# Patient Record
Sex: FEMALE | Race: WHITE | Employment: UNEMPLOYED | URBAN - METROPOLITAN AREA
[De-identification: names, ages, dates, MRNs, and addresses within clinical notes are randomized per-mention and may not be internally consistent; named-entity substitution may affect disease eponyms.]

---

## 2020-09-06 ENCOUNTER — HOSPITAL ENCOUNTER (EMERGENCY)
Age: 55
Discharge: ANOTHER HOSPITAL | End: 2020-09-06
Payer: COMMERCIAL

## 2020-09-06 VITALS
OXYGEN SATURATION: 91 % | RESPIRATION RATE: 20 BRPM | TEMPERATURE: 99.7 F | WEIGHT: 293 LBS | HEART RATE: 114 BPM | SYSTOLIC BLOOD PRESSURE: 202 MMHG | DIASTOLIC BLOOD PRESSURE: 93 MMHG

## 2020-09-06 PROCEDURE — 99212 OFFICE O/P EST SF 10 MIN: CPT

## 2020-09-06 NOTE — ED PROVIDER NOTES
Department of Emergency Medicine   ED  Provider Note  Admit Date/RoomTime: 2020  4:18 PM  ED Room:     Chief Complaint   Pharyngitis (fever, since Thursday) and Otalgia    History of Present Illness   Source of history provided by:  patient. History/Exam Limitations: none. Iglesia David is a 63 y.o. old female who has a past medical history of: History reviewed. No pertinent past medical history. presents to the emergency department with a complaint of sore throat and ear pain. Patient states this is day #5 of pain to the right side of her throat, traveling to her right ear. Pain with swallowing. Fever. Positive headache. Mild, nonproductive cough. Patient denies any nausea, vomiting, diarrhea. Patient denies any rashes. Patient took Motrin yesterday without relief. No medication today. ROS    Pertinent positives and negatives are stated within HPI, all other systems reviewed and are negative. Past Surgical History:  has a past surgical history that includes cyst removal and  section. Social History:  reports that she has never smoked. She does not have any smokeless tobacco history on file. She reports that she does not drink alcohol or use drugs. Family History: family history is not on file. Allergies: Patient has no known allergies. Physical Exam           ED Triage Vitals   BP Temp Temp src Pulse Resp SpO2 Height Weight   20 1624 20 1624 -- 20 1624 20 1624 20 1624 -- 20 1626   (!) 202/93 99.7 °F (37.6 °C)  114 20 91 %  (!) 510 lb (231.3 kg)     Oxygen Saturation Interpretation: Normal.  General:  NAD. Alert and Oriented. Well-appearing. Morbidly obese. Skin:  Warm, dry. No rashes. Head:  Normocephalic. Atraumatic. Eyes:  EOMI. Conjunctiva normal.  ENT:  Oral mucosa moist.  Airway patent. Posterior pharynx with deep erythema and fullness noted to the right tonsillar pillar.   Slight deflection of the uvula to the left.  Patient is handling her secretions fine. Bilateral TMs without erythema, without bulging. Neck:  Supple. Normal ROM. Respiratory:  No respiratory distress. No labored breathing. Lungs clear without rales, rhonchi or wheezing. Cardiovascular:  Regular rate. No Murmur. No peripheral edema. Extremities warm and good color. Extremities:  Normal ROM. Nontender to palpation. Atraumatic. Back:  Normal ROM. Nontender to palpation. Neuro:  Alert and Oriented to person, place, time and situation. Normal LOC. Moves all extremities. Speech fluent. Psych:  Calm and Cooperative. Normal thought process. Normal judgement. Lab / Imaging Results   (All laboratory and radiology results have been personally reviewed by myself)  Labs:  No results found for this visit on 09/06/20. Imaging: All Radiology results interpreted by Radiologist unless otherwise noted. No orders to display       ED Course / Medical Decision Making   Medications - No data to display     Consult(s):   None    Procedure(s):   none    MDM:   Patient is to go to the emergency room for CT soft tissue neck to rule out peritonsillar abscess, lab work and IV antibiotics and steroids. Patient states she will be going to [facility] emergency department where her physician goes. Counseling: The emergency provider has spoken with the patient and discussed todays results, in addition to providing specific details for the plan of care and counseling regarding the diagnosis and prognosis. Questions are answered at this time and they are agreeable with the plan. Assessment     1. Acute pharyngitis, unspecified etiology New Problem   2. Throat swelling New Problem   3.  Hypertension, unspecified type      Plan   Discharge to home  Patient condition is good    New Medications     New Prescriptions    No medications on file     Electronically signed by CARRIE Varma   DD: 9/6/20  **This report was transcribed using voice recognition software. Every effort was made to ensure accuracy; however, inadvertent computerized transcription errors may be present.   END OF ED PROVIDER NOTE        Jan Packer, [address]  09/06/20 [address], [address]  09/06/20 2026

## 2024-10-04 ENCOUNTER — HOSPITAL ENCOUNTER (INPATIENT)
Age: 59
LOS: 12 days | Discharge: NURSING, REHAB OR LONG-TERM CARE | DRG: 698 | End: 2024-10-16
Attending: INTERNAL MEDICINE | Admitting: HOSPITALIST
Payer: COMMERCIAL

## 2024-10-04 DIAGNOSIS — G89.29 OTHER CHRONIC PAIN: ICD-10-CM

## 2024-10-04 DIAGNOSIS — M48.02 CERVICAL STENOSIS OF SPINE: ICD-10-CM

## 2024-10-04 DIAGNOSIS — R41.0 ACUTE DELIRIUM: Primary | ICD-10-CM

## 2024-10-04 PROBLEM — R56.9 SEIZURE-LIKE ACTIVITY (HCC): Status: ACTIVE | Noted: 2024-10-04

## 2024-10-04 LAB
ALBUMIN SERPL-MCNC: 2 G/DL (ref 3.5–5.2)
ALP SERPL-CCNC: 138 U/L (ref 35–104)
ALT SERPL-CCNC: 13 U/L (ref 0–32)
AMMONIA PLAS-SCNC: 37 UMOL/L (ref 11–51)
ANION GAP SERPL CALCULATED.3IONS-SCNC: 8 MMOL/L (ref 7–16)
AST SERPL-CCNC: 30 U/L (ref 0–31)
BASOPHILS # BLD: 0.04 K/UL (ref 0–0.2)
BASOPHILS NFR BLD: 1 % (ref 0–2)
BILIRUB SERPL-MCNC: 0.9 MG/DL (ref 0–1.2)
BUN SERPL-MCNC: 28 MG/DL (ref 6–23)
CALCIUM SERPL-MCNC: 8.8 MG/DL (ref 8.6–10.2)
CHLORIDE SERPL-SCNC: 110 MMOL/L (ref 98–107)
CO2 SERPL-SCNC: 20 MMOL/L (ref 22–29)
CREAT SERPL-MCNC: 0.8 MG/DL (ref 0.5–1)
EOSINOPHIL # BLD: 0.15 K/UL (ref 0.05–0.5)
EOSINOPHILS RELATIVE PERCENT: 2 % (ref 0–6)
ERYTHROCYTE [DISTWIDTH] IN BLOOD BY AUTOMATED COUNT: 16.8 % (ref 11.5–15)
ERYTHROCYTE [SEDIMENTATION RATE] IN BLOOD BY WESTERGREN METHOD: 40 MM/HR (ref 0–20)
GFR, ESTIMATED: 83 ML/MIN/1.73M2
GLUCOSE BLD-MCNC: 193 MG/DL (ref 74–99)
GLUCOSE SERPL-MCNC: 199 MG/DL (ref 74–99)
HCT VFR BLD AUTO: 29.7 % (ref 34–48)
HGB BLD-MCNC: 9.4 G/DL (ref 11.5–15.5)
IMM GRANULOCYTES # BLD AUTO: 0.05 K/UL (ref 0–0.58)
IMM GRANULOCYTES NFR BLD: 1 % (ref 0–5)
LYMPHOCYTES NFR BLD: 0.66 K/UL (ref 1.5–4)
LYMPHOCYTES RELATIVE PERCENT: 8 % (ref 20–42)
MCH RBC QN AUTO: 35.6 PG (ref 26–35)
MCHC RBC AUTO-ENTMCNC: 31.6 G/DL (ref 32–34.5)
MCV RBC AUTO: 112.5 FL (ref 80–99.9)
MONOCYTES NFR BLD: 0.67 K/UL (ref 0.1–0.95)
MONOCYTES NFR BLD: 8 % (ref 2–12)
NEUTROPHILS NFR BLD: 80 % (ref 43–80)
NEUTS SEG NFR BLD: 6.4 K/UL (ref 1.8–7.3)
PLATELET # BLD AUTO: 83 K/UL (ref 130–450)
PLATELET CONFIRMATION: NORMAL
PMV BLD AUTO: 10.5 FL (ref 7–12)
POTASSIUM SERPL-SCNC: 5 MMOL/L (ref 3.5–5)
PROCALCITONIN SERPL-MCNC: 0.21 NG/ML (ref 0–0.08)
PROT SERPL-MCNC: 5.7 G/DL (ref 6.4–8.3)
RBC # BLD AUTO: 2.64 M/UL (ref 3.5–5.5)
RBC # BLD: ABNORMAL 10*6/UL
SODIUM SERPL-SCNC: 138 MMOL/L (ref 132–146)
TSH SERPL DL<=0.05 MIU/L-ACNC: 5.75 UIU/ML (ref 0.27–4.2)
WBC OTHER # BLD: 8 K/UL (ref 4.5–11.5)

## 2024-10-04 PROCEDURE — 99223 1ST HOSP IP/OBS HIGH 75: CPT

## 2024-10-04 PROCEDURE — 87040 BLOOD CULTURE FOR BACTERIA: CPT

## 2024-10-04 PROCEDURE — 85652 RBC SED RATE AUTOMATED: CPT

## 2024-10-04 PROCEDURE — 82962 GLUCOSE BLOOD TEST: CPT

## 2024-10-04 PROCEDURE — 82140 ASSAY OF AMMONIA: CPT

## 2024-10-04 PROCEDURE — 85025 COMPLETE CBC W/AUTO DIFF WBC: CPT

## 2024-10-04 PROCEDURE — 2580000003 HC RX 258

## 2024-10-04 PROCEDURE — 6370000000 HC RX 637 (ALT 250 FOR IP)

## 2024-10-04 PROCEDURE — 6360000002 HC RX W HCPCS

## 2024-10-04 PROCEDURE — 84145 PROCALCITONIN (PCT): CPT

## 2024-10-04 PROCEDURE — 2140000000 HC CCU INTERMEDIATE R&B

## 2024-10-04 PROCEDURE — 36415 COLL VENOUS BLD VENIPUNCTURE: CPT

## 2024-10-04 PROCEDURE — 80053 COMPREHEN METABOLIC PANEL: CPT

## 2024-10-04 PROCEDURE — 51702 INSERT TEMP BLADDER CATH: CPT

## 2024-10-04 PROCEDURE — 84443 ASSAY THYROID STIM HORMONE: CPT

## 2024-10-04 RX ORDER — POLYETHYLENE GLYCOL 3350 17 G/17G
17 POWDER, FOR SOLUTION ORAL DAILY PRN
Status: DISCONTINUED | OUTPATIENT
Start: 2024-10-04 | End: 2024-10-17 | Stop reason: HOSPADM

## 2024-10-04 RX ORDER — ONDANSETRON 2 MG/ML
4 INJECTION INTRAMUSCULAR; INTRAVENOUS EVERY 6 HOURS PRN
Status: DISCONTINUED | OUTPATIENT
Start: 2024-10-04 | End: 2024-10-17 | Stop reason: HOSPADM

## 2024-10-04 RX ORDER — LANOLIN ALCOHOL/MO/W.PET/CERES
3 CREAM (GRAM) TOPICAL NIGHTLY
Status: DISCONTINUED | OUTPATIENT
Start: 2024-10-04 | End: 2024-10-13

## 2024-10-04 RX ORDER — POTASSIUM CHLORIDE 7.45 MG/ML
10 INJECTION INTRAVENOUS PRN
Status: DISCONTINUED | OUTPATIENT
Start: 2024-10-04 | End: 2024-10-17 | Stop reason: HOSPADM

## 2024-10-04 RX ORDER — SODIUM CHLORIDE 0.9 % (FLUSH) 0.9 %
5-40 SYRINGE (ML) INJECTION PRN
Status: DISCONTINUED | OUTPATIENT
Start: 2024-10-04 | End: 2024-10-10 | Stop reason: SDUPTHER

## 2024-10-04 RX ORDER — MAGNESIUM HYDROXIDE/ALUMINUM HYDROXICE/SIMETHICONE 120; 1200; 1200 MG/30ML; MG/30ML; MG/30ML
30 SUSPENSION ORAL EVERY 6 HOURS PRN
Status: DISCONTINUED | OUTPATIENT
Start: 2024-10-04 | End: 2024-10-17 | Stop reason: HOSPADM

## 2024-10-04 RX ORDER — INSULIN LISPRO 100 [IU]/ML
0-8 INJECTION, SOLUTION INTRAVENOUS; SUBCUTANEOUS
Status: DISCONTINUED | OUTPATIENT
Start: 2024-10-05 | End: 2024-10-17 | Stop reason: HOSPADM

## 2024-10-04 RX ORDER — POTASSIUM CHLORIDE 1500 MG/1
40 TABLET, EXTENDED RELEASE ORAL PRN
Status: DISCONTINUED | OUTPATIENT
Start: 2024-10-04 | End: 2024-10-17 | Stop reason: HOSPADM

## 2024-10-04 RX ORDER — ONDANSETRON 4 MG/1
4 TABLET, ORALLY DISINTEGRATING ORAL EVERY 8 HOURS PRN
Status: DISCONTINUED | OUTPATIENT
Start: 2024-10-04 | End: 2024-10-17 | Stop reason: HOSPADM

## 2024-10-04 RX ORDER — SODIUM CHLORIDE 9 MG/ML
INJECTION, SOLUTION INTRAVENOUS PRN
Status: DISCONTINUED | OUTPATIENT
Start: 2024-10-04 | End: 2024-10-10 | Stop reason: SDUPTHER

## 2024-10-04 RX ORDER — ACETAMINOPHEN 325 MG/1
650 TABLET ORAL EVERY 6 HOURS PRN
Status: DISCONTINUED | OUTPATIENT
Start: 2024-10-04 | End: 2024-10-17 | Stop reason: HOSPADM

## 2024-10-04 RX ORDER — LEVETIRACETAM 500 MG/5ML
500 INJECTION, SOLUTION, CONCENTRATE INTRAVENOUS EVERY 12 HOURS
Status: DISCONTINUED | OUTPATIENT
Start: 2024-10-04 | End: 2024-10-17 | Stop reason: HOSPADM

## 2024-10-04 RX ORDER — LORAZEPAM 2 MG/ML
4 INJECTION INTRAMUSCULAR EVERY 5 MIN PRN
Status: DISCONTINUED | OUTPATIENT
Start: 2024-10-04 | End: 2024-10-17 | Stop reason: HOSPADM

## 2024-10-04 RX ORDER — MAGNESIUM SULFATE IN WATER 40 MG/ML
2000 INJECTION, SOLUTION INTRAVENOUS PRN
Status: DISCONTINUED | OUTPATIENT
Start: 2024-10-04 | End: 2024-10-17 | Stop reason: HOSPADM

## 2024-10-04 RX ORDER — HEPARIN SODIUM 5000 [USP'U]/ML
5000 INJECTION, SOLUTION INTRAVENOUS; SUBCUTANEOUS EVERY 8 HOURS SCHEDULED
Status: DISCONTINUED | OUTPATIENT
Start: 2024-10-04 | End: 2024-10-17 | Stop reason: HOSPADM

## 2024-10-04 RX ORDER — GLUCAGON 1 MG/ML
1 KIT INJECTION PRN
Status: DISCONTINUED | OUTPATIENT
Start: 2024-10-04 | End: 2024-10-17 | Stop reason: HOSPADM

## 2024-10-04 RX ORDER — SODIUM CHLORIDE 0.9 % (FLUSH) 0.9 %
5-40 SYRINGE (ML) INJECTION EVERY 12 HOURS SCHEDULED
Status: DISCONTINUED | OUTPATIENT
Start: 2024-10-04 | End: 2024-10-10 | Stop reason: SDUPTHER

## 2024-10-04 RX ORDER — ENOXAPARIN SODIUM 100 MG/ML
40 INJECTION SUBCUTANEOUS DAILY
Status: DISCONTINUED | OUTPATIENT
Start: 2024-10-04 | End: 2024-10-04

## 2024-10-04 RX ORDER — ACETAMINOPHEN 650 MG/1
650 SUPPOSITORY RECTAL EVERY 6 HOURS PRN
Status: DISCONTINUED | OUTPATIENT
Start: 2024-10-04 | End: 2024-10-17 | Stop reason: HOSPADM

## 2024-10-04 RX ORDER — INSULIN LISPRO 100 [IU]/ML
0-4 INJECTION, SOLUTION INTRAVENOUS; SUBCUTANEOUS NIGHTLY
Status: DISCONTINUED | OUTPATIENT
Start: 2024-10-04 | End: 2024-10-17 | Stop reason: HOSPADM

## 2024-10-04 RX ORDER — DEXTROSE MONOHYDRATE 100 MG/ML
INJECTION, SOLUTION INTRAVENOUS CONTINUOUS PRN
Status: DISCONTINUED | OUTPATIENT
Start: 2024-10-04 | End: 2024-10-17 | Stop reason: HOSPADM

## 2024-10-04 RX ADMIN — LEVETIRACETAM 500 MG: 100 INJECTION INTRAVENOUS at 22:24

## 2024-10-04 RX ADMIN — HEPARIN SODIUM 5000 UNITS: 5000 INJECTION INTRAVENOUS; SUBCUTANEOUS at 22:25

## 2024-10-04 RX ADMIN — ACETAMINOPHEN 650 MG: 325 TABLET ORAL at 22:25

## 2024-10-04 RX ADMIN — SODIUM CHLORIDE, PRESERVATIVE FREE 10 ML: 5 INJECTION INTRAVENOUS at 22:24

## 2024-10-04 RX ADMIN — Medication 3 MG: at 22:26

## 2024-10-04 ASSESSMENT — PAIN DESCRIPTION - FREQUENCY: FREQUENCY: CONTINUOUS

## 2024-10-04 ASSESSMENT — PAIN DESCRIPTION - DESCRIPTORS: DESCRIPTORS: ACHING;DISCOMFORT;SORE;TENDER

## 2024-10-04 ASSESSMENT — PAIN SCALES - WONG BAKER
WONGBAKER_NUMERICALRESPONSE: HURTS LITTLE MORE
WONGBAKER_NUMERICALRESPONSE: HURTS A LITTLE BIT

## 2024-10-04 ASSESSMENT — PAIN SCALES - GENERAL
PAINLEVEL_OUTOF10: 7
PAINLEVEL_OUTOF10: 4

## 2024-10-04 ASSESSMENT — PAIN DESCRIPTION - LOCATION: LOCATION: GENERALIZED

## 2024-10-04 ASSESSMENT — PAIN DESCRIPTION - ONSET: ONSET: ON-GOING

## 2024-10-04 ASSESSMENT — PAIN DESCRIPTION - PAIN TYPE: TYPE: CHRONIC PAIN

## 2024-10-04 ASSESSMENT — PAIN - FUNCTIONAL ASSESSMENT: PAIN_FUNCTIONAL_ASSESSMENT: ACTIVITIES ARE NOT PREVENTED

## 2024-10-05 PROBLEM — Z51.5 PALLIATIVE CARE BY SPECIALIST: Status: ACTIVE | Noted: 2024-10-05

## 2024-10-05 PROBLEM — Z71.89 GOALS OF CARE, COUNSELING/DISCUSSION: Status: ACTIVE | Noted: 2024-10-05

## 2024-10-05 LAB
ALBUMIN SERPL-MCNC: 1.6 G/DL (ref 3.5–5.2)
ALP SERPL-CCNC: 117 U/L (ref 35–104)
ALT SERPL-CCNC: 10 U/L (ref 0–32)
ANION GAP SERPL CALCULATED.3IONS-SCNC: 5 MMOL/L (ref 7–16)
ANION GAP SERPL CALCULATED.3IONS-SCNC: 6 MMOL/L (ref 7–16)
AST SERPL-CCNC: 38 U/L (ref 0–31)
B-OH-BUTYR SERPL-MCNC: 0.28 MMOL/L (ref 0.02–0.27)
B.E.: -2.2 MMOL/L (ref -3–3)
BACTERIA URNS QL MICRO: ABNORMAL
BASOPHILS # BLD: 0 K/UL (ref 0–0.2)
BASOPHILS NFR BLD: 0 % (ref 0–2)
BILIRUB SERPL-MCNC: 0.9 MG/DL (ref 0–1.2)
BILIRUB UR QL STRIP: NEGATIVE
BUN SERPL-MCNC: 29 MG/DL (ref 6–23)
BUN SERPL-MCNC: 29 MG/DL (ref 6–23)
CALCIUM SERPL-MCNC: 8.8 MG/DL (ref 8.6–10.2)
CALCIUM SERPL-MCNC: 8.9 MG/DL (ref 8.6–10.2)
CASTS #/AREA URNS LPF: ABNORMAL /LPF
CHLORIDE SERPL-SCNC: 112 MMOL/L (ref 98–107)
CHLORIDE SERPL-SCNC: 113 MMOL/L (ref 98–107)
CLARITY UR: ABNORMAL
CO2 SERPL-SCNC: 16 MMOL/L (ref 22–29)
CO2 SERPL-SCNC: 22 MMOL/L (ref 22–29)
COHB: 1 % (ref 0–1.5)
COLOR UR: YELLOW
CREAT SERPL-MCNC: 0.8 MG/DL (ref 0.5–1)
CREAT SERPL-MCNC: 0.8 MG/DL (ref 0.5–1)
CREAT UR-MCNC: 100.9 MG/DL (ref 29–226)
CRITICAL: ABNORMAL
CRP SERPL HS-MCNC: 50 MG/L (ref 0–5)
CRYSTALS URNS MICRO: ABNORMAL /HPF
DATE ANALYZED: ABNORMAL
DATE OF COLLECTION: ABNORMAL
EOSINOPHIL # BLD: 0.18 K/UL (ref 0.05–0.5)
EOSINOPHILS RELATIVE PERCENT: 3 % (ref 0–6)
ERYTHROCYTE [DISTWIDTH] IN BLOOD BY AUTOMATED COUNT: 17.1 % (ref 11.5–15)
FERRITIN SERPL-MCNC: 912 NG/ML
FOLATE SERPL-MCNC: 17 NG/ML (ref 4.8–24.2)
GFR, ESTIMATED: 83 ML/MIN/1.73M2
GFR, ESTIMATED: 86 ML/MIN/1.73M2
GLUCOSE BLD-MCNC: 158 MG/DL (ref 74–99)
GLUCOSE BLD-MCNC: 159 MG/DL (ref 74–99)
GLUCOSE BLD-MCNC: 170 MG/DL (ref 74–99)
GLUCOSE BLD-MCNC: 171 MG/DL (ref 74–99)
GLUCOSE SERPL-MCNC: 160 MG/DL (ref 74–99)
GLUCOSE SERPL-MCNC: 168 MG/DL (ref 74–99)
GLUCOSE UR STRIP-MCNC: NEGATIVE MG/DL
HCO3: 21.5 MMOL/L (ref 22–26)
HCT VFR BLD AUTO: 27.8 % (ref 34–48)
HGB BLD-MCNC: 8.4 G/DL (ref 11.5–15.5)
HGB UR QL STRIP.AUTO: NEGATIVE
HHB: 0.8 % (ref 0–5)
INR PPP: 1.5
IRON SATN MFR SERPL: NORMAL % (ref 15–50)
IRON SERPL-MCNC: 106 UG/DL (ref 37–145)
KETONES UR STRIP-MCNC: ABNORMAL MG/DL
LAB: ABNORMAL
LACTATE BLDV-SCNC: 1.7 MMOL/L (ref 0.5–2.2)
LEUKOCYTE ESTERASE UR QL STRIP: ABNORMAL
LYMPHOCYTES NFR BLD: 0.95 K/UL (ref 1.5–4)
LYMPHOCYTES RELATIVE PERCENT: 14 % (ref 20–42)
Lab: 1245
MCH RBC QN AUTO: 35.6 PG (ref 26–35)
MCHC RBC AUTO-ENTMCNC: 30.2 G/DL (ref 32–34.5)
MCV RBC AUTO: 117.8 FL (ref 80–99.9)
METHB: 0.4 % (ref 0–1.5)
MODE: ABNORMAL
MONOCYTES NFR BLD: 0 % (ref 2–12)
MONOCYTES NFR BLD: 0 K/UL (ref 0.1–0.95)
NEUTROPHILS NFR BLD: 83 % (ref 43–80)
NEUTS SEG NFR BLD: 5.67 K/UL (ref 1.8–7.3)
NITRITE UR QL STRIP: POSITIVE
O2 SATURATION: 99.2 % (ref 92–98.5)
O2HB: 97.8 % (ref 94–97)
OPERATOR ID: 7490
OSMOLALITY UR: 625 MOSM/KG (ref 300–900)
PATIENT TEMP: 37 C
PCO2: 32.4 MMHG (ref 35–45)
PH BLOOD GAS: 7.44 (ref 7.35–7.45)
PH UR STRIP: 6 [PH] (ref 5–9)
PLATELET # BLD AUTO: 59 K/UL (ref 130–450)
PLATELET CONFIRMATION: NORMAL
PMV BLD AUTO: 10.8 FL (ref 7–12)
PO2: 174.1 MMHG (ref 75–100)
POTASSIUM SERPL-SCNC: 5 MMOL/L (ref 3.5–5)
POTASSIUM SERPL-SCNC: 5.4 MMOL/L (ref 3.5–5)
PROT SERPL-MCNC: 5.5 G/DL (ref 6.4–8.3)
PROT UR STRIP-MCNC: ABNORMAL MG/DL
PROTHROMBIN TIME: 15.9 SEC (ref 9.3–12.4)
RBC # BLD AUTO: 2.36 M/UL (ref 3.5–5.5)
RBC # BLD: ABNORMAL 10*6/UL
RBC #/AREA URNS HPF: ABNORMAL /HPF
SODIUM SERPL-SCNC: 134 MMOL/L (ref 132–146)
SODIUM SERPL-SCNC: 140 MMOL/L (ref 132–146)
SODIUM UR-SCNC: 58 MMOL/L
SOURCE, BLOOD GAS: ABNORMAL
SP GR UR STRIP: 1.02 (ref 1–1.03)
THB: 9.1 G/DL (ref 11.5–16.5)
TIBC SERPL-MCNC: NORMAL UG/DL (ref 250–450)
TIME ANALYZED: 1250
UROBILINOGEN UR STRIP-ACNC: 0.2 EU/DL (ref 0–1)
UUN UR-MCNC: 823 MG/DL (ref 800–1666)
VIT B12 SERPL-MCNC: >2000 PG/ML (ref 211–946)
WBC #/AREA URNS HPF: ABNORMAL /HPF
WBC OTHER # BLD: 6.8 K/UL (ref 4.5–11.5)

## 2024-10-05 PROCEDURE — 80048 BASIC METABOLIC PNL TOTAL CA: CPT

## 2024-10-05 PROCEDURE — 82010 KETONE BODYS QUAN: CPT

## 2024-10-05 PROCEDURE — P9047 ALBUMIN (HUMAN), 25%, 50ML: HCPCS | Performed by: INTERNAL MEDICINE

## 2024-10-05 PROCEDURE — 87077 CULTURE AEROBIC IDENTIFY: CPT

## 2024-10-05 PROCEDURE — 87205 SMEAR GRAM STAIN: CPT

## 2024-10-05 PROCEDURE — 83550 IRON BINDING TEST: CPT

## 2024-10-05 PROCEDURE — 85025 COMPLETE CBC W/AUTO DIFF WBC: CPT

## 2024-10-05 PROCEDURE — 84540 ASSAY OF URINE/UREA-N: CPT

## 2024-10-05 PROCEDURE — 36415 COLL VENOUS BLD VENIPUNCTURE: CPT

## 2024-10-05 PROCEDURE — 82962 GLUCOSE BLOOD TEST: CPT

## 2024-10-05 PROCEDURE — 87070 CULTURE OTHR SPECIMN AEROBIC: CPT

## 2024-10-05 PROCEDURE — 6370000000 HC RX 637 (ALT 250 FOR IP)

## 2024-10-05 PROCEDURE — 2140000000 HC CCU INTERMEDIATE R&B

## 2024-10-05 PROCEDURE — 87086 URINE CULTURE/COLONY COUNT: CPT

## 2024-10-05 PROCEDURE — 82805 BLOOD GASES W/O2 SATURATION: CPT

## 2024-10-05 PROCEDURE — 82746 ASSAY OF FOLIC ACID SERUM: CPT

## 2024-10-05 PROCEDURE — 85610 PROTHROMBIN TIME: CPT

## 2024-10-05 PROCEDURE — 83935 ASSAY OF URINE OSMOLALITY: CPT

## 2024-10-05 PROCEDURE — 99232 SBSQ HOSP IP/OBS MODERATE 35: CPT | Performed by: INTERNAL MEDICINE

## 2024-10-05 PROCEDURE — 6360000002 HC RX W HCPCS: Performed by: INTERNAL MEDICINE

## 2024-10-05 PROCEDURE — 6370000000 HC RX 637 (ALT 250 FOR IP): Performed by: HOSPITALIST

## 2024-10-05 PROCEDURE — 84300 ASSAY OF URINE SODIUM: CPT

## 2024-10-05 PROCEDURE — 86140 C-REACTIVE PROTEIN: CPT

## 2024-10-05 PROCEDURE — 82728 ASSAY OF FERRITIN: CPT

## 2024-10-05 PROCEDURE — 2500000003 HC RX 250 WO HCPCS: Performed by: INTERNAL MEDICINE

## 2024-10-05 PROCEDURE — 2500000003 HC RX 250 WO HCPCS: Performed by: HOSPITALIST

## 2024-10-05 PROCEDURE — 36600 WITHDRAWAL OF ARTERIAL BLOOD: CPT

## 2024-10-05 PROCEDURE — 2580000003 HC RX 258

## 2024-10-05 PROCEDURE — 2580000003 HC RX 258: Performed by: INTERNAL MEDICINE

## 2024-10-05 PROCEDURE — 82570 ASSAY OF URINE CREATININE: CPT

## 2024-10-05 PROCEDURE — 82607 VITAMIN B-12: CPT

## 2024-10-05 PROCEDURE — 6360000002 HC RX W HCPCS

## 2024-10-05 PROCEDURE — 87040 BLOOD CULTURE FOR BACTERIA: CPT

## 2024-10-05 PROCEDURE — 51702 INSERT TEMP BLADDER CATH: CPT

## 2024-10-05 PROCEDURE — 81001 URINALYSIS AUTO W/SCOPE: CPT

## 2024-10-05 PROCEDURE — 76937 US GUIDE VASCULAR ACCESS: CPT

## 2024-10-05 PROCEDURE — 80053 COMPREHEN METABOLIC PANEL: CPT

## 2024-10-05 PROCEDURE — 99222 1ST HOSP IP/OBS MODERATE 55: CPT | Performed by: NURSE PRACTITIONER

## 2024-10-05 PROCEDURE — 83605 ASSAY OF LACTIC ACID: CPT

## 2024-10-05 PROCEDURE — 83540 ASSAY OF IRON: CPT

## 2024-10-05 RX ORDER — ALBUMIN (HUMAN) 12.5 G/50ML
25 SOLUTION INTRAVENOUS EVERY 6 HOURS
Status: COMPLETED | OUTPATIENT
Start: 2024-10-05 | End: 2024-10-06

## 2024-10-05 RX ORDER — SODIUM CHLORIDE, SODIUM LACTATE, POTASSIUM CHLORIDE, CALCIUM CHLORIDE 600; 310; 30; 20 MG/100ML; MG/100ML; MG/100ML; MG/100ML
INJECTION, SOLUTION INTRAVENOUS CONTINUOUS
Status: DISCONTINUED | OUTPATIENT
Start: 2024-10-05 | End: 2024-10-07

## 2024-10-05 RX ADMIN — PIPERACILLIN AND TAZOBACTAM 4500 MG: 4; .5 INJECTION, POWDER, FOR SOLUTION INTRAVENOUS at 23:50

## 2024-10-05 RX ADMIN — ALBUMIN (HUMAN) 25 G: 0.25 INJECTION, SOLUTION INTRAVENOUS at 22:40

## 2024-10-05 RX ADMIN — SODIUM CHLORIDE 1250 MG: 9 INJECTION, SOLUTION INTRAVENOUS at 22:46

## 2024-10-05 RX ADMIN — ANTI-FUNGAL POWDER MICONAZOLE NITRATE TALC FREE: 1.42 POWDER TOPICAL at 21:11

## 2024-10-05 RX ADMIN — VANCOMYCIN HYDROCHLORIDE 2000 MG: 10 INJECTION, POWDER, LYOPHILIZED, FOR SOLUTION INTRAVENOUS at 12:22

## 2024-10-05 RX ADMIN — SODIUM CHLORIDE, POTASSIUM CHLORIDE, SODIUM LACTATE AND CALCIUM CHLORIDE: 600; 310; 30; 20 INJECTION, SOLUTION INTRAVENOUS at 15:16

## 2024-10-05 RX ADMIN — ALBUMIN (HUMAN) 25 G: 0.25 INJECTION, SOLUTION INTRAVENOUS at 11:12

## 2024-10-05 RX ADMIN — ALBUMIN (HUMAN) 25 G: 0.25 INJECTION, SOLUTION INTRAVENOUS at 17:08

## 2024-10-05 RX ADMIN — LEVETIRACETAM 500 MG: 100 INJECTION INTRAVENOUS at 21:10

## 2024-10-05 RX ADMIN — HEPARIN SODIUM 5000 UNITS: 5000 INJECTION INTRAVENOUS; SUBCUTANEOUS at 16:16

## 2024-10-05 RX ADMIN — LEVETIRACETAM 500 MG: 100 INJECTION INTRAVENOUS at 07:57

## 2024-10-05 RX ADMIN — SODIUM CHLORIDE, PRESERVATIVE FREE 10 ML: 5 INJECTION INTRAVENOUS at 21:12

## 2024-10-05 RX ADMIN — ANTI-FUNGAL POWDER MICONAZOLE NITRATE TALC FREE: 1.42 POWDER TOPICAL at 07:58

## 2024-10-05 RX ADMIN — HEPARIN SODIUM 5000 UNITS: 5000 INJECTION INTRAVENOUS; SUBCUTANEOUS at 06:02

## 2024-10-05 RX ADMIN — ACETAMINOPHEN 650 MG: 325 TABLET ORAL at 21:10

## 2024-10-05 RX ADMIN — SODIUM BICARBONATE 50 MEQ: 84 INJECTION, SOLUTION INTRAVENOUS at 10:59

## 2024-10-05 RX ADMIN — SODIUM BICARBONATE: 84 INJECTION, SOLUTION INTRAVENOUS at 12:54

## 2024-10-05 RX ADMIN — MICONAZOLE NITRATE: 20 OINTMENT TOPICAL at 07:58

## 2024-10-05 RX ADMIN — SODIUM CHLORIDE, PRESERVATIVE FREE 10 ML: 5 INJECTION INTRAVENOUS at 07:58

## 2024-10-05 RX ADMIN — PIPERACILLIN AND TAZOBACTAM 4500 MG: 4; .5 INJECTION, POWDER, FOR SOLUTION INTRAVENOUS at 15:16

## 2024-10-05 RX ADMIN — HEPARIN SODIUM 5000 UNITS: 5000 INJECTION INTRAVENOUS; SUBCUTANEOUS at 22:41

## 2024-10-05 RX ADMIN — MICONAZOLE NITRATE: 20 OINTMENT TOPICAL at 21:11

## 2024-10-05 RX ADMIN — ACETAMINOPHEN 650 MG: 325 TABLET ORAL at 06:01

## 2024-10-05 RX ADMIN — Medication 3 MG: at 21:10

## 2024-10-05 ASSESSMENT — PAIN SCALES - WONG BAKER
WONGBAKER_NUMERICALRESPONSE: HURTS EVEN MORE
WONGBAKER_NUMERICALRESPONSE: HURTS LITTLE MORE
WONGBAKER_NUMERICALRESPONSE: HURTS A LITTLE BIT
WONGBAKER_NUMERICALRESPONSE: HURTS LITTLE MORE
WONGBAKER_NUMERICALRESPONSE: NO HURT

## 2024-10-05 ASSESSMENT — PAIN DESCRIPTION - LOCATION
LOCATION: GENERALIZED
LOCATION: GENERALIZED
LOCATION: KNEE

## 2024-10-05 ASSESSMENT — PAIN DESCRIPTION - ONSET
ONSET: ON-GOING
ONSET: ON-GOING

## 2024-10-05 ASSESSMENT — PAIN SCALES - GENERAL
PAINLEVEL_OUTOF10: 4
PAINLEVEL_OUTOF10: 3
PAINLEVEL_OUTOF10: 7
PAINLEVEL_OUTOF10: 4
PAINLEVEL_OUTOF10: 4

## 2024-10-05 ASSESSMENT — PAIN - FUNCTIONAL ASSESSMENT
PAIN_FUNCTIONAL_ASSESSMENT: ACTIVITIES ARE NOT PREVENTED
PAIN_FUNCTIONAL_ASSESSMENT: ACTIVITIES ARE NOT PREVENTED

## 2024-10-05 ASSESSMENT — PAIN DESCRIPTION - FREQUENCY
FREQUENCY: CONTINUOUS
FREQUENCY: CONTINUOUS

## 2024-10-05 ASSESSMENT — PAIN DESCRIPTION - DESCRIPTORS
DESCRIPTORS: ACHING;DISCOMFORT;SORE;TENDER
DESCRIPTORS: ACHING;DISCOMFORT;SORE

## 2024-10-05 ASSESSMENT — PAIN DESCRIPTION - ORIENTATION: ORIENTATION: RIGHT;LEFT

## 2024-10-05 ASSESSMENT — PAIN DESCRIPTION - PAIN TYPE: TYPE: CHRONIC PAIN

## 2024-10-05 NOTE — H&P
altered mental status not forthcoming with information    Physical Exam:  /75   Pulse (!) 103   Temp 97.9 °F (36.6 °C) (Axillary)   Resp 20   SpO2 97%   General appearance: Ill-appearing obese female no apparent acute distress, appears stated age and cooperative.  HEENT: Conjunctivae/corneas clear. Mucous membranes moist.  Neck: Supple. No JVD.  Respiratory:  Clear to auscultation bilaterally.  Normal respiratory effort.   Cardiovascular:  RRR. S1, S2 without MRG.  PV: Pulses palpable. No edema.   Abdomen: Soft, non-tender, non-distended. +BS  Musculoskeletal: No obvious deformities.   Skin: Normal skin color.  No rashes or lesions. Good turgor.   Neurologic:  Grossly non-focal. Awake, alert to self , following simple commands.       Reviewed EKG and CXR personally      CBC:   No results for input(s): \"WBC\", \"RBC\", \"HGB\", \"HCT\", \"MCV\", \"RDW\", \"PLT\" in the last 72 hours.  BMP: No results for input(s): \"NA\", \"K\", \"CL\", \"CO2\", \"BUN\", \"CREATININE\", \"MG\", \"PHOS\" in the last 72 hours.    Invalid input(s): \"CA\"  LFT:  No results for input(s): \"ALKPHOS\", \"ALT\", \"AST\", \"BILITOT\", \"AMYLASE\", \"LIPASE\" in the last 72 hours.    Invalid input(s): \"PROT\", \"ALB\"  CE:  No results for input(s): \"CKTOTAL\", \"TROPONINI\" in the last 72 hours.  PT/INR: No results for input(s): \"INR\", \"APTT\" in the last 72 hours.  BNP: No results for input(s): \"BNP\" in the last 72 hours.  ESR:   Lab Results   Component Value Date    SEDRATE 87 (H) 10/24/2022     CRP:   Lab Results   Component Value Date    CRP 1.5 (H) 10/24/2022     D Dimer: No results found for: \"DDIMER\"   Folate and B12: No results found for: \"MIOFTTUW29\", No results found for: \"FOLATE\"  Lactic Acid:   Lab Results   Component Value Date    LACTA 2.2 12/20/2023     Thyroid Studies: No results found for: \"TSH\", \"P6NANOB\", \"THYROIDAB\"    Oupatient labs:  No results found for: \"CHOL\", \"TRIG\", \"HDL\", \"TSH\", \"PSA\", \"INR\", \"LABA1C\"    Urinalysis:  No results found for: \"NITRU\",

## 2024-10-05 NOTE — PATIENT CARE CONFERENCE
[facility] Quality Flow/Interdisciplinary Rounds Progress Note        Quality Flow Rounds held on October 5, 2024    Disciplines Attending:  Bedside Nurse and Nursing Unit Leadership    Lissa Diggs was admitted on 10/4/2024  6:16 PM    Anticipated Discharge Date:       Disposition:    Rylan Score:  Rylan Scale Score: 11    Readmission Risk              Risk of Unplanned Readmission:  8           Discussed patient goal for the day, patient clinical progression, and barriers to discharge.  The following Goal(s) of the Day/Commitment(s) have been identified:  Labs - Report Results check if urology  is needed for consult      Kelli Stroud RN  October 5, 2024

## 2024-10-06 LAB
ALBUMIN SERPL-MCNC: 2.5 G/DL (ref 3.5–5.2)
ALP SERPL-CCNC: 84 U/L (ref 35–104)
ALT SERPL-CCNC: 10 U/L (ref 0–32)
ANION GAP SERPL CALCULATED.3IONS-SCNC: 9 MMOL/L (ref 7–16)
AST SERPL-CCNC: 20 U/L (ref 0–31)
BASOPHILS # BLD: 0.01 K/UL (ref 0–0.2)
BASOPHILS # BLD: 0.04 K/UL (ref 0–0.2)
BASOPHILS NFR BLD: 1 % (ref 0–2)
BASOPHILS NFR BLD: 2 % (ref 0–2)
BILIRUB SERPL-MCNC: 0.9 MG/DL (ref 0–1.2)
BUN SERPL-MCNC: 27 MG/DL (ref 6–23)
CALCIUM SERPL-MCNC: 8.8 MG/DL (ref 8.6–10.2)
CHLORIDE SERPL-SCNC: 110 MMOL/L (ref 98–107)
CO2 SERPL-SCNC: 21 MMOL/L (ref 22–29)
CREAT SERPL-MCNC: 0.8 MG/DL (ref 0.5–1)
EOSINOPHIL # BLD: 0.04 K/UL (ref 0.05–0.5)
EOSINOPHIL # BLD: 0.05 K/UL (ref 0.05–0.5)
EOSINOPHILS RELATIVE PERCENT: 2 % (ref 0–6)
EOSINOPHILS RELATIVE PERCENT: 2 % (ref 0–6)
ERYTHROCYTE [DISTWIDTH] IN BLOOD BY AUTOMATED COUNT: 16.4 % (ref 11.5–15)
ERYTHROCYTE [DISTWIDTH] IN BLOOD BY AUTOMATED COUNT: 19.3 % (ref 11.5–15)
GFR, ESTIMATED: 88 ML/MIN/1.73M2
GLUCOSE BLD-MCNC: 183 MG/DL (ref 74–99)
GLUCOSE BLD-MCNC: 185 MG/DL (ref 74–99)
GLUCOSE BLD-MCNC: 197 MG/DL (ref 74–99)
GLUCOSE BLD-MCNC: 201 MG/DL (ref 74–99)
GLUCOSE SERPL-MCNC: 164 MG/DL (ref 74–99)
HCT VFR BLD AUTO: 20.1 % (ref 34–48)
HCT VFR BLD AUTO: 21.3 % (ref 34–48)
HCT VFR BLD AUTO: 21.9 % (ref 34–48)
HGB BLD-MCNC: 6.4 G/DL (ref 11.5–15.5)
HGB BLD-MCNC: 6.7 G/DL (ref 11.5–15.5)
HGB BLD-MCNC: 6.9 G/DL (ref 11.5–15.5)
IMM GRANULOCYTES # BLD AUTO: <0.03 K/UL (ref 0–0.58)
IMM GRANULOCYTES NFR BLD: 1 % (ref 0–5)
LYMPHOCYTES NFR BLD: 0.21 K/UL (ref 1.5–4)
LYMPHOCYTES NFR BLD: 0.33 K/UL (ref 1.5–4)
LYMPHOCYTES RELATIVE PERCENT: 10 % (ref 20–42)
LYMPHOCYTES RELATIVE PERCENT: 13 % (ref 20–42)
MCH RBC QN AUTO: 34.6 PG (ref 26–35)
MCH RBC QN AUTO: 35.4 PG (ref 26–35)
MCHC RBC AUTO-ENTMCNC: 31.5 G/DL (ref 32–34.5)
MCHC RBC AUTO-ENTMCNC: 31.8 G/DL (ref 32–34.5)
MCV RBC AUTO: 108.6 FL (ref 80–99.9)
MCV RBC AUTO: 112.7 FL (ref 80–99.9)
MONOCYTES NFR BLD: 0.15 K/UL (ref 0.1–0.95)
MONOCYTES NFR BLD: 0.23 K/UL (ref 0.1–0.95)
MONOCYTES NFR BLD: 11 % (ref 2–12)
MONOCYTES NFR BLD: 6 % (ref 2–12)
NEUTROPHILS NFR BLD: 76 % (ref 43–80)
NEUTROPHILS NFR BLD: 77 % (ref 43–80)
NEUTS SEG NFR BLD: 1.6 K/UL (ref 1.8–7.3)
NEUTS SEG NFR BLD: 1.93 K/UL (ref 1.8–7.3)
PLATELET # BLD AUTO: 48 K/UL (ref 130–450)
PLATELET # BLD AUTO: 53 K/UL (ref 130–450)
PLATELET CONFIRMATION: NORMAL
PLATELET CONFIRMATION: NORMAL
PMV BLD AUTO: 10.9 FL (ref 7–12)
PMV BLD AUTO: 11.4 FL (ref 7–12)
POTASSIUM SERPL-SCNC: 4.7 MMOL/L (ref 3.5–5)
PROT SERPL-MCNC: 5.1 G/DL (ref 6.4–8.3)
RBC # BLD AUTO: 1.85 M/UL (ref 3.5–5.5)
RBC # BLD AUTO: 1.89 M/UL (ref 3.5–5.5)
RBC # BLD: ABNORMAL 10*6/UL
SODIUM SERPL-SCNC: 140 MMOL/L (ref 132–146)
T4 FREE SERPL-MCNC: 0.9 NG/DL (ref 0.9–1.7)
WBC OTHER # BLD: 2.1 K/UL (ref 4.5–11.5)
WBC OTHER # BLD: 2.5 K/UL (ref 4.5–11.5)

## 2024-10-06 PROCEDURE — 85025 COMPLETE CBC W/AUTO DIFF WBC: CPT

## 2024-10-06 PROCEDURE — 99232 SBSQ HOSP IP/OBS MODERATE 35: CPT | Performed by: PHYSICIAN ASSISTANT

## 2024-10-06 PROCEDURE — 6370000000 HC RX 637 (ALT 250 FOR IP): Performed by: INTERNAL MEDICINE

## 2024-10-06 PROCEDURE — 6360000002 HC RX W HCPCS: Performed by: INTERNAL MEDICINE

## 2024-10-06 PROCEDURE — 2580000003 HC RX 258

## 2024-10-06 PROCEDURE — 86850 RBC ANTIBODY SCREEN: CPT

## 2024-10-06 PROCEDURE — P9073 PLATELETS PHERESIS PATH REDU: HCPCS

## 2024-10-06 PROCEDURE — 82962 GLUCOSE BLOOD TEST: CPT

## 2024-10-06 PROCEDURE — 85014 HEMATOCRIT: CPT

## 2024-10-06 PROCEDURE — 99232 SBSQ HOSP IP/OBS MODERATE 35: CPT | Performed by: INTERNAL MEDICINE

## 2024-10-06 PROCEDURE — 86923 COMPATIBILITY TEST ELECTRIC: CPT

## 2024-10-06 PROCEDURE — 2140000000 HC CCU INTERMEDIATE R&B

## 2024-10-06 PROCEDURE — 36430 TRANSFUSION BLD/BLD COMPNT: CPT

## 2024-10-06 PROCEDURE — 86901 BLOOD TYPING SEROLOGIC RH(D): CPT

## 2024-10-06 PROCEDURE — 84439 ASSAY OF FREE THYROXINE: CPT

## 2024-10-06 PROCEDURE — 2580000003 HC RX 258: Performed by: INTERNAL MEDICINE

## 2024-10-06 PROCEDURE — 6370000000 HC RX 637 (ALT 250 FOR IP)

## 2024-10-06 PROCEDURE — 85018 HEMOGLOBIN: CPT

## 2024-10-06 PROCEDURE — 80053 COMPREHEN METABOLIC PANEL: CPT

## 2024-10-06 PROCEDURE — P9016 RBC LEUKOCYTES REDUCED: HCPCS

## 2024-10-06 PROCEDURE — 86900 BLOOD TYPING SEROLOGIC ABO: CPT

## 2024-10-06 PROCEDURE — 6360000002 HC RX W HCPCS

## 2024-10-06 PROCEDURE — 36415 COLL VENOUS BLD VENIPUNCTURE: CPT

## 2024-10-06 PROCEDURE — 82180 ASSAY OF ASCORBIC ACID: CPT

## 2024-10-06 PROCEDURE — 6370000000 HC RX 637 (ALT 250 FOR IP): Performed by: HOSPITALIST

## 2024-10-06 PROCEDURE — P9047 ALBUMIN (HUMAN), 25%, 50ML: HCPCS | Performed by: INTERNAL MEDICINE

## 2024-10-06 RX ORDER — SODIUM CHLORIDE 9 MG/ML
INJECTION, SOLUTION INTRAVENOUS PRN
Status: DISCONTINUED | OUTPATIENT
Start: 2024-10-06 | End: 2024-10-17 | Stop reason: HOSPADM

## 2024-10-06 RX ORDER — ASCORBIC ACID 500 MG
500 TABLET ORAL DAILY
Status: DISCONTINUED | OUTPATIENT
Start: 2024-10-06 | End: 2024-10-17 | Stop reason: HOSPADM

## 2024-10-06 RX ORDER — OXYCODONE HYDROCHLORIDE 5 MG/1
5 TABLET ORAL EVERY 4 HOURS PRN
Status: DISCONTINUED | OUTPATIENT
Start: 2024-10-06 | End: 2024-10-16

## 2024-10-06 RX ADMIN — ACETAMINOPHEN 650 MG: 325 TABLET ORAL at 12:45

## 2024-10-06 RX ADMIN — Medication 500 MG: at 14:37

## 2024-10-06 RX ADMIN — ANTI-FUNGAL POWDER MICONAZOLE NITRATE TALC FREE: 1.42 POWDER TOPICAL at 20:42

## 2024-10-06 RX ADMIN — LEVETIRACETAM 500 MG: 100 INJECTION INTRAVENOUS at 08:14

## 2024-10-06 RX ADMIN — MICONAZOLE NITRATE: 20 OINTMENT TOPICAL at 20:42

## 2024-10-06 RX ADMIN — ALBUMIN (HUMAN) 25 G: 0.25 INJECTION, SOLUTION INTRAVENOUS at 16:49

## 2024-10-06 RX ADMIN — ALBUMIN (HUMAN) 25 G: 0.25 INJECTION, SOLUTION INTRAVENOUS at 05:43

## 2024-10-06 RX ADMIN — Medication 3 MG: at 20:41

## 2024-10-06 RX ADMIN — ALBUMIN (HUMAN) 25 G: 0.25 INJECTION, SOLUTION INTRAVENOUS at 11:15

## 2024-10-06 RX ADMIN — SODIUM CHLORIDE 1250 MG: 9 INJECTION, SOLUTION INTRAVENOUS at 11:12

## 2024-10-06 RX ADMIN — PIPERACILLIN AND TAZOBACTAM 4500 MG: 4; .5 INJECTION, POWDER, FOR SOLUTION INTRAVENOUS at 15:05

## 2024-10-06 RX ADMIN — HEPARIN SODIUM 5000 UNITS: 5000 INJECTION INTRAVENOUS; SUBCUTANEOUS at 05:40

## 2024-10-06 RX ADMIN — SODIUM CHLORIDE, PRESERVATIVE FREE 10 ML: 5 INJECTION INTRAVENOUS at 20:42

## 2024-10-06 RX ADMIN — PIPERACILLIN AND TAZOBACTAM 4500 MG: 4; .5 INJECTION, POWDER, FOR SOLUTION INTRAVENOUS at 06:46

## 2024-10-06 RX ADMIN — LEVETIRACETAM 500 MG: 100 INJECTION INTRAVENOUS at 20:41

## 2024-10-06 RX ADMIN — ANTI-FUNGAL POWDER MICONAZOLE NITRATE TALC FREE: 1.42 POWDER TOPICAL at 09:33

## 2024-10-06 RX ADMIN — MICONAZOLE NITRATE: 20 OINTMENT TOPICAL at 09:33

## 2024-10-06 RX ADMIN — SODIUM CHLORIDE 1250 MG: 9 INJECTION, SOLUTION INTRAVENOUS at 23:08

## 2024-10-06 ASSESSMENT — PAIN DESCRIPTION - LOCATION: LOCATION: GENERALIZED

## 2024-10-06 ASSESSMENT — PAIN DESCRIPTION - ORIENTATION: ORIENTATION: MID

## 2024-10-06 ASSESSMENT — PAIN DESCRIPTION - DESCRIPTORS: DESCRIPTORS: ACHING;DISCOMFORT

## 2024-10-06 ASSESSMENT — PAIN SCALES - GENERAL: PAINLEVEL_OUTOF10: 7

## 2024-10-06 NOTE — PLAN OF CARE
Contacted by nurse about hemoglobin 6.4, 1 unit PRBC ordered.  Developing edema,  cc/hr discontinued

## 2024-10-06 NOTE — PATIENT CARE CONFERENCE
[facility] Quality Flow/Interdisciplinary Rounds Progress Note        Quality Flow Rounds held on October 6, 2024    Disciplines Attending:  Bedside Nurse and Nursing Unit Leadership    Lissa Diggs was admitted on 10/4/2024  6:16 PM    Anticipated Discharge Date:       Disposition:    Rylan Score:  Rylan Scale Score: 11    Readmission Risk              Risk of Unplanned Readmission:  8           Discussed patient goal for the day, patient clinical progression, and barriers to discharge.  The following Goal(s) of the Day/Commitment(s) have been identified:  Labs - Report Results and to tolerate tube feedings      Kelli Stroud RN  October 6, 2024

## 2024-10-06 NOTE — PLAN OF CARE
Problem: Discharge Planning  Goal: Discharge to home or other facility with appropriate resources  10/6/2024 0754 by Martha Goff RN  Outcome: Progressing  10/6/2024 0036 by Marisela Bonner RN  Outcome: Progressing     Problem: Skin/Tissue Integrity  Goal: Absence of new skin breakdown  Description: 1.  Monitor for areas of redness and/or skin breakdown  2.  Assess vascular access sites hourly  3.  Every 4-6 hours minimum:  Change oxygen saturation probe site  4.  Every 4-6 hours:  If on nasal continuous positive airway pressure, respiratory therapy assess nares and determine need for appliance change or resting period.  10/6/2024 0754 by Martha Goff RN  Outcome: Progressing  10/6/2024 0036 by Marisela Bonner RN  Outcome: Progressing     Problem: Safety - Adult  Goal: Free from fall injury  10/6/2024 0754 by Martha Goff RN  Outcome: Progressing  10/6/2024 0036 by Marisela Bonner RN  Outcome: Progressing     Problem: Pain  Goal: Verbalizes/displays adequate comfort level or baseline comfort level  10/6/2024 0754 by Martha Goff RN  Outcome: Progressing  10/6/2024 0036 by Marisela Bonner RN  Outcome: Progressing

## 2024-10-07 ENCOUNTER — APPOINTMENT (OUTPATIENT)
Dept: NEUROLOGY | Age: 59
DRG: 698 | End: 2024-10-07
Attending: INTERNAL MEDICINE
Payer: COMMERCIAL

## 2024-10-07 LAB
ABO/RH: NORMAL
ANION GAP SERPL CALCULATED.3IONS-SCNC: 8 MMOL/L (ref 7–16)
ANTIBODY SCREEN: NEGATIVE
ARM BAND NUMBER: NORMAL
ARM BAND NUMBER: NORMAL
BLOOD BANK BLOOD PRODUCT EXPIRATION DATE: NORMAL
BLOOD BANK DISPENSE STATUS: NORMAL
BLOOD BANK ISBT PRODUCT BLOOD TYPE: 5100
BLOOD BANK ISBT PRODUCT BLOOD TYPE: 6200
BLOOD BANK ISBT PRODUCT BLOOD TYPE: 9500
BLOOD BANK PRODUCT CODE: NORMAL
BLOOD BANK SAMPLE EXPIRATION: NORMAL
BLOOD BANK UNIT TYPE AND RH: NORMAL
BPU ID: NORMAL
BUN SERPL-MCNC: 22 MG/DL (ref 6–23)
CALCIUM SERPL-MCNC: 9.3 MG/DL (ref 8.6–10.2)
CHLORIDE SERPL-SCNC: 109 MMOL/L (ref 98–107)
CO2 SERPL-SCNC: 23 MMOL/L (ref 22–29)
COMPONENT: NORMAL
CREAT SERPL-MCNC: 0.7 MG/DL (ref 0.5–1)
CROSSMATCH RESULT: NORMAL
CROSSMATCH RESULT: NORMAL
DATE LAST DOSE: ABNORMAL
ERYTHROCYTE [DISTWIDTH] IN BLOOD BY AUTOMATED COUNT: 19.9 % (ref 11.5–15)
GFR, ESTIMATED: >90 ML/MIN/1.73M2
GLUCOSE BLD-MCNC: 172 MG/DL (ref 74–99)
GLUCOSE BLD-MCNC: 177 MG/DL (ref 74–99)
GLUCOSE BLD-MCNC: 205 MG/DL (ref 74–99)
GLUCOSE BLD-MCNC: 220 MG/DL (ref 74–99)
GLUCOSE SERPL-MCNC: 205 MG/DL (ref 74–99)
HCT VFR BLD AUTO: 26.3 % (ref 34–48)
HGB BLD-MCNC: 8.5 G/DL (ref 11.5–15.5)
MCH RBC QN AUTO: 34.3 PG (ref 26–35)
MCHC RBC AUTO-ENTMCNC: 32.3 G/DL (ref 32–34.5)
MCV RBC AUTO: 106 FL (ref 80–99.9)
MICROORGANISM SPEC CULT: ABNORMAL
MICROORGANISM/AGENT SPEC: ABNORMAL
PLATELET # BLD AUTO: 53 K/UL (ref 130–450)
PLATELET CONFIRMATION: NORMAL
PMV BLD AUTO: 10.5 FL (ref 7–12)
POTASSIUM SERPL-SCNC: 4.5 MMOL/L (ref 3.5–5)
RBC # BLD AUTO: 2.48 M/UL (ref 3.5–5.5)
SERVICE CMNT-IMP: ABNORMAL
SODIUM SERPL-SCNC: 140 MMOL/L (ref 132–146)
SPECIMEN DESCRIPTION: ABNORMAL
TME LAST DOSE: ABNORMAL H
TRANSFUSION STATUS: NORMAL
UNIT DIVISION: 0
UNIT ISSUE DATE/TIME: NORMAL
VANCOMYCIN DOSE: ABNORMAL MG
VANCOMYCIN TROUGH SERPL-MCNC: 37 UG/ML (ref 5–16)
WBC OTHER # BLD: 3.7 K/UL (ref 4.5–11.5)

## 2024-10-07 PROCEDURE — 6360000002 HC RX W HCPCS: Performed by: INTERNAL MEDICINE

## 2024-10-07 PROCEDURE — 76937 US GUIDE VASCULAR ACCESS: CPT

## 2024-10-07 PROCEDURE — 95816 EEG AWAKE AND DROWSY: CPT | Performed by: PSYCHIATRY & NEUROLOGY

## 2024-10-07 PROCEDURE — 36415 COLL VENOUS BLD VENIPUNCTURE: CPT

## 2024-10-07 PROCEDURE — 80202 ASSAY OF VANCOMYCIN: CPT

## 2024-10-07 PROCEDURE — 95816 EEG AWAKE AND DROWSY: CPT

## 2024-10-07 PROCEDURE — 82180 ASSAY OF ASCORBIC ACID: CPT

## 2024-10-07 PROCEDURE — 85027 COMPLETE CBC AUTOMATED: CPT

## 2024-10-07 PROCEDURE — 6370000000 HC RX 637 (ALT 250 FOR IP): Performed by: INTERNAL MEDICINE

## 2024-10-07 PROCEDURE — 99232 SBSQ HOSP IP/OBS MODERATE 35: CPT | Performed by: INTERNAL MEDICINE

## 2024-10-07 PROCEDURE — 80048 BASIC METABOLIC PNL TOTAL CA: CPT

## 2024-10-07 PROCEDURE — 82962 GLUCOSE BLOOD TEST: CPT

## 2024-10-07 PROCEDURE — 2140000000 HC CCU INTERMEDIATE R&B

## 2024-10-07 PROCEDURE — 2580000003 HC RX 258: Performed by: INTERNAL MEDICINE

## 2024-10-07 PROCEDURE — 6360000002 HC RX W HCPCS

## 2024-10-07 PROCEDURE — 2580000003 HC RX 258

## 2024-10-07 PROCEDURE — 6370000000 HC RX 637 (ALT 250 FOR IP)

## 2024-10-07 RX ORDER — LORAZEPAM 2 MG/ML
1 INJECTION INTRAMUSCULAR
Status: ACTIVE | OUTPATIENT
Start: 2024-10-07 | End: 2024-10-08

## 2024-10-07 RX ADMIN — OXYCODONE HYDROCHLORIDE 5 MG: 5 TABLET ORAL at 17:54

## 2024-10-07 RX ADMIN — PIPERACILLIN AND TAZOBACTAM 4500 MG: 4; .5 INJECTION, POWDER, FOR SOLUTION INTRAVENOUS at 17:08

## 2024-10-07 RX ADMIN — MICONAZOLE NITRATE: 20 OINTMENT TOPICAL at 21:22

## 2024-10-07 RX ADMIN — LEVETIRACETAM 500 MG: 100 INJECTION INTRAVENOUS at 21:21

## 2024-10-07 RX ADMIN — SODIUM CHLORIDE, PRESERVATIVE FREE 10 ML: 5 INJECTION INTRAVENOUS at 21:23

## 2024-10-07 RX ADMIN — PIPERACILLIN AND TAZOBACTAM 4500 MG: 4; .5 INJECTION, POWDER, FOR SOLUTION INTRAVENOUS at 00:52

## 2024-10-07 RX ADMIN — INSULIN LISPRO 2 UNITS: 100 INJECTION, SOLUTION INTRAVENOUS; SUBCUTANEOUS at 17:03

## 2024-10-07 RX ADMIN — LEVETIRACETAM 500 MG: 100 INJECTION INTRAVENOUS at 08:46

## 2024-10-07 RX ADMIN — ACETAMINOPHEN 650 MG: 325 TABLET ORAL at 12:21

## 2024-10-07 RX ADMIN — PIPERACILLIN AND TAZOBACTAM 4500 MG: 4; .5 INJECTION, POWDER, FOR SOLUTION INTRAVENOUS at 08:57

## 2024-10-07 RX ADMIN — OXYCODONE HYDROCHLORIDE 5 MG: 5 TABLET ORAL at 05:21

## 2024-10-07 RX ADMIN — INSULIN LISPRO 2 UNITS: 100 INJECTION, SOLUTION INTRAVENOUS; SUBCUTANEOUS at 11:43

## 2024-10-07 RX ADMIN — ANTI-FUNGAL POWDER MICONAZOLE NITRATE TALC FREE: 1.42 POWDER TOPICAL at 11:45

## 2024-10-07 RX ADMIN — ACETAMINOPHEN 650 MG: 325 TABLET ORAL at 21:31

## 2024-10-07 RX ADMIN — SODIUM CHLORIDE, PRESERVATIVE FREE 10 ML: 5 INJECTION INTRAVENOUS at 08:58

## 2024-10-07 RX ADMIN — Medication 500 MG: at 08:46

## 2024-10-07 RX ADMIN — SODIUM CHLORIDE 1250 MG: 9 INJECTION, SOLUTION INTRAVENOUS at 12:25

## 2024-10-07 RX ADMIN — Medication 3 MG: at 21:21

## 2024-10-07 RX ADMIN — ANTI-FUNGAL POWDER MICONAZOLE NITRATE TALC FREE: 1.42 POWDER TOPICAL at 21:21

## 2024-10-07 RX ADMIN — MICONAZOLE NITRATE: 20 OINTMENT TOPICAL at 11:46

## 2024-10-07 ASSESSMENT — PAIN - FUNCTIONAL ASSESSMENT
PAIN_FUNCTIONAL_ASSESSMENT: ACTIVITIES ARE NOT PREVENTED

## 2024-10-07 ASSESSMENT — PAIN SCALES - GENERAL
PAINLEVEL_OUTOF10: 8
PAINLEVEL_OUTOF10: 3
PAINLEVEL_OUTOF10: 10
PAINLEVEL_OUTOF10: 3

## 2024-10-07 ASSESSMENT — PAIN DESCRIPTION - DESCRIPTORS
DESCRIPTORS: ACHING;DISCOMFORT;SORE

## 2024-10-07 ASSESSMENT — PAIN DESCRIPTION - LOCATION
LOCATION: BUTTOCKS

## 2024-10-07 ASSESSMENT — PAIN DESCRIPTION - ORIENTATION
ORIENTATION: MID
ORIENTATION: MID
ORIENTATION: RIGHT;LEFT
ORIENTATION: MID

## 2024-10-07 NOTE — ACP (ADVANCE CARE PLANNING)
Advance Care Planning   Healthcare Decision Maker:    Primary Decision Maker: olimpia raymond - Spouse - [phone]    Secondary Decision Maker: Nya Raymond - Child - [phone]    Click here to complete Healthcare Decision Makers including selection of the Healthcare Decision Maker Relationship (ie \"Primary\").

## 2024-10-07 NOTE — PATIENT CARE CONFERENCE
P Quality Flow/Interdisciplinary Rounds Progress Note        Quality Flow Rounds held on October 7, 2024    Disciplines Attending:  Bedside Nurse, , , and Nursing Unit Leadership    Lissa Diggs was admitted on 10/4/2024  6:16 PM    Anticipated Discharge Date:       Disposition:    Rylan Score:  Rylan Scale Score: 11    Readmission Risk              Risk of Unplanned Readmission:  10           Discussed patient goal for the day, patient clinical progression, and barriers to discharge.  The following Goal(s) of the Day/Commitment(s) have been identified:  Report labs/diagnostics      Keila Almaguer RN  October 7, 2024

## 2024-10-07 NOTE — PROCEDURES
[facility] Neurodiagnostic Report    MRN: [id number]  PATIENT NAME: Lissa Diggs  DATE OF REPORT: 10/7/2024    DATE OF SERVICE: 10/7/2024  PHYSICIAN NAME: Sathish Card DO  STUDY ORDERED BY: CHIQUIS Capellan      Patient's : 1965  Patient's Age: 59 y.o.  Gender: female    PROCEDURE: Routine EEG with video      Clinical Interpretation:   This abnormal study showed evidence of:    A moderate nonspecific encephalopathy    No seizures or epileptiform discharges were noted during this study.     __________________________  Electronically signed by: Sathish Card DO, 10/7/2024 10:29 AM      Patient Clinical Information   Reason for Study: The patient is undergoing evaluation for altered mental status  Patient State: Somnolent  Primary neurological diagnosis: Altered mental status  Primary indication for monitoring: Diagnosis of nonconvulsive seizures    Pertinent Medications and Treatments    oxycodone     Lorazepam     levetiracetam     Sedatives administered: No  Intubated: No  Pharmacological paralytic: No    Reporting Period  Start of Study: 942, 10/7/2024   End of Study:  , 10/7/2024       EEG Description  Digital video and scalp EEG monitoring was performed using the standard protocol for this laboratory. Scalp electrodes were applied in the international 10/20 system. Multiple digital montage arrangements were utilized for evaluation. EKG and video were recorded. Occasional movement and electrode artifact noted intermittently throughout this study.     Background:      Occipital rhythm (posterior dominant rhythm or PDR): Absent   Voltage: Medium  Organization: fair  Reactivity to eye opening/closure: Not tested    Drowsiness: Absent  Sleep: Absent    Comments: In the waking state the background is composed primarily of generalized irregular theta activity.    Technical and Activation Procedures:  Hyperventilation: Not done  Photic stimulation: Done - physiologic

## 2024-10-07 NOTE — PLAN OF CARE
Problem: Discharge Planning  Goal: Discharge to home or other facility with appropriate resources  10/7/2024 0936 by Sarah Bermeo RN  Outcome: Progressing     Problem: Skin/Tissue Integrity  Goal: Absence of new skin breakdown  Description: 1.  Monitor for areas of redness and/or skin breakdown  2.  Assess vascular access sites hourly  3.  Every 4-6 hours minimum:  Change oxygen saturation probe site  4.  Every 4-6 hours:  If on nasal continuous positive airway pressure, respiratory therapy assess nares and determine need for appliance change or resting period.  10/7/2024 0936 by Sarah Bermeo, RN  Outcome: Progressing     Problem: Safety - Adult  Goal: Free from fall injury  10/7/2024 0936 by Sarah Bermeo RN  Outcome: Progressing     Problem: Pain  Goal: Verbalizes/displays adequate comfort level or baseline comfort level  10/7/2024 0936 by Sarah Bermeo RN  Outcome: Progressing     Problem: Nutrition Deficit:  Goal: Optimize nutritional status  10/7/2024 0936 by Sarah Bermeo, RN  Outcome: Progressing

## 2024-10-08 PROBLEM — Z51.5 PALLIATIVE CARE ENCOUNTER: Status: ACTIVE | Noted: 2024-10-08

## 2024-10-08 PROBLEM — Z16.12 UTI DUE TO EXTENDED-SPECTRUM BETA LACTAMASE (ESBL) PRODUCING ESCHERICHIA COLI: Status: ACTIVE | Noted: 2024-10-08

## 2024-10-08 PROBLEM — B96.29 UTI DUE TO EXTENDED-SPECTRUM BETA LACTAMASE (ESBL) PRODUCING ESCHERICHIA COLI: Status: ACTIVE | Noted: 2024-10-08

## 2024-10-08 PROBLEM — N39.0 UTI DUE TO EXTENDED-SPECTRUM BETA LACTAMASE (ESBL) PRODUCING ESCHERICHIA COLI: Status: ACTIVE | Noted: 2024-10-08

## 2024-10-08 LAB
ANION GAP SERPL CALCULATED.3IONS-SCNC: 7 MMOL/L (ref 7–16)
BUN SERPL-MCNC: 21 MG/DL (ref 6–23)
CALCIUM SERPL-MCNC: 9.2 MG/DL (ref 8.6–10.2)
CHLORIDE SERPL-SCNC: 111 MMOL/L (ref 98–107)
CO2 SERPL-SCNC: 23 MMOL/L (ref 22–29)
CREAT SERPL-MCNC: 0.7 MG/DL (ref 0.5–1)
DATE LAST DOSE: NORMAL
ERYTHROCYTE [DISTWIDTH] IN BLOOD BY AUTOMATED COUNT: 19.3 % (ref 11.5–15)
GFR, ESTIMATED: >90 ML/MIN/1.73M2
GLUCOSE BLD-MCNC: 177 MG/DL (ref 74–99)
GLUCOSE BLD-MCNC: 179 MG/DL (ref 74–99)
GLUCOSE BLD-MCNC: 192 MG/DL (ref 74–99)
GLUCOSE BLD-MCNC: 213 MG/DL (ref 74–99)
GLUCOSE SERPL-MCNC: 193 MG/DL (ref 74–99)
HCT VFR BLD AUTO: 27.8 % (ref 34–48)
HGB BLD-MCNC: 8.9 G/DL (ref 11.5–15.5)
MCH RBC QN AUTO: 33.8 PG (ref 26–35)
MCHC RBC AUTO-ENTMCNC: 32 G/DL (ref 32–34.5)
MCV RBC AUTO: 105.7 FL (ref 80–99.9)
MICROORGANISM SPEC CULT: ABNORMAL
MICROORGANISM/AGENT SPEC: ABNORMAL
PLATELET # BLD AUTO: 68 K/UL (ref 130–450)
PLATELET CONFIRMATION: NORMAL
PMV BLD AUTO: 11 FL (ref 7–12)
POTASSIUM SERPL-SCNC: 4.7 MMOL/L (ref 3.5–5)
RBC # BLD AUTO: 2.63 M/UL (ref 3.5–5.5)
SERVICE CMNT-IMP: ABNORMAL
SODIUM SERPL-SCNC: 141 MMOL/L (ref 132–146)
SPECIMEN DESCRIPTION: ABNORMAL
TME LAST DOSE: NORMAL H
VANCOMYCIN DOSE: NORMAL MG
VANCOMYCIN SERPL-MCNC: 26 UG/ML (ref 5–40)
WBC OTHER # BLD: 4.2 K/UL (ref 4.5–11.5)

## 2024-10-08 PROCEDURE — 82962 GLUCOSE BLOOD TEST: CPT

## 2024-10-08 PROCEDURE — 99232 SBSQ HOSP IP/OBS MODERATE 35: CPT | Performed by: INTERNAL MEDICINE

## 2024-10-08 PROCEDURE — 2700000000 HC OXYGEN THERAPY PER DAY

## 2024-10-08 PROCEDURE — 97165 OT EVAL LOW COMPLEX 30 MIN: CPT

## 2024-10-08 PROCEDURE — 2060000000 HC ICU INTERMEDIATE R&B

## 2024-10-08 PROCEDURE — 2580000003 HC RX 258

## 2024-10-08 PROCEDURE — 6360000002 HC RX W HCPCS

## 2024-10-08 PROCEDURE — 85027 COMPLETE CBC AUTOMATED: CPT

## 2024-10-08 PROCEDURE — 80202 ASSAY OF VANCOMYCIN: CPT

## 2024-10-08 PROCEDURE — 97530 THERAPEUTIC ACTIVITIES: CPT

## 2024-10-08 PROCEDURE — 6360000002 HC RX W HCPCS: Performed by: INTERNAL MEDICINE

## 2024-10-08 PROCEDURE — 6370000000 HC RX 637 (ALT 250 FOR IP): Performed by: HOSPITALIST

## 2024-10-08 PROCEDURE — 80048 BASIC METABOLIC PNL TOTAL CA: CPT

## 2024-10-08 PROCEDURE — 6370000000 HC RX 637 (ALT 250 FOR IP): Performed by: INTERNAL MEDICINE

## 2024-10-08 PROCEDURE — 99231 SBSQ HOSP IP/OBS SF/LOW 25: CPT | Performed by: CLINICAL NURSE SPECIALIST

## 2024-10-08 PROCEDURE — 97161 PT EVAL LOW COMPLEX 20 MIN: CPT

## 2024-10-08 PROCEDURE — 2580000003 HC RX 258: Performed by: INTERNAL MEDICINE

## 2024-10-08 PROCEDURE — 99232 SBSQ HOSP IP/OBS MODERATE 35: CPT | Performed by: NURSE PRACTITIONER

## 2024-10-08 PROCEDURE — 6370000000 HC RX 637 (ALT 250 FOR IP)

## 2024-10-08 PROCEDURE — 36415 COLL VENOUS BLD VENIPUNCTURE: CPT

## 2024-10-08 PROCEDURE — 94640 AIRWAY INHALATION TREATMENT: CPT

## 2024-10-08 RX ORDER — RIFAXIMIN 550 MG/1
550 TABLET ORAL 2 TIMES DAILY
Status: ON HOLD | COMMUNITY
Start: 2024-09-11 | End: 2024-10-11 | Stop reason: HOSPADM

## 2024-10-08 RX ORDER — HYDROXYZINE PAMOATE 50 MG/1
50 CAPSULE ORAL NIGHTLY
Status: ON HOLD | COMMUNITY
Start: 2024-09-05 | End: 2024-10-11 | Stop reason: HOSPADM

## 2024-10-08 RX ORDER — GLIMEPIRIDE 2 MG/1
2 TABLET ORAL
Status: ON HOLD | COMMUNITY
Start: 2024-08-26 | End: 2024-10-11 | Stop reason: HOSPADM

## 2024-10-08 RX ORDER — METOPROLOL SUCCINATE 25 MG/1
25 TABLET, EXTENDED RELEASE ORAL DAILY
Status: ON HOLD | COMMUNITY
Start: 2024-09-11 | End: 2024-10-11 | Stop reason: HOSPADM

## 2024-10-08 RX ORDER — COLLAGENASE SANTYL 250 [ARB'U]/G
1 OINTMENT TOPICAL DAILY
COMMUNITY
Start: 2024-10-02

## 2024-10-08 RX ORDER — SPIRONOLACTONE 100 MG/1
2 TABLET, FILM COATED ORAL DAILY
Status: ON HOLD | COMMUNITY
Start: 2024-04-10 | End: 2024-10-11 | Stop reason: HOSPADM

## 2024-10-08 RX ORDER — NYSTATIN 100000 [USP'U]/G
1 POWDER TOPICAL 2 TIMES DAILY
Status: ON HOLD | COMMUNITY
Start: 2024-09-14 | End: 2024-10-11 | Stop reason: HOSPADM

## 2024-10-08 RX ORDER — IPRATROPIUM BROMIDE AND ALBUTEROL SULFATE 2.5; .5 MG/3ML; MG/3ML
1 SOLUTION RESPIRATORY (INHALATION) EVERY 4 HOURS PRN
Status: DISCONTINUED | OUTPATIENT
Start: 2024-10-08 | End: 2024-10-17 | Stop reason: HOSPADM

## 2024-10-08 RX ORDER — ALPRAZOLAM 0.25 MG
0.5 TABLET ORAL ONCE
Status: COMPLETED | OUTPATIENT
Start: 2024-10-08 | End: 2024-10-08

## 2024-10-08 RX ORDER — MAGNESIUM OXIDE 400 MG/1
400 TABLET ORAL 2 TIMES DAILY
Status: ON HOLD | COMMUNITY
End: 2024-10-11 | Stop reason: HOSPADM

## 2024-10-08 RX ORDER — FUROSEMIDE 40 MG
40 TABLET ORAL 2 TIMES DAILY
Status: ON HOLD | COMMUNITY
End: 2024-10-11 | Stop reason: HOSPADM

## 2024-10-08 RX ORDER — NYSTATIN 100000 [USP'U]/G
1 POWDER TOPICAL DAILY
Status: ON HOLD | COMMUNITY
End: 2024-10-11 | Stop reason: HOSPADM

## 2024-10-08 RX ORDER — ONDANSETRON 4 MG/1
4 TABLET, FILM COATED ORAL EVERY 8 HOURS PRN
Status: ON HOLD | COMMUNITY
Start: 2024-08-12 | End: 2024-10-11 | Stop reason: HOSPADM

## 2024-10-08 RX ORDER — GLUCAGON HYDROCHLORIDE 1 MG/ML
1 INJECTION, POWDER, FOR SOLUTION INTRAMUSCULAR; INTRAVENOUS; SUBCUTANEOUS AS NEEDED
Status: ON HOLD | COMMUNITY
End: 2024-10-11 | Stop reason: HOSPADM

## 2024-10-08 RX ORDER — NICOTINE POLACRILEX 4 MG
10 LOZENGE BUCCAL PRN
COMMUNITY

## 2024-10-08 RX ORDER — ASCORBIC ACID 500 MG
500 TABLET ORAL 2 TIMES DAILY
COMMUNITY

## 2024-10-08 RX ORDER — LACTULOSE 10 G/15ML
20 SOLUTION ORAL; RECTAL DAILY
Status: ON HOLD | COMMUNITY
Start: 2024-08-17 | End: 2024-10-11 | Stop reason: HOSPADM

## 2024-10-08 RX ORDER — LANOLIN ALCOHOL/MO/W.PET/CERES
5 CREAM (GRAM) TOPICAL NIGHTLY
Status: ON HOLD | COMMUNITY
End: 2024-10-16 | Stop reason: HOSPADM

## 2024-10-08 RX ORDER — METOCLOPRAMIDE 5 MG/1
5 TABLET ORAL 2 TIMES DAILY
Status: ON HOLD | COMMUNITY
Start: 2024-08-30 | End: 2024-10-11 | Stop reason: HOSPADM

## 2024-10-08 RX ORDER — OXYCODONE HYDROCHLORIDE 5 MG/1
5 TABLET ORAL EVERY 6 HOURS PRN
Status: ON HOLD | COMMUNITY
Start: 2024-09-05 | End: 2024-10-11 | Stop reason: HOSPADM

## 2024-10-08 RX ORDER — BISACODYL 10 MG
10 SUPPOSITORY, RECTAL RECTAL DAILY PRN
COMMUNITY

## 2024-10-08 RX ORDER — POLYETHYLENE GLYCOL 3350 17 G/17G
17 POWDER, FOR SOLUTION ORAL 2 TIMES DAILY
COMMUNITY

## 2024-10-08 RX ORDER — ALLOPURINOL 100 MG/1
100 TABLET ORAL 2 TIMES DAILY
COMMUNITY
Start: 2024-08-23

## 2024-10-08 RX ADMIN — LEVETIRACETAM 500 MG: 100 INJECTION INTRAVENOUS at 08:55

## 2024-10-08 RX ADMIN — Medication 3 MG: at 22:31

## 2024-10-08 RX ADMIN — SODIUM CHLORIDE, PRESERVATIVE FREE 10 ML: 5 INJECTION INTRAVENOUS at 22:35

## 2024-10-08 RX ADMIN — MICONAZOLE NITRATE: 20 OINTMENT TOPICAL at 08:58

## 2024-10-08 RX ADMIN — ANTI-FUNGAL POWDER MICONAZOLE NITRATE TALC FREE: 1.42 POWDER TOPICAL at 08:57

## 2024-10-08 RX ADMIN — OXYCODONE HYDROCHLORIDE 5 MG: 5 TABLET ORAL at 00:31

## 2024-10-08 RX ADMIN — MEROPENEM 1000 MG: 1 INJECTION INTRAVENOUS at 22:34

## 2024-10-08 RX ADMIN — OXYCODONE HYDROCHLORIDE 5 MG: 5 TABLET ORAL at 12:19

## 2024-10-08 RX ADMIN — PIPERACILLIN AND TAZOBACTAM 4500 MG: 4; .5 INJECTION, POWDER, FOR SOLUTION INTRAVENOUS at 00:28

## 2024-10-08 RX ADMIN — Medication 500 MG: at 08:54

## 2024-10-08 RX ADMIN — ALPRAZOLAM 0.5 MG: 0.25 TABLET ORAL at 05:48

## 2024-10-08 RX ADMIN — IPRATROPIUM BROMIDE AND ALBUTEROL SULFATE 1 DOSE: 2.5; .5 SOLUTION RESPIRATORY (INHALATION) at 19:36

## 2024-10-08 RX ADMIN — PIPERACILLIN AND TAZOBACTAM 4500 MG: 4; .5 INJECTION, POWDER, FOR SOLUTION INTRAVENOUS at 09:05

## 2024-10-08 RX ADMIN — LEVETIRACETAM 500 MG: 100 INJECTION INTRAVENOUS at 22:31

## 2024-10-08 RX ADMIN — COLLAGENASE SANTYL: 250 OINTMENT TOPICAL at 12:05

## 2024-10-08 RX ADMIN — SODIUM CHLORIDE, PRESERVATIVE FREE 10 ML: 5 INJECTION INTRAVENOUS at 09:10

## 2024-10-08 RX ADMIN — MEROPENEM 1000 MG: 1 INJECTION INTRAVENOUS at 15:08

## 2024-10-08 ASSESSMENT — PAIN DESCRIPTION - DESCRIPTORS
DESCRIPTORS: ACHING
DESCRIPTORS: ACHING;DISCOMFORT;SORE

## 2024-10-08 ASSESSMENT — PAIN DESCRIPTION - LOCATION
LOCATION: GENERALIZED
LOCATION: LEG

## 2024-10-08 ASSESSMENT — PAIN SCALES - WONG BAKER: WONGBAKER_NUMERICALRESPONSE: NO HURT

## 2024-10-08 ASSESSMENT — PAIN SCALES - GENERAL
PAINLEVEL_OUTOF10: 7
PAINLEVEL_OUTOF10: 7
PAINLEVEL_OUTOF10: 0

## 2024-10-08 ASSESSMENT — PAIN DESCRIPTION - ORIENTATION: ORIENTATION: RIGHT

## 2024-10-08 ASSESSMENT — PAIN - FUNCTIONAL ASSESSMENT: PAIN_FUNCTIONAL_ASSESSMENT: ACTIVITIES ARE NOT PREVENTED

## 2024-10-08 NOTE — FLOWSHEET NOTE
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  Was initially brought to [facility].   at bedside states that she had fall 3 months ago and her mentation has deteriorated since then.  Patient has history of multiple wounds, had sacral wound debridement with general surgery in September, was being treated for UTI at [facility].  Has history of Pseudomonas in urine culture.  Decision was made to transfer patient to [facility] main campus for neurology evaluation for altered mental status and possible seizure-like activity.     Wound history:  admitted with wounds from facility    Findings:     10/08/24 0940   Skin Integumentary    Skin Condition/Temp Poor turgor   Skin Integrity Ecchymosis   Location BUE, BLE, trunk   Skin Fold Management Yes   Dressing Site Abdominal pannus;Breasts;Groin   Treatment Pharmaceutical   Date Applied   (nurse to apply)   Assessed This Shift Red;Moist;Open   Skin Integrity Site 2   Skin Integrity Location 2 Excoriation;Redness   Location 2 groins, abdominal fold, breast folds, leg folds, back folds, medial thighs   Skin Integrity Site 3   Skin Integrity Location 3 Vascular discoloration  (dry flaky)    Location 3 BLE, feet   Skin Integrity Site 4   Skin Integrity Location 4 Erosion/denuded  (purple striated areas,)   Location 4 buttocks, posterior thighs, mid back   Wound 10/04/24 Hip Right   Date First Assessed/Time First Assessed: 10/04/24 2030   Present on Original Admission: Yes  Primary Wound Type: Pressure Injury  Location: Hip  Wound Location

## 2024-10-08 NOTE — PLAN OF CARE
Problem: Discharge Planning  Goal: Discharge to home or other facility with appropriate resources  10/8/2024 0959 by Kyle Mina RN  Outcome: Progressing  10/7/2024 2019 by Marisela Bonner RN  Outcome: Progressing     Problem: Skin/Tissue Integrity  Goal: Absence of new skin breakdown  Description: 1.  Monitor for areas of redness and/or skin breakdown  2.  Assess vascular access sites hourly  3.  Every 4-6 hours minimum:  Change oxygen saturation probe site  4.  Every 4-6 hours:  If on nasal continuous positive airway pressure, respiratory therapy assess nares and determine need for appliance change or resting period.  10/8/2024 0959 by Kyle Mina RN  Outcome: Progressing  10/7/2024 2019 by Marisela Bonner RN  Outcome: Progressing     Problem: Safety - Adult  Goal: Free from fall injury  10/8/2024 0959 by Kyle Mina RN  Outcome: Progressing  10/7/2024 2019 by Marisela Bonner RN  Outcome: Progressing     Problem: Pain  Goal: Verbalizes/displays adequate comfort level or baseline comfort level  10/8/2024 0959 by Kyle Mina RN  Outcome: Progressing  10/7/2024 2019 by Marisela Bonner RN  Outcome: Progressing     Problem: Nutrition Deficit:  Goal: Optimize nutritional status  10/8/2024 0959 by Klye Mina RN  Outcome: Progressing  10/7/2024 2019 by Marisela Bonner RN  Outcome: Progressing     Problem: ABCDS Injury Assessment  Goal: Absence of physical injury  10/8/2024 0959 by Kyle Mina RN  Outcome: Progressing  10/7/2024 2019 by Marisela Bonner RN  Outcome: Progressing

## 2024-10-08 NOTE — PLAN OF CARE
Problem: Discharge Planning  Goal: Discharge to home or other facility with appropriate resources  10/7/2024 2019 by Marisela Bonner, RN  Outcome: Progressing     Problem: Skin/Tissue Integrity  Goal: Absence of new skin breakdown  Description: 1.  Monitor for areas of redness and/or skin breakdown  2.  Assess vascular access sites hourly  3.  Every 4-6 hours minimum:  Change oxygen saturation probe site  4.  Every 4-6 hours:  If on nasal continuous positive airway pressure, respiratory therapy assess nares and determine need for appliance change or resting period.  10/7/2024 2019 by Marisela Bonner, RN  Outcome: Progressing     Problem: Safety - Adult  Goal: Free from fall injury  10/7/2024 2019 by Marisela Bonner, RN  Outcome: Progressing     Problem: Pain  Goal: Verbalizes/displays adequate comfort level or baseline comfort level  10/7/2024 2019 by Marisela Bonner, RN  Outcome: Progressing     Problem: Nutrition Deficit:  Goal: Optimize nutritional status  10/7/2024 2019 by Marisela Bonner, RN  Outcome: Progressing     Problem: ABCDS Injury Assessment  Goal: Absence of physical injury  Outcome: Progressing

## 2024-10-09 ENCOUNTER — APPOINTMENT (OUTPATIENT)
Dept: MRI IMAGING | Age: 59
DRG: 698 | End: 2024-10-09
Attending: INTERNAL MEDICINE
Payer: COMMERCIAL

## 2024-10-09 ENCOUNTER — APPOINTMENT (OUTPATIENT)
Dept: GENERAL RADIOLOGY | Age: 59
DRG: 698 | End: 2024-10-09
Attending: INTERNAL MEDICINE
Payer: COMMERCIAL

## 2024-10-09 LAB
ANION GAP SERPL CALCULATED.3IONS-SCNC: 9 MMOL/L (ref 7–16)
BASOPHILS # BLD: 0.03 K/UL (ref 0–0.2)
BASOPHILS NFR BLD: 1 % (ref 0–2)
BUN SERPL-MCNC: 20 MG/DL (ref 6–23)
CALCIUM SERPL-MCNC: 9 MG/DL (ref 8.6–10.2)
CHLORIDE SERPL-SCNC: 109 MMOL/L (ref 98–107)
CO2 SERPL-SCNC: 24 MMOL/L (ref 22–29)
CREAT SERPL-MCNC: 0.7 MG/DL (ref 0.5–1)
EOSINOPHIL # BLD: 0.18 K/UL (ref 0.05–0.5)
EOSINOPHILS RELATIVE PERCENT: 4 % (ref 0–6)
ERYTHROCYTE [DISTWIDTH] IN BLOOD BY AUTOMATED COUNT: 19 % (ref 11.5–15)
GFR, ESTIMATED: >90 ML/MIN/1.73M2
GLUCOSE BLD-MCNC: 168 MG/DL (ref 74–99)
GLUCOSE BLD-MCNC: 174 MG/DL (ref 74–99)
GLUCOSE BLD-MCNC: 192 MG/DL (ref 74–99)
GLUCOSE BLD-MCNC: 193 MG/DL (ref 74–99)
GLUCOSE SERPL-MCNC: 198 MG/DL (ref 74–99)
HCT VFR BLD AUTO: 28.7 % (ref 34–48)
HGB BLD-MCNC: 9.1 G/DL (ref 11.5–15.5)
IMM GRANULOCYTES # BLD AUTO: <0.03 K/UL (ref 0–0.58)
IMM GRANULOCYTES NFR BLD: 0 % (ref 0–5)
LYMPHOCYTES NFR BLD: 0.78 K/UL (ref 1.5–4)
LYMPHOCYTES RELATIVE PERCENT: 16 % (ref 20–42)
MCH RBC QN AUTO: 34.2 PG (ref 26–35)
MCHC RBC AUTO-ENTMCNC: 31.7 G/DL (ref 32–34.5)
MCV RBC AUTO: 107.9 FL (ref 80–99.9)
MICROORGANISM SPEC CULT: ABNORMAL
MICROORGANISM SPEC CULT: ABNORMAL
MICROORGANISM SPEC CULT: NORMAL
MICROORGANISM SPEC CULT: NORMAL
MONOCYTES NFR BLD: 0.56 K/UL (ref 0.1–0.95)
MONOCYTES NFR BLD: 12 % (ref 2–12)
NEUTROPHILS NFR BLD: 68 % (ref 43–80)
NEUTS SEG NFR BLD: 3.29 K/UL (ref 1.8–7.3)
PLATELET # BLD AUTO: 80 K/UL (ref 130–450)
PLATELET CONFIRMATION: NORMAL
PMV BLD AUTO: 11 FL (ref 7–12)
POTASSIUM SERPL-SCNC: 5.1 MMOL/L (ref 3.5–5)
RBC # BLD AUTO: 2.66 M/UL (ref 3.5–5.5)
RBC # BLD: ABNORMAL 10*6/UL
SERVICE CMNT-IMP: ABNORMAL
SERVICE CMNT-IMP: NORMAL
SERVICE CMNT-IMP: NORMAL
SODIUM SERPL-SCNC: 142 MMOL/L (ref 132–146)
SPECIMEN DESCRIPTION: ABNORMAL
SPECIMEN DESCRIPTION: NORMAL
SPECIMEN DESCRIPTION: NORMAL
WBC OTHER # BLD: 4.9 K/UL (ref 4.5–11.5)

## 2024-10-09 PROCEDURE — 2580000003 HC RX 258

## 2024-10-09 PROCEDURE — 6360000002 HC RX W HCPCS

## 2024-10-09 PROCEDURE — 99231 SBSQ HOSP IP/OBS SF/LOW 25: CPT

## 2024-10-09 PROCEDURE — 72141 MRI NECK SPINE W/O DYE: CPT

## 2024-10-09 PROCEDURE — 2700000000 HC OXYGEN THERAPY PER DAY

## 2024-10-09 PROCEDURE — 92611 MOTION FLUOROSCOPY/SWALLOW: CPT

## 2024-10-09 PROCEDURE — 6360000002 HC RX W HCPCS: Performed by: PSYCHIATRY & NEUROLOGY

## 2024-10-09 PROCEDURE — 6370000000 HC RX 637 (ALT 250 FOR IP): Performed by: INTERNAL MEDICINE

## 2024-10-09 PROCEDURE — 92526 ORAL FUNCTION THERAPY: CPT

## 2024-10-09 PROCEDURE — 80048 BASIC METABOLIC PNL TOTAL CA: CPT

## 2024-10-09 PROCEDURE — 2500000003 HC RX 250 WO HCPCS: Performed by: INTERNAL MEDICINE

## 2024-10-09 PROCEDURE — 74230 X-RAY XM SWLNG FUNCJ C+: CPT

## 2024-10-09 PROCEDURE — 85025 COMPLETE CBC W/AUTO DIFF WBC: CPT

## 2024-10-09 PROCEDURE — 70551 MRI BRAIN STEM W/O DYE: CPT

## 2024-10-09 PROCEDURE — 6370000000 HC RX 637 (ALT 250 FOR IP)

## 2024-10-09 PROCEDURE — 6360000002 HC RX W HCPCS: Performed by: INTERNAL MEDICINE

## 2024-10-09 PROCEDURE — 82962 GLUCOSE BLOOD TEST: CPT

## 2024-10-09 PROCEDURE — 36415 COLL VENOUS BLD VENIPUNCTURE: CPT

## 2024-10-09 PROCEDURE — 2060000000 HC ICU INTERMEDIATE R&B

## 2024-10-09 PROCEDURE — 71045 X-RAY EXAM CHEST 1 VIEW: CPT

## 2024-10-09 PROCEDURE — 2580000003 HC RX 258: Performed by: INTERNAL MEDICINE

## 2024-10-09 RX ORDER — LACTULOSE 10 G/15ML
20 SOLUTION ORAL 3 TIMES DAILY
Status: DISCONTINUED | OUTPATIENT
Start: 2024-10-09 | End: 2024-10-17 | Stop reason: HOSPADM

## 2024-10-09 RX ORDER — LORAZEPAM 2 MG/ML
1 INJECTION INTRAMUSCULAR ONCE
Status: COMPLETED | OUTPATIENT
Start: 2024-10-09 | End: 2024-10-09

## 2024-10-09 RX ADMIN — MICONAZOLE NITRATE: 20 OINTMENT TOPICAL at 22:25

## 2024-10-09 RX ADMIN — OXYCODONE HYDROCHLORIDE 5 MG: 5 TABLET ORAL at 11:19

## 2024-10-09 RX ADMIN — COLLAGENASE SANTYL: 250 OINTMENT TOPICAL at 10:04

## 2024-10-09 RX ADMIN — MEROPENEM 1000 MG: 1 INJECTION INTRAVENOUS at 15:14

## 2024-10-09 RX ADMIN — ONDANSETRON 4 MG: 2 INJECTION INTRAMUSCULAR; INTRAVENOUS at 11:45

## 2024-10-09 RX ADMIN — Medication 500 MG: at 10:04

## 2024-10-09 RX ADMIN — OXYCODONE HYDROCHLORIDE 5 MG: 5 TABLET ORAL at 06:34

## 2024-10-09 RX ADMIN — ANTI-FUNGAL POWDER MICONAZOLE NITRATE TALC FREE: 1.42 POWDER TOPICAL at 22:25

## 2024-10-09 RX ADMIN — LEVETIRACETAM 500 MG: 100 INJECTION INTRAVENOUS at 10:04

## 2024-10-09 RX ADMIN — SODIUM CHLORIDE, PRESERVATIVE FREE 10 ML: 5 INJECTION INTRAVENOUS at 10:21

## 2024-10-09 RX ADMIN — MEROPENEM 1000 MG: 1 INJECTION INTRAVENOUS at 05:57

## 2024-10-09 RX ADMIN — LORAZEPAM 1 MG: 2 INJECTION INTRAMUSCULAR; INTRAVENOUS at 19:23

## 2024-10-09 RX ADMIN — SODIUM CHLORIDE, PRESERVATIVE FREE 10 ML: 5 INJECTION INTRAVENOUS at 22:13

## 2024-10-09 RX ADMIN — OXYCODONE HYDROCHLORIDE 5 MG: 5 TABLET ORAL at 17:51

## 2024-10-09 RX ADMIN — MEROPENEM 1000 MG: 1 INJECTION INTRAVENOUS at 22:24

## 2024-10-09 RX ADMIN — MICONAZOLE NITRATE: 20 OINTMENT TOPICAL at 10:03

## 2024-10-09 RX ADMIN — LACTULOSE 20 G: 10 SOLUTION ORAL at 22:13

## 2024-10-09 RX ADMIN — RIFAXIMIN 400 MG: 200 TABLET ORAL at 22:13

## 2024-10-09 RX ADMIN — Medication 3 MG: at 22:13

## 2024-10-09 RX ADMIN — LEVETIRACETAM 500 MG: 100 INJECTION INTRAVENOUS at 22:13

## 2024-10-09 RX ADMIN — ANTI-FUNGAL POWDER MICONAZOLE NITRATE TALC FREE: 1.42 POWDER TOPICAL at 10:03

## 2024-10-09 ASSESSMENT — PAIN DESCRIPTION - DESCRIPTORS
DESCRIPTORS: PATIENT UNABLE TO DESCRIBE
DESCRIPTORS: PATIENT UNABLE TO DESCRIBE

## 2024-10-09 ASSESSMENT — PAIN - FUNCTIONAL ASSESSMENT
PAIN_FUNCTIONAL_ASSESSMENT: PREVENTS OR INTERFERES SOME ACTIVE ACTIVITIES AND ADLS
PAIN_FUNCTIONAL_ASSESSMENT: PREVENTS OR INTERFERES SOME ACTIVE ACTIVITIES AND ADLS

## 2024-10-09 ASSESSMENT — PAIN DESCRIPTION - LOCATION
LOCATION: KNEE
LOCATION: KNEE

## 2024-10-09 ASSESSMENT — PAIN SCALES - GENERAL
PAINLEVEL_OUTOF10: 6
PAINLEVEL_OUTOF10: 10
PAINLEVEL_OUTOF10: 7
PAINLEVEL_OUTOF10: 10
PAINLEVEL_OUTOF10: 6

## 2024-10-09 ASSESSMENT — PAIN DESCRIPTION - ORIENTATION
ORIENTATION: RIGHT;LEFT
ORIENTATION: LEFT;RIGHT

## 2024-10-09 NOTE — PLAN OF CARE
Problem: Discharge Planning  Goal: Discharge to home or other facility with appropriate resources  Outcome: Progressing     Problem: Skin/Tissue Integrity  Goal: Absence of new skin breakdown  Description: 1.  Monitor for areas of redness and/or skin breakdown  2.  Assess vascular access sites hourly  3.  Every 4-6 hours minimum:  Change oxygen saturation probe site  4.  Every 4-6 hours:  If on nasal continuous positive airway pressure, respiratory therapy assess nares and determine need for appliance change or resting period.  Outcome: Progressing     Problem: Safety - Adult  Goal: Free from fall injury  Outcome: Progressing     Problem: Pain  Goal: Verbalizes/displays adequate comfort level or baseline comfort level  Outcome: Progressing     Problem: Nutrition Deficit:  Goal: Optimize nutritional status  Outcome: Progressing     Problem: ABCDS Injury Assessment  Goal: Absence of physical injury  Outcome: Progressing     Problem: Chronic Conditions and Co-morbidities  Goal: Patient's chronic conditions and co-morbidity symptoms are monitored and maintained or improved  Outcome: Progressing

## 2024-10-09 NOTE — PLAN OF CARE
Problem: Discharge Planning  Goal: Discharge to home or other facility with appropriate resources  Outcome: Progressing     Problem: Skin/Tissue Integrity  Goal: Absence of new skin breakdown  Description: 1.  Monitor for areas of redness and/or skin breakdown  2.  Assess vascular access sites hourly  3.  Every 4-6 hours minimum:  Change oxygen saturation probe site  4.  Every 4-6 hours:  If on nasal continuous positive airway pressure, respiratory therapy assess nares and determine need for appliance change or resting period.  Outcome: Progressing     Problem: Pain  Goal: Verbalizes/displays adequate comfort level or baseline comfort level  Outcome: Progressing     Problem: Nutrition Deficit:  Goal: Optimize nutritional status  Outcome: Progressing     Problem: Chronic Conditions and Co-morbidities  Goal: Patient's chronic conditions and co-morbidity symptoms are monitored and maintained or improved  Outcome: Progressing

## 2024-10-10 ENCOUNTER — APPOINTMENT (OUTPATIENT)
Dept: MRI IMAGING | Age: 59
DRG: 698 | End: 2024-10-10
Attending: INTERNAL MEDICINE
Payer: COMMERCIAL

## 2024-10-10 LAB
ANION GAP SERPL CALCULATED.3IONS-SCNC: 5 MMOL/L (ref 7–16)
BASOPHILS # BLD: 0.02 K/UL (ref 0–0.2)
BASOPHILS NFR BLD: 0 % (ref 0–2)
BUN SERPL-MCNC: 21 MG/DL (ref 6–23)
CALCIUM SERPL-MCNC: 9.2 MG/DL (ref 8.6–10.2)
CHLORIDE SERPL-SCNC: 106 MMOL/L (ref 98–107)
CO2 SERPL-SCNC: 25 MMOL/L (ref 22–29)
CREAT SERPL-MCNC: 0.7 MG/DL (ref 0.5–1)
EOSINOPHIL # BLD: 0.19 K/UL (ref 0.05–0.5)
EOSINOPHILS RELATIVE PERCENT: 4 % (ref 0–6)
ERYTHROCYTE [DISTWIDTH] IN BLOOD BY AUTOMATED COUNT: 19.4 % (ref 11.5–15)
GFR, ESTIMATED: >90 ML/MIN/1.73M2
GLUCOSE BLD-MCNC: 148 MG/DL (ref 74–99)
GLUCOSE BLD-MCNC: 150 MG/DL (ref 74–99)
GLUCOSE BLD-MCNC: 150 MG/DL (ref 74–99)
GLUCOSE BLD-MCNC: 171 MG/DL (ref 74–99)
GLUCOSE SERPL-MCNC: 168 MG/DL (ref 74–99)
HCT VFR BLD AUTO: 28.2 % (ref 34–48)
HGB BLD-MCNC: 9 G/DL (ref 11.5–15.5)
IMM GRANULOCYTES # BLD AUTO: <0.03 K/UL (ref 0–0.58)
IMM GRANULOCYTES NFR BLD: 0 % (ref 0–5)
LYMPHOCYTES NFR BLD: 0.78 K/UL (ref 1.5–4)
LYMPHOCYTES RELATIVE PERCENT: 16 % (ref 20–42)
MCH RBC QN AUTO: 35.2 PG (ref 26–35)
MCHC RBC AUTO-ENTMCNC: 31.9 G/DL (ref 32–34.5)
MCV RBC AUTO: 110.2 FL (ref 80–99.9)
MICROORGANISM SPEC CULT: NORMAL
MICROORGANISM SPEC CULT: NORMAL
MONOCYTES NFR BLD: 0.51 K/UL (ref 0.1–0.95)
MONOCYTES NFR BLD: 11 % (ref 2–12)
NEUTROPHILS NFR BLD: 69 % (ref 43–80)
NEUTS SEG NFR BLD: 3.31 K/UL (ref 1.8–7.3)
PLATELET # BLD AUTO: 101 K/UL (ref 130–450)
PMV BLD AUTO: 10.8 FL (ref 7–12)
POTASSIUM SERPL-SCNC: 5 MMOL/L (ref 3.5–5)
RBC # BLD AUTO: 2.56 M/UL (ref 3.5–5.5)
RBC # BLD: ABNORMAL 10*6/UL
SERVICE CMNT-IMP: NORMAL
SERVICE CMNT-IMP: NORMAL
SODIUM SERPL-SCNC: 136 MMOL/L (ref 132–146)
SPECIMEN DESCRIPTION: NORMAL
SPECIMEN DESCRIPTION: NORMAL
WBC OTHER # BLD: 4.8 K/UL (ref 4.5–11.5)

## 2024-10-10 PROCEDURE — 2580000003 HC RX 258

## 2024-10-10 PROCEDURE — 6370000000 HC RX 637 (ALT 250 FOR IP): Performed by: INTERNAL MEDICINE

## 2024-10-10 PROCEDURE — 99232 SBSQ HOSP IP/OBS MODERATE 35: CPT | Performed by: NURSE PRACTITIONER

## 2024-10-10 PROCEDURE — 6370000000 HC RX 637 (ALT 250 FOR IP)

## 2024-10-10 PROCEDURE — 6360000002 HC RX W HCPCS: Performed by: INTERNAL MEDICINE

## 2024-10-10 PROCEDURE — 36415 COLL VENOUS BLD VENIPUNCTURE: CPT

## 2024-10-10 PROCEDURE — 97110 THERAPEUTIC EXERCISES: CPT

## 2024-10-10 PROCEDURE — 97530 THERAPEUTIC ACTIVITIES: CPT

## 2024-10-10 PROCEDURE — 80048 BASIC METABOLIC PNL TOTAL CA: CPT

## 2024-10-10 PROCEDURE — 2060000000 HC ICU INTERMEDIATE R&B

## 2024-10-10 PROCEDURE — 2580000003 HC RX 258: Performed by: INTERNAL MEDICINE

## 2024-10-10 PROCEDURE — 99232 SBSQ HOSP IP/OBS MODERATE 35: CPT | Performed by: INTERNAL MEDICINE

## 2024-10-10 PROCEDURE — 2700000000 HC OXYGEN THERAPY PER DAY

## 2024-10-10 PROCEDURE — 6360000002 HC RX W HCPCS

## 2024-10-10 PROCEDURE — 82962 GLUCOSE BLOOD TEST: CPT

## 2024-10-10 PROCEDURE — 85025 COMPLETE CBC W/AUTO DIFF WBC: CPT

## 2024-10-10 RX ORDER — SODIUM CHLORIDE 0.9 % (FLUSH) 0.9 %
5-40 SYRINGE (ML) INJECTION PRN
Status: DISCONTINUED | OUTPATIENT
Start: 2024-10-10 | End: 2024-10-17 | Stop reason: HOSPADM

## 2024-10-10 RX ORDER — SODIUM CHLORIDE 0.9 % (FLUSH) 0.9 %
5-40 SYRINGE (ML) INJECTION EVERY 12 HOURS SCHEDULED
Status: DISCONTINUED | OUTPATIENT
Start: 2024-10-10 | End: 2024-10-17 | Stop reason: HOSPADM

## 2024-10-10 RX ORDER — LIDOCAINE HYDROCHLORIDE 10 MG/ML
50 INJECTION, SOLUTION EPIDURAL; INFILTRATION; INTRACAUDAL; PERINEURAL ONCE
Status: DISCONTINUED | OUTPATIENT
Start: 2024-10-10 | End: 2024-10-17 | Stop reason: HOSPADM

## 2024-10-10 RX ORDER — SODIUM CHLORIDE 9 MG/ML
INJECTION, SOLUTION INTRAVENOUS PRN
Status: DISCONTINUED | OUTPATIENT
Start: 2024-10-10 | End: 2024-10-17 | Stop reason: HOSPADM

## 2024-10-10 RX ORDER — LORAZEPAM 2 MG/ML
2 INJECTION INTRAMUSCULAR ONCE
Status: COMPLETED | OUTPATIENT
Start: 2024-10-10 | End: 2024-10-10

## 2024-10-10 RX ADMIN — RIFAXIMIN 400 MG: 200 TABLET ORAL at 09:48

## 2024-10-10 RX ADMIN — MEROPENEM 1000 MG: 1 INJECTION INTRAVENOUS at 22:06

## 2024-10-10 RX ADMIN — LEVETIRACETAM 500 MG: 100 INJECTION INTRAVENOUS at 09:50

## 2024-10-10 RX ADMIN — LACTULOSE 20 G: 10 SOLUTION ORAL at 13:43

## 2024-10-10 RX ADMIN — SODIUM CHLORIDE, PRESERVATIVE FREE 10 ML: 5 INJECTION INTRAVENOUS at 21:06

## 2024-10-10 RX ADMIN — LACTULOSE 20 G: 10 SOLUTION ORAL at 09:46

## 2024-10-10 RX ADMIN — SODIUM CHLORIDE, PRESERVATIVE FREE 10 ML: 5 INJECTION INTRAVENOUS at 09:54

## 2024-10-10 RX ADMIN — MEROPENEM 1000 MG: 1 INJECTION INTRAVENOUS at 06:10

## 2024-10-10 RX ADMIN — Medication 500 MG: at 09:47

## 2024-10-10 RX ADMIN — MEROPENEM 1000 MG: 1 INJECTION INTRAVENOUS at 14:37

## 2024-10-10 RX ADMIN — COLLAGENASE SANTYL: 250 OINTMENT TOPICAL at 09:00

## 2024-10-10 RX ADMIN — LACTULOSE 20 G: 10 SOLUTION ORAL at 22:32

## 2024-10-10 RX ADMIN — RIFAXIMIN 400 MG: 200 TABLET ORAL at 13:46

## 2024-10-10 RX ADMIN — MICONAZOLE NITRATE: 20 OINTMENT TOPICAL at 09:00

## 2024-10-10 RX ADMIN — ANTI-FUNGAL POWDER MICONAZOLE NITRATE TALC FREE: 1.42 POWDER TOPICAL at 09:00

## 2024-10-10 RX ADMIN — LORAZEPAM 2 MG: 2 INJECTION INTRAMUSCULAR; INTRAVENOUS at 18:38

## 2024-10-10 RX ADMIN — OXYCODONE HYDROCHLORIDE 5 MG: 5 TABLET ORAL at 13:42

## 2024-10-10 RX ADMIN — Medication 3 MG: at 20:59

## 2024-10-10 RX ADMIN — ANTI-FUNGAL POWDER MICONAZOLE NITRATE TALC FREE: 1.42 POWDER TOPICAL at 21:19

## 2024-10-10 RX ADMIN — RIFAXIMIN 400 MG: 200 TABLET ORAL at 20:59

## 2024-10-10 RX ADMIN — OXYCODONE HYDROCHLORIDE 5 MG: 5 TABLET ORAL at 20:59

## 2024-10-10 RX ADMIN — LEVETIRACETAM 500 MG: 100 INJECTION INTRAVENOUS at 21:06

## 2024-10-10 ASSESSMENT — PAIN SCALES - WONG BAKER: WONGBAKER_NUMERICALRESPONSE: NO HURT

## 2024-10-10 ASSESSMENT — PAIN DESCRIPTION - ORIENTATION
ORIENTATION: LEFT;RIGHT
ORIENTATION: RIGHT;LEFT

## 2024-10-10 ASSESSMENT — PAIN SCALES - GENERAL
PAINLEVEL_OUTOF10: 10
PAINLEVEL_OUTOF10: 0
PAINLEVEL_OUTOF10: 0

## 2024-10-10 ASSESSMENT — PAIN DESCRIPTION - LOCATION
LOCATION: LEG
LOCATION: LEG

## 2024-10-10 NOTE — PLAN OF CARE
Problem: Discharge Planning  Goal: Discharge to home or other facility with appropriate resources  10/9/2024 2335 by Ruby Cline, RN  Outcome: Progressing     Problem: Skin/Tissue Integrity  Goal: Absence of new skin breakdown  Description: 1.  Monitor for areas of redness and/or skin breakdown  2.  Assess vascular access sites hourly  3.  Every 4-6 hours minimum:  Change oxygen saturation probe site  4.  Every 4-6 hours:  If on nasal continuous positive airway pressure, respiratory therapy assess nares and determine need for appliance change or resting period.  10/9/2024 2335 by Ruby Cline, RN  Outcome: Progressing     Problem: Safety - Adult  Goal: Free from fall injury  10/9/2024 2335 by Ruby Cline RN  Outcome: Progressing     Problem: Pain  Goal: Verbalizes/displays adequate comfort level or baseline comfort level  10/9/2024 2335 by Ruby Cline, RN  Outcome: Progressing     Problem: Nutrition Deficit:  Goal: Optimize nutritional status  10/9/2024 2335 by Ruby Cline, RN  Outcome: Progressing     Problem: ABCDS Injury Assessment  Goal: Absence of physical injury  10/9/2024 2335 by Ruby Cline, RN  Outcome: Progressing     Problem: Chronic Conditions and Co-morbidities  Goal: Patient's chronic conditions and co-morbidity symptoms are monitored and maintained or improved  10/9/2024 2335 by Ruby Cline, RN  Outcome: Progressing

## 2024-10-10 NOTE — PLAN OF CARE
Problem: Discharge Planning  Goal: Discharge to home or other facility with appropriate resources  10/10/2024 1129 by Loraine Dailey RN  Outcome: Progressing  10/9/2024 2335 by Ruby Cline RN  Outcome: Progressing     Problem: Skin/Tissue Integrity  Goal: Absence of new skin breakdown  Description: 1.  Monitor for areas of redness and/or skin breakdown  2.  Assess vascular access sites hourly  3.  Every 4-6 hours minimum:  Change oxygen saturation probe site  4.  Every 4-6 hours:  If on nasal continuous positive airway pressure, respiratory therapy assess nares and determine need for appliance change or resting period.  10/10/2024 1129 by Loraine Dailey RN  Outcome: Progressing  10/9/2024 2335 by Ruby Cline RN  Outcome: Progressing     Problem: Safety - Adult  Goal: Free from fall injury  10/10/2024 1129 by Loraine Dailey RN  Outcome: Progressing  10/9/2024 2335 by Ruby Cline RN  Outcome: Progressing     Problem: Pain  Goal: Verbalizes/displays adequate comfort level or baseline comfort level  10/10/2024 1129 by Loraine Dailey RN  Outcome: Progressing  10/9/2024 2335 by Ruby Cline RN  Outcome: Progressing     Problem: Nutrition Deficit:  Goal: Optimize nutritional status  10/10/2024 1129 by Loraine Dailey RN  Outcome: Progressing  10/9/2024 2335 by Ruby Cline RN  Outcome: Progressing     Problem: ABCDS Injury Assessment  Goal: Absence of physical injury  10/10/2024 1129 by Loraine Dailey RN  Outcome: Progressing  10/9/2024 2335 by Ruby Cline RN  Outcome: Progressing     Problem: Chronic Conditions and Co-morbidities  Goal: Patient's chronic conditions and co-morbidity symptoms are monitored and maintained or improved  10/10/2024 1129 by Loraine Dailey RN  Outcome: Progressing  10/9/2024 2335 by Ruby Cline RN  Outcome: Progressing

## 2024-10-11 LAB
GLUCOSE BLD-MCNC: 127 MG/DL (ref 74–99)
GLUCOSE BLD-MCNC: 131 MG/DL (ref 74–99)
GLUCOSE BLD-MCNC: 133 MG/DL (ref 74–99)
GLUCOSE BLD-MCNC: 163 MG/DL (ref 74–99)
VIT C SERPL-MCNC: 73 UMOL/L (ref 23–114)

## 2024-10-11 PROCEDURE — 76937 US GUIDE VASCULAR ACCESS: CPT

## 2024-10-11 PROCEDURE — 6370000000 HC RX 637 (ALT 250 FOR IP)

## 2024-10-11 PROCEDURE — C1751 CATH, INF, PER/CENT/MIDLINE: HCPCS

## 2024-10-11 PROCEDURE — 92526 ORAL FUNCTION THERAPY: CPT

## 2024-10-11 PROCEDURE — 6370000000 HC RX 637 (ALT 250 FOR IP): Performed by: INTERNAL MEDICINE

## 2024-10-11 PROCEDURE — 51702 INSERT TEMP BLADDER CATH: CPT

## 2024-10-11 PROCEDURE — 82962 GLUCOSE BLOOD TEST: CPT

## 2024-10-11 PROCEDURE — 2700000000 HC OXYGEN THERAPY PER DAY

## 2024-10-11 PROCEDURE — 99232 SBSQ HOSP IP/OBS MODERATE 35: CPT | Performed by: INTERNAL MEDICINE

## 2024-10-11 PROCEDURE — 6360000002 HC RX W HCPCS: Performed by: INTERNAL MEDICINE

## 2024-10-11 PROCEDURE — 05H933Z INSERTION OF INFUSION DEVICE INTO RIGHT BRACHIAL VEIN, PERCUTANEOUS APPROACH: ICD-10-PCS | Performed by: INTERNAL MEDICINE

## 2024-10-11 PROCEDURE — 2580000003 HC RX 258: Performed by: INTERNAL MEDICINE

## 2024-10-11 PROCEDURE — 6360000002 HC RX W HCPCS

## 2024-10-11 PROCEDURE — 2060000000 HC ICU INTERMEDIATE R&B

## 2024-10-11 PROCEDURE — 36569 INSJ PICC 5 YR+ W/O IMAGING: CPT

## 2024-10-11 RX ORDER — OXYCODONE HYDROCHLORIDE 5 MG/1
5 TABLET ORAL EVERY 4 HOURS PRN
Qty: 12 TABLET | Refills: 0 | Status: SHIPPED | OUTPATIENT
Start: 2024-10-11 | End: 2024-10-14

## 2024-10-11 RX ORDER — LACTULOSE 10 G/15ML
20 SOLUTION ORAL 3 TIMES DAILY
Qty: 2700 ML | Refills: 0 | Status: SHIPPED | OUTPATIENT
Start: 2024-10-11 | End: 2024-11-10

## 2024-10-11 RX ORDER — ALLOPURINOL 100 MG/1
100 TABLET ORAL 2 TIMES DAILY
Status: DISCONTINUED | OUTPATIENT
Start: 2024-10-11 | End: 2024-10-17 | Stop reason: HOSPADM

## 2024-10-11 RX ORDER — INSULIN LISPRO 100 [IU]/ML
0-8 INJECTION, SOLUTION INTRAVENOUS; SUBCUTANEOUS
Qty: 10 ML | Refills: 0 | Status: SHIPPED | OUTPATIENT
Start: 2024-10-11

## 2024-10-11 RX ORDER — INSULIN LISPRO 100 [IU]/ML
0-4 INJECTION, SOLUTION INTRAVENOUS; SUBCUTANEOUS NIGHTLY
Qty: 10 ML | Refills: 0 | Status: SHIPPED | OUTPATIENT
Start: 2024-10-11 | End: 2024-11-10

## 2024-10-11 RX ORDER — LEVETIRACETAM 500 MG/1
500 TABLET ORAL 2 TIMES DAILY
Qty: 60 TABLET | Refills: 3 | Status: SHIPPED | OUTPATIENT
Start: 2024-10-11

## 2024-10-11 RX ADMIN — COLLAGENASE SANTYL: 250 OINTMENT TOPICAL at 11:12

## 2024-10-11 RX ADMIN — OXYCODONE HYDROCHLORIDE 5 MG: 5 TABLET ORAL at 08:52

## 2024-10-11 RX ADMIN — MEROPENEM 1000 MG: 1 INJECTION INTRAVENOUS at 14:12

## 2024-10-11 RX ADMIN — LACTULOSE 20 G: 10 SOLUTION ORAL at 08:56

## 2024-10-11 RX ADMIN — OXYCODONE HYDROCHLORIDE 5 MG: 5 TABLET ORAL at 18:58

## 2024-10-11 RX ADMIN — Medication 3 MG: at 21:33

## 2024-10-11 RX ADMIN — ACETAMINOPHEN 650 MG: 325 TABLET ORAL at 21:33

## 2024-10-11 RX ADMIN — MICONAZOLE NITRATE: 20 OINTMENT TOPICAL at 11:13

## 2024-10-11 RX ADMIN — MEROPENEM 1000 MG: 1 INJECTION INTRAVENOUS at 21:45

## 2024-10-11 RX ADMIN — MEROPENEM 1000 MG: 1 INJECTION INTRAVENOUS at 05:25

## 2024-10-11 RX ADMIN — LACTULOSE 20 G: 10 SOLUTION ORAL at 14:13

## 2024-10-11 RX ADMIN — ALLOPURINOL 100 MG: 100 TABLET ORAL at 21:33

## 2024-10-11 RX ADMIN — RIFAXIMIN 400 MG: 200 TABLET ORAL at 08:51

## 2024-10-11 RX ADMIN — RIFAXIMIN 400 MG: 200 TABLET ORAL at 21:33

## 2024-10-11 RX ADMIN — SODIUM CHLORIDE, PRESERVATIVE FREE 10 ML: 5 INJECTION INTRAVENOUS at 08:54

## 2024-10-11 RX ADMIN — LEVETIRACETAM 500 MG: 100 INJECTION INTRAVENOUS at 08:52

## 2024-10-11 RX ADMIN — LEVETIRACETAM 500 MG: 100 INJECTION INTRAVENOUS at 21:34

## 2024-10-11 RX ADMIN — OXYCODONE HYDROCHLORIDE 5 MG: 5 TABLET ORAL at 14:09

## 2024-10-11 RX ADMIN — RIFAXIMIN 400 MG: 200 TABLET ORAL at 14:50

## 2024-10-11 RX ADMIN — ANTI-FUNGAL POWDER MICONAZOLE NITRATE TALC FREE: 1.42 POWDER TOPICAL at 11:12

## 2024-10-11 RX ADMIN — Medication 500 MG: at 08:52

## 2024-10-11 ASSESSMENT — PAIN SCALES - GENERAL
PAINLEVEL_OUTOF10: 9
PAINLEVEL_OUTOF10: 9
PAINLEVEL_OUTOF10: 0
PAINLEVEL_OUTOF10: 0
PAINLEVEL_OUTOF10: 9
PAINLEVEL_OUTOF10: 0
PAINLEVEL_OUTOF10: 0

## 2024-10-11 ASSESSMENT — PAIN DESCRIPTION - LOCATION
LOCATION: KNEE
LOCATION: GENERALIZED
LOCATION: BACK;KNEE
LOCATION: KNEE
LOCATION: GENERALIZED

## 2024-10-11 ASSESSMENT — PAIN SCALES - WONG BAKER
WONGBAKER_NUMERICALRESPONSE: NO HURT
WONGBAKER_NUMERICALRESPONSE: HURTS LITTLE MORE
WONGBAKER_NUMERICALRESPONSE: NO HURT
WONGBAKER_NUMERICALRESPONSE: NO HURT

## 2024-10-11 ASSESSMENT — PAIN DESCRIPTION - DESCRIPTORS
DESCRIPTORS: ACHING;THROBBING;DISCOMFORT

## 2024-10-11 ASSESSMENT — PAIN DESCRIPTION - ORIENTATION
ORIENTATION: LOWER
ORIENTATION: RIGHT

## 2024-10-12 LAB
ERYTHROCYTE [DISTWIDTH] IN BLOOD BY AUTOMATED COUNT: 19 % (ref 11.5–15)
GLUCOSE BLD-MCNC: 134 MG/DL (ref 74–99)
GLUCOSE BLD-MCNC: 148 MG/DL (ref 74–99)
GLUCOSE BLD-MCNC: 165 MG/DL (ref 74–99)
GLUCOSE BLD-MCNC: 169 MG/DL (ref 74–99)
HCT VFR BLD AUTO: 27.5 % (ref 34–48)
HGB BLD-MCNC: 8.5 G/DL (ref 11.5–15.5)
MCH RBC QN AUTO: 34.1 PG (ref 26–35)
MCHC RBC AUTO-ENTMCNC: 30.9 G/DL (ref 32–34.5)
MCV RBC AUTO: 110.4 FL (ref 80–99.9)
PLATELET # BLD AUTO: 90 K/UL (ref 130–450)
PLATELET CONFIRMATION: NORMAL
PMV BLD AUTO: 10.8 FL (ref 7–12)
RBC # BLD AUTO: 2.49 M/UL (ref 3.5–5.5)
WBC OTHER # BLD: 3.8 K/UL (ref 4.5–11.5)

## 2024-10-12 PROCEDURE — 6370000000 HC RX 637 (ALT 250 FOR IP): Performed by: INTERNAL MEDICINE

## 2024-10-12 PROCEDURE — 82962 GLUCOSE BLOOD TEST: CPT

## 2024-10-12 PROCEDURE — 2580000003 HC RX 258: Performed by: INTERNAL MEDICINE

## 2024-10-12 PROCEDURE — 6360000002 HC RX W HCPCS

## 2024-10-12 PROCEDURE — 99232 SBSQ HOSP IP/OBS MODERATE 35: CPT | Performed by: INTERNAL MEDICINE

## 2024-10-12 PROCEDURE — 6370000000 HC RX 637 (ALT 250 FOR IP)

## 2024-10-12 PROCEDURE — 85027 COMPLETE CBC AUTOMATED: CPT

## 2024-10-12 PROCEDURE — 2700000000 HC OXYGEN THERAPY PER DAY

## 2024-10-12 PROCEDURE — 99232 SBSQ HOSP IP/OBS MODERATE 35: CPT

## 2024-10-12 PROCEDURE — 6360000002 HC RX W HCPCS: Performed by: INTERNAL MEDICINE

## 2024-10-12 PROCEDURE — 2060000000 HC ICU INTERMEDIATE R&B

## 2024-10-12 RX ORDER — HYDROXYZINE PAMOATE 25 MG/1
50 CAPSULE ORAL 3 TIMES DAILY PRN
Status: DISCONTINUED | OUTPATIENT
Start: 2024-10-12 | End: 2024-10-17 | Stop reason: HOSPADM

## 2024-10-12 RX ORDER — HYDROCODONE BITARTRATE AND ACETAMINOPHEN 5; 325 MG/1; MG/1
1 TABLET ORAL EVERY 4 HOURS PRN
Status: DISCONTINUED | OUTPATIENT
Start: 2024-10-12 | End: 2024-10-17 | Stop reason: HOSPADM

## 2024-10-12 RX ADMIN — HYDROCODONE BITARTRATE AND ACETAMINOPHEN 1 TABLET: 5; 325 TABLET ORAL at 19:16

## 2024-10-12 RX ADMIN — MICONAZOLE NITRATE: 20 OINTMENT TOPICAL at 00:10

## 2024-10-12 RX ADMIN — LACTULOSE 20 G: 10 SOLUTION ORAL at 09:11

## 2024-10-12 RX ADMIN — LEVETIRACETAM 500 MG: 100 INJECTION INTRAVENOUS at 09:07

## 2024-10-12 RX ADMIN — HYDROCODONE BITARTRATE AND ACETAMINOPHEN 1 TABLET: 5; 325 TABLET ORAL at 12:08

## 2024-10-12 RX ADMIN — ALLOPURINOL 100 MG: 100 TABLET ORAL at 21:04

## 2024-10-12 RX ADMIN — LACTULOSE 20 G: 10 SOLUTION ORAL at 14:29

## 2024-10-12 RX ADMIN — ANTI-FUNGAL POWDER MICONAZOLE NITRATE TALC FREE: 1.42 POWDER TOPICAL at 09:13

## 2024-10-12 RX ADMIN — ACETAMINOPHEN 650 MG: 325 TABLET ORAL at 21:02

## 2024-10-12 RX ADMIN — SODIUM CHLORIDE, PRESERVATIVE FREE 10 ML: 5 INJECTION INTRAVENOUS at 00:10

## 2024-10-12 RX ADMIN — Medication 3 MG: at 21:02

## 2024-10-12 RX ADMIN — LACTULOSE 20 G: 10 SOLUTION ORAL at 21:05

## 2024-10-12 RX ADMIN — RIFAXIMIN 400 MG: 200 TABLET ORAL at 09:11

## 2024-10-12 RX ADMIN — ALLOPURINOL 100 MG: 100 TABLET ORAL at 09:11

## 2024-10-12 RX ADMIN — ACETAMINOPHEN 650 MG: 325 TABLET ORAL at 04:07

## 2024-10-12 RX ADMIN — RIFAXIMIN 400 MG: 200 TABLET ORAL at 14:30

## 2024-10-12 RX ADMIN — MEROPENEM 1000 MG: 1 INJECTION INTRAVENOUS at 06:08

## 2024-10-12 RX ADMIN — OXYCODONE HYDROCHLORIDE 5 MG: 5 TABLET ORAL at 23:25

## 2024-10-12 RX ADMIN — OXYCODONE HYDROCHLORIDE 5 MG: 5 TABLET ORAL at 02:24

## 2024-10-12 RX ADMIN — LEVETIRACETAM 500 MG: 100 INJECTION INTRAVENOUS at 21:06

## 2024-10-12 RX ADMIN — HYDROXYZINE PAMOATE 50 MG: 25 CAPSULE ORAL at 21:02

## 2024-10-12 RX ADMIN — RIFAXIMIN 400 MG: 200 TABLET ORAL at 21:02

## 2024-10-12 RX ADMIN — SODIUM CHLORIDE, PRESERVATIVE FREE 10 ML: 5 INJECTION INTRAVENOUS at 09:09

## 2024-10-12 RX ADMIN — OXYCODONE HYDROCHLORIDE 5 MG: 5 TABLET ORAL at 10:11

## 2024-10-12 RX ADMIN — SODIUM CHLORIDE, PRESERVATIVE FREE 10 ML: 5 INJECTION INTRAVENOUS at 21:06

## 2024-10-12 RX ADMIN — COLLAGENASE SANTYL: 250 OINTMENT TOPICAL at 12:08

## 2024-10-12 RX ADMIN — ANTI-FUNGAL POWDER MICONAZOLE NITRATE TALC FREE: 1.42 POWDER TOPICAL at 00:10

## 2024-10-12 RX ADMIN — MEROPENEM 1000 MG: 1 INJECTION INTRAVENOUS at 14:29

## 2024-10-12 RX ADMIN — MEROPENEM 1000 MG: 1 INJECTION INTRAVENOUS at 21:24

## 2024-10-12 RX ADMIN — OXYCODONE HYDROCHLORIDE 5 MG: 5 TABLET ORAL at 06:03

## 2024-10-12 RX ADMIN — Medication 500 MG: at 09:11

## 2024-10-12 ASSESSMENT — PAIN SCALES - GENERAL
PAINLEVEL_OUTOF10: 10
PAINLEVEL_OUTOF10: 0
PAINLEVEL_OUTOF10: 0
PAINLEVEL_OUTOF10: 10
PAINLEVEL_OUTOF10: 4
PAINLEVEL_OUTOF10: 4
PAINLEVEL_OUTOF10: 7
PAINLEVEL_OUTOF10: 3
PAINLEVEL_OUTOF10: 0
PAINLEVEL_OUTOF10: 10
PAINLEVEL_OUTOF10: 9
PAINLEVEL_OUTOF10: 1
PAINLEVEL_OUTOF10: 0
PAINLEVEL_OUTOF10: 4
PAINLEVEL_OUTOF10: 9
PAINLEVEL_OUTOF10: 8

## 2024-10-12 ASSESSMENT — PAIN DESCRIPTION - LOCATION
LOCATION: BUTTOCKS;LEG
LOCATION: BUTTOCKS
LOCATION: COCCYX;LEG
LOCATION: LEG
LOCATION: LEG
LOCATION: BUTTOCKS

## 2024-10-12 ASSESSMENT — PAIN SCALES - WONG BAKER
WONGBAKER_NUMERICALRESPONSE: NO HURT
WONGBAKER_NUMERICALRESPONSE: NO HURT
WONGBAKER_NUMERICALRESPONSE: HURTS EVEN MORE
WONGBAKER_NUMERICALRESPONSE: NO HURT
WONGBAKER_NUMERICALRESPONSE: NO HURT

## 2024-10-12 ASSESSMENT — PAIN DESCRIPTION - ORIENTATION
ORIENTATION: RIGHT;LEFT
ORIENTATION: POSTERIOR
ORIENTATION: RIGHT;LEFT;UPPER;LOWER
ORIENTATION: RIGHT;LOWER;UPPER;LEFT
ORIENTATION: RIGHT;LOWER;UPPER;LEFT

## 2024-10-12 ASSESSMENT — PAIN DESCRIPTION - DESCRIPTORS
DESCRIPTORS: ACHING;THROBBING;DISCOMFORT

## 2024-10-12 NOTE — PLAN OF CARE
Problem: Discharge Planning  Goal: Discharge to home or other facility with appropriate resources  10/12/2024 1007 by Iman Mccollum RN  Outcome: Progressing  10/12/2024 0022 by Dale Freeman RN  Outcome: Progressing     Problem: Skin/Tissue Integrity  Goal: Absence of new skin breakdown  Description: 1.  Monitor for areas of redness and/or skin breakdown  2.  Assess vascular access sites hourly  3.  Every 4-6 hours minimum:  Change oxygen saturation probe site  4.  Every 4-6 hours:  If on nasal continuous positive airway pressure, respiratory therapy assess nares and determine need for appliance change or resting period.  10/12/2024 1007 by Iman Mccollum RN  Outcome: Progressing  10/12/2024 0022 by Dale Freeman RN  Outcome: Progressing     Problem: Safety - Adult  Goal: Free from fall injury  10/12/2024 1007 by Iman Mccollum RN  Outcome: Progressing  10/12/2024 0022 by Dale Freeman RN  Outcome: Progressing     Problem: Pain  Goal: Verbalizes/displays adequate comfort level or baseline comfort level  10/12/2024 1007 by Iman Mccollum RN  Outcome: Progressing  10/12/2024 0022 by Dale Freeman RN  Outcome: Progressing     Problem: Nutrition Deficit:  Goal: Optimize nutritional status  10/12/2024 1007 by Iman Mccollum RN  Outcome: Progressing  10/12/2024 0022 by Dale Freeman RN  Outcome: Progressing     Problem: ABCDS Injury Assessment  Goal: Absence of physical injury  10/12/2024 1007 by Iman Mccollum RN  Outcome: Progressing  10/12/2024 0022 by Dale Freeman RN  Outcome: Progressing     Problem: Chronic Conditions and Co-morbidities  Goal: Patient's chronic conditions and co-morbidity symptoms are monitored and maintained or improved  10/12/2024 1007 by Iman Mccollum RN  Outcome: Progressing  10/12/2024 0022 by Dale Freeman RN  Outcome: Progressing

## 2024-10-13 LAB
GLUCOSE BLD-MCNC: 152 MG/DL (ref 74–99)
GLUCOSE BLD-MCNC: 156 MG/DL (ref 74–99)
GLUCOSE BLD-MCNC: 163 MG/DL (ref 74–99)
GLUCOSE BLD-MCNC: 164 MG/DL (ref 74–99)

## 2024-10-13 PROCEDURE — 6370000000 HC RX 637 (ALT 250 FOR IP): Performed by: INTERNAL MEDICINE

## 2024-10-13 PROCEDURE — 82962 GLUCOSE BLOOD TEST: CPT

## 2024-10-13 PROCEDURE — 99232 SBSQ HOSP IP/OBS MODERATE 35: CPT | Performed by: INTERNAL MEDICINE

## 2024-10-13 PROCEDURE — 97530 THERAPEUTIC ACTIVITIES: CPT

## 2024-10-13 PROCEDURE — 2060000000 HC ICU INTERMEDIATE R&B

## 2024-10-13 PROCEDURE — 6360000002 HC RX W HCPCS: Performed by: INTERNAL MEDICINE

## 2024-10-13 PROCEDURE — 2700000000 HC OXYGEN THERAPY PER DAY

## 2024-10-13 PROCEDURE — 2580000003 HC RX 258: Performed by: INTERNAL MEDICINE

## 2024-10-13 PROCEDURE — 6360000002 HC RX W HCPCS

## 2024-10-13 RX ORDER — ZOLPIDEM TARTRATE 5 MG/1
5 TABLET ORAL NIGHTLY PRN
Status: DISCONTINUED | OUTPATIENT
Start: 2024-10-13 | End: 2024-10-17 | Stop reason: HOSPADM

## 2024-10-13 RX ORDER — LANOLIN ALCOHOL/MO/W.PET/CERES
6 CREAM (GRAM) TOPICAL NIGHTLY
Status: DISCONTINUED | OUTPATIENT
Start: 2024-10-13 | End: 2024-10-17 | Stop reason: HOSPADM

## 2024-10-13 RX ADMIN — LACTULOSE 20 G: 10 SOLUTION ORAL at 20:44

## 2024-10-13 RX ADMIN — HEPARIN SODIUM 5000 UNITS: 5000 INJECTION INTRAVENOUS; SUBCUTANEOUS at 14:31

## 2024-10-13 RX ADMIN — MEROPENEM 1000 MG: 1 INJECTION INTRAVENOUS at 06:01

## 2024-10-13 RX ADMIN — HYDROCODONE BITARTRATE AND ACETAMINOPHEN 1 TABLET: 5; 325 TABLET ORAL at 20:44

## 2024-10-13 RX ADMIN — ANTI-FUNGAL POWDER MICONAZOLE NITRATE TALC FREE: 1.42 POWDER TOPICAL at 08:17

## 2024-10-13 RX ADMIN — MICONAZOLE NITRATE: 20 OINTMENT TOPICAL at 20:55

## 2024-10-13 RX ADMIN — LEVETIRACETAM 500 MG: 100 INJECTION INTRAVENOUS at 08:17

## 2024-10-13 RX ADMIN — SODIUM CHLORIDE, PRESERVATIVE FREE 10 ML: 5 INJECTION INTRAVENOUS at 21:17

## 2024-10-13 RX ADMIN — HYDROXYZINE PAMOATE 50 MG: 25 CAPSULE ORAL at 20:44

## 2024-10-13 RX ADMIN — MEROPENEM 1000 MG: 1 INJECTION INTRAVENOUS at 14:27

## 2024-10-13 RX ADMIN — MEROPENEM 1000 MG: 1 INJECTION INTRAVENOUS at 20:55

## 2024-10-13 RX ADMIN — MICONAZOLE NITRATE: 20 OINTMENT TOPICAL at 08:16

## 2024-10-13 RX ADMIN — LACTULOSE 20 G: 10 SOLUTION ORAL at 14:29

## 2024-10-13 RX ADMIN — ALLOPURINOL 100 MG: 100 TABLET ORAL at 20:44

## 2024-10-13 RX ADMIN — OXYCODONE HYDROCHLORIDE 5 MG: 5 TABLET ORAL at 08:22

## 2024-10-13 RX ADMIN — ONDANSETRON 4 MG: 2 INJECTION INTRAMUSCULAR; INTRAVENOUS at 20:44

## 2024-10-13 RX ADMIN — RIFAXIMIN 400 MG: 200 TABLET ORAL at 08:23

## 2024-10-13 RX ADMIN — MICONAZOLE NITRATE: 20 OINTMENT TOPICAL at 00:00

## 2024-10-13 RX ADMIN — Medication 500 MG: at 08:23

## 2024-10-13 RX ADMIN — LACTULOSE 20 G: 10 SOLUTION ORAL at 08:25

## 2024-10-13 RX ADMIN — SODIUM CHLORIDE, PRESERVATIVE FREE 10 ML: 5 INJECTION INTRAVENOUS at 08:16

## 2024-10-13 RX ADMIN — RIFAXIMIN 400 MG: 200 TABLET ORAL at 20:59

## 2024-10-13 RX ADMIN — HYDROXYZINE PAMOATE 50 MG: 25 CAPSULE ORAL at 10:13

## 2024-10-13 RX ADMIN — RIFAXIMIN 400 MG: 200 TABLET ORAL at 14:28

## 2024-10-13 RX ADMIN — LEVETIRACETAM 500 MG: 100 INJECTION INTRAVENOUS at 20:44

## 2024-10-13 RX ADMIN — ONDANSETRON 4 MG: 2 INJECTION INTRAMUSCULAR; INTRAVENOUS at 11:24

## 2024-10-13 RX ADMIN — ALLOPURINOL 100 MG: 100 TABLET ORAL at 08:21

## 2024-10-13 RX ADMIN — Medication 6 MG: at 20:44

## 2024-10-13 RX ADMIN — OXYCODONE HYDROCHLORIDE 5 MG: 5 TABLET ORAL at 16:25

## 2024-10-13 RX ADMIN — ANTI-FUNGAL POWDER MICONAZOLE NITRATE TALC FREE: 1.42 POWDER TOPICAL at 21:17

## 2024-10-13 RX ADMIN — ZOLPIDEM TARTRATE 5 MG: 5 TABLET, COATED ORAL at 22:55

## 2024-10-13 RX ADMIN — HEPARIN SODIUM 5000 UNITS: 5000 INJECTION INTRAVENOUS; SUBCUTANEOUS at 21:12

## 2024-10-13 RX ADMIN — COLLAGENASE SANTYL: 250 OINTMENT TOPICAL at 08:17

## 2024-10-13 RX ADMIN — ANTI-FUNGAL POWDER MICONAZOLE NITRATE TALC FREE: 1.42 POWDER TOPICAL at 00:00

## 2024-10-13 ASSESSMENT — PAIN DESCRIPTION - DESCRIPTORS
DESCRIPTORS: ACHING;DISCOMFORT;SORE
DESCRIPTORS: ACHING;THROBBING;DISCOMFORT
DESCRIPTORS: ACHING;DISCOMFORT;SORE

## 2024-10-13 ASSESSMENT — PAIN SCALES - GENERAL
PAINLEVEL_OUTOF10: 10
PAINLEVEL_OUTOF10: 8
PAINLEVEL_OUTOF10: 10

## 2024-10-13 ASSESSMENT — PAIN DESCRIPTION - FREQUENCY
FREQUENCY: CONTINUOUS
FREQUENCY: CONTINUOUS

## 2024-10-13 ASSESSMENT — PAIN DESCRIPTION - ORIENTATION
ORIENTATION: RIGHT
ORIENTATION: RIGHT

## 2024-10-13 ASSESSMENT — PAIN DESCRIPTION - PAIN TYPE
TYPE: CHRONIC PAIN
TYPE: CHRONIC PAIN

## 2024-10-13 ASSESSMENT — PAIN DESCRIPTION - LOCATION
LOCATION: ABDOMEN
LOCATION: LEG
LOCATION: LEG

## 2024-10-13 ASSESSMENT — PAIN SCALES - WONG BAKER: WONGBAKER_NUMERICALRESPONSE: NO HURT

## 2024-10-13 ASSESSMENT — PAIN DESCRIPTION - ONSET
ONSET: ON-GOING
ONSET: ON-GOING

## 2024-10-13 NOTE — PLAN OF CARE
Problem: Discharge Planning  Goal: Discharge to home or other facility with appropriate resources  10/13/2024 0005 by Divina Ram RN  Outcome: Progressing  10/12/2024 2223 by Loraine Dailey RN  Outcome: Progressing  10/12/2024 1007 by Iman Mccollum RN  Outcome: Progressing     Problem: Skin/Tissue Integrity  Goal: Absence of new skin breakdown  Description: 1.  Monitor for areas of redness and/or skin breakdown  2.  Assess vascular access sites hourly  3.  Every 4-6 hours minimum:  Change oxygen saturation probe site  4.  Every 4-6 hours:  If on nasal continuous positive airway pressure, respiratory therapy assess nares and determine need for appliance change or resting period.  10/13/2024 0005 by Divina Ram RN  Outcome: Progressing  10/12/2024 2223 by Loraine Dailey RN  Outcome: Progressing  10/12/2024 1007 by Iman Mccollum RN  Outcome: Progressing     Problem: Safety - Adult  Goal: Free from fall injury  10/13/2024 0005 by Divina Ram RN  Outcome: Progressing  10/12/2024 2223 by Loraine Dailey, RN  Outcome: Progressing  10/12/2024 1007 by Iman Mccollum RN  Outcome: Progressing     Problem: Pain  Goal: Verbalizes/displays adequate comfort level or baseline comfort level  10/13/2024 0005 by Divina Ram RN  Outcome: Progressing  10/12/2024 2223 by Loraine Dailey RN  Outcome: Progressing  10/12/2024 1007 by Iman Mccollum RN  Outcome: Progressing     Problem: Nutrition Deficit:  Goal: Optimize nutritional status  10/13/2024 0005 by Divina Ram RN  Outcome: Progressing  10/12/2024 2223 by Loraine Dailey RN  Outcome: Progressing  10/12/2024 1007 by Iman Mccollum RN  Outcome: Progressing     Problem: ABCDS Injury Assessment  Goal: Absence of physical injury  10/13/2024 0005 by Divina Ram RN  Outcome: Progressing  10/12/2024 2223 by Loraine Dailey, RN  Outcome: Progressing  10/12/2024 1007 by Chun

## 2024-10-13 NOTE — PLAN OF CARE
Problem: Discharge Planning  Goal: Discharge to home or other facility with appropriate resources  10/12/2024 2223 by Loranie Dailey RN  Outcome: Progressing  10/12/2024 1007 by Iman Mccollum RN  Outcome: Progressing     Problem: Skin/Tissue Integrity  Goal: Absence of new skin breakdown  Description: 1.  Monitor for areas of redness and/or skin breakdown  2.  Assess vascular access sites hourly  3.  Every 4-6 hours minimum:  Change oxygen saturation probe site  4.  Every 4-6 hours:  If on nasal continuous positive airway pressure, respiratory therapy assess nares and determine need for appliance change or resting period.  10/12/2024 2223 by Loraine Dailey RN  Outcome: Progressing  10/12/2024 1007 by Iman Mccollum RN  Outcome: Progressing     Problem: Safety - Adult  Goal: Free from fall injury  10/12/2024 2223 by Loraine Dailey RN  Outcome: Progressing  10/12/2024 1007 by Iman Mccollum RN  Outcome: Progressing     Problem: Pain  Goal: Verbalizes/displays adequate comfort level or baseline comfort level  10/12/2024 2223 by Loraine Dailey RN  Outcome: Progressing  10/12/2024 1007 by Iman Mccollum RN  Outcome: Progressing     Problem: Nutrition Deficit:  Goal: Optimize nutritional status  10/12/2024 2223 by Loraine Dailey RN  Outcome: Progressing  10/12/2024 1007 by Iman Mccollum RN  Outcome: Progressing     Problem: ABCDS Injury Assessment  Goal: Absence of physical injury  10/12/2024 2223 by Loraine Dailey RN  Outcome: Progressing  10/12/2024 1007 by Iman Mccollum RN  Outcome: Progressing     Problem: Chronic Conditions and Co-morbidities  Goal: Patient's chronic conditions and co-morbidity symptoms are monitored and maintained or improved  10/12/2024 2223 by Loraine Dailey RN  Outcome: Progressing  10/12/2024 1007 by Iman Mccollum RN  Outcome: Progressing

## 2024-10-13 NOTE — CONSULTS
Palliative Care Department  [phone]  Palliative Care Initial Consult  Provider CHIQUIS Alcantara CNP      PATIENT: Lissa Diggs  : 1965  MRN: [id number]  ADMISSION DATE: 10/4/2024  6:16 PM  Referring Provider: Wilfrido Haque MD     Palliative Medicine was consulted on hospital day 1 for assistance with Goals of care, Code Status Discussion, Family support, Symptom management     HPI:     Clinical Summary:Lissa Diggs is a 59 y.o. y/o female with a history of liver cirrhosis, diabetes, morbid obesity, bedbound, recurrent skin abscesses who presented to [facility] on 10/4/2024 with altered mental status.  Neurology was consulted for possible seizure-like activity.  She was admitted to telemetry for further medical management    ASSESSMENT/PLAN:     Pertinent Hospital Diagnoses     Altered mental status  MOYA cirrhosis  Bedbound  Chronic wounds    Palliative Care Encounter / Counseling Regarding Goals of Care  Please see detailed goals of care discussion as below  At this time, Lissa Diggs, Does Not have capacity for medical decision-making.  Capacity is time limited and situation/question specific  During encounter Olimpia was surrogate medical decision-maker  Outcome of goals of care meeting:  Continue all current medical management  Continue full code  Code status Full Code  Advanced Directives: no POA or living will in epic  Surrogate/Legal NOK:  olimpia diggs () [phone]     Spiritual assessment: no spiritual distress identified  Bereavement and grief: to be determined  Referrals to: none today    Thank you for the opportunity to participate in the care of Lissa Diggs.     CHIQUIS Alcantara CNP   Palliative Medicine     SUBJECTIVE:     Details of Conversation: Chart reviewed and met with the patient and her  Jagdish Pichardo explains that his wife has been in a facility since last November.  He states she originally went in for weight loss and rehab to get knee 
Comprehensive Nutrition Assessment    Type and Reason for Visit:  Reassess, Consult    Nutrition Recommendations/Plan:   Continue Current Diet, Start Oral Nutrition Supplement   [id number] BID & magic cups BID  recommend resuming TF via NGT if PO intake remains poor     TF rec if needed: Diabetic @ 60 ml/hr to provide 1440 ml tv, 2160 kcals, 119gm pro, 1092 ml free water       Malnutrition Assessment:  Malnutrition Status:  At risk for malnutrition (Comment) (10/06/24 5374)    Context:  Acute Illness     Findings of the 6 clinical characteristics of malnutrition:  Energy Intake:  Mild decrease in energy intake (Comment) (since admit - unable to assess intakes pta at this time)  Weight Loss:  Unable to assess (no wt hx per EMR)     Body Fat Loss:  No significant body fat loss     Muscle Mass Loss:  No significant muscle mass loss    Fluid Accumulation:  No significant fluid accumulation     Strength:  Not Performed    Nutrition Assessment:    Pt remains at risk now s/p MBS w/ PO diet advanced per SLP recs to pureed diet/PTL 10/9 - PO intake appears minimal. TF was stopped, however NGT remains in place. Pt admit w/ AMS & seizure activity w/ multiple pressure injuries/wounds & UTI. Noted s/p fall 3 mo ago w/ worsening AMS since. PMHx DM, MOYA cirrhosis, recurrent skin abcesses, bedbound status. Will add appropriate ONS. Recommend resuming EN support if PO intake remains minimal as pt w/ high nutrient needs for wound healing. Will monitor.    Nutrition Related Findings:    pt alert, NGT (TF off), active BS, abd distention, dysphagia, +2 pitting edema, hyperglycemia Wound Type: Multiple, Pressure Injury, Stage III, Surgical Incision, Stage II, Partial Thickness       Current Nutrition Intake & Therapies:    Average Meal Intake: 1-25%  Average Supplements Intake: None Ordered  ADULT DIET; Dysphagia - Pureed; Extremely Thick (Pudding)    Anthropometric Measures:  Height: 162.6 cm (5' 4\")  Ideal Body Weight (IBW): 120 
Nutrition Assessment    Type and Reason for Visit:  Consult, Wound (ONS)    Nutrition Recommendations/Plan:   Continue current diet per SLP ; pt on pudding thick liquids, limited ONS options w/ thickened liquids ; continue gelatein BID and magic cup BID to aid in wound healing and promote oral intake   If oral intake remains suboptimal, recommend initiating EN support to meet nutritional needs    TF rec provided, recommend;     Diabetic (Glucerna 1.5) @ 60 ml/hr to provide 1440 ml tv, 2160 kcals, 119gm pro, 1092 ml free water.    See RD note on 10/10 for full nutrition assessment.       Current Nutrition Intake & Therapies:    Average Meal Intake: 1-25%  Average Supplements Intake: Unable to assess  ADULT DIET; Dysphagia - Pureed; Extremely Thick (Pudding)  ADULT ORAL NUTRITION SUPPLEMENT; Breakfast, Dinner; Fortified Gelatin Oral Supplement  ADULT ORAL NUTRITION SUPPLEMENT; Lunch, Dinner; Frozen Oral Supplement  Diet NPO Exceptions are: Sips of Water with Meds    Estimated Daily Nutrient Needs:  Energy Requirements Based On: Formula  Weight Used for Energy Requirements: Current  Energy (kcal/day): 3086-4662  Weight Used for Protein Requirements: Ideal  Protein (g/day): 110-135 (2.0-2.5 gm/kg IBW)  Method Used for Fluid Requirements: 1 ml/kcal  Fluid (ml/day): 3546-1752      Nutrition Interventions:   Food and/or Nutrient Delivery: Continue Current Diet, Continue Oral Nutrition Supplement (continue Gelatein BID & magic cup BID d/t pudding thick liquids; recommend starting TF if PO intake remains poor - TF rec if needed: Diabetic @ 60 ml/hr to provide 1440 ml tv, 2160 kcals, 119gm pro, 1092 ml free water)  Nutrition Education/Counseling: Education not appropriate  Coordination of Nutrition Care: Continue to monitor while inpatient           Alberta Lozano, RD, LD  Contact: 1430    
  ALLERGIC/IMMUNOLOGIC:  No anaphylaxis.   ENDOCRINE:  No polyuria or polydipsia or temperature intolerance.    MUSCULOSKELETAL:  No myalgia or arthralgia.  Full ROM.  NEUROLOGICAL:  No focal motor sensory deficit.  BEHAVIOR/PSYCH:  No psychosis.     PHYSICAL EXAM:    Vitals:    BP (!) 117/56   Pulse 96   Temp 97.3 °F (36.3 °C) (Temporal)   Resp 18   Wt 119.3 kg (263 lb)   SpO2 100%   BMI 45.14 kg/m²   Constitutional: The patient is awake, alert, and oriented.   Skin: Warm and dry. No rashes were noted. No jaundice.  HEENT: Eyes show round, and reactive pupils. Moist mucous membranes, no ulcerations, no thrush.   Neck: Supple to movements. No lymphadenopathy.   Chest: No use of accessory muscles to breathe. Symmetrical expansion. Auscultation reveals no wheezing, crackles, or rhonchi.   Cardiovascular: S1 and S2 are rhythmic and regular. No murmurs appreciated.   Abdomen: Positive bowel sounds to auscultation. Benign to palpation. No masses felt. No hepatosplenomegaly.  Genitourinary: Minimal tenderness in the lower abdomen  Extremities: No clubbing, no cyanosis, no edema.  Musculoskeletal: Multiple skin wounds noted, sacral decubitus ulcer, posterior thigh infected ulcer, right heel pressure ulcer  Neurological: Following commands, no focal neurodeficit  Lines: peripheral      CBC+dif:  Recent Labs     10/04/24  2053 10/05/24  0502 10/06/24  0618   WBC 8.0 6.8 2.5*   HGB 9.4* 8.4* 6.7*   HCT 29.7* 27.8* 21.3*   .5* 117.8* 112.7*   PLT 83* 59* 48*   NEUTROABS 6.40 5.67 1.93     Lab Results   Component Value Date    CRP 50.0 (H) 10/05/2024    CRP 1.5 (H) 10/24/2022     No results found for: \"CRPHS\"  Lab Results   Component Value Date    SEDRATE 40 (H) 10/04/2024    SEDRATE 87 (H) 10/24/2022     Lab Results   Component Value Date    ALT 10 10/06/2024    AST 20 10/06/2024    ALKPHOS 84 10/06/2024    BILITOT 0.9 10/06/2024     Lab Results   Component Value Date/Time     10/06/2024 06:18 AM    K 4.7 
  Result Value Ref Range    POC Glucose 159 (H) 74 - 99 mg/dL   Basic Metabolic Panel    Collection Time: 10/05/24  4:39 PM   Result Value Ref Range    Sodium 140 132 - 146 mmol/L    Potassium 5.0 3.5 - 5.0 mmol/L    Chloride 113 (H) 98 - 107 mmol/L    CO2 22 22 - 29 mmol/L    Anion Gap 5 (L) 7 - 16 mmol/L    Glucose 168 (H) 74 - 99 mg/dL    BUN 29 (H) 6 - 23 mg/dL    Creatinine 0.8 0.50 - 1.00 mg/dL    Est, Glom Filt Rate 83 >60 mL/min/1.73m2    Calcium 8.8 8.6 - 10.2 mg/dL   POCT Glucose    Collection Time: 10/05/24  7:44 PM   Result Value Ref Range    POC Glucose 171 (H) 74 - 99 mg/dL       Radiology:  No results found.    The patient's records from referring provider and available information in the EHR was reviewed.    No imaging studies relative to this admission were available for review since they were done outside hospital.      Impression:  Acute on chronic progressive encephalopathy  Seizure-like activity: Patient with tremors and eyes rolled back but no clear pattern of seizure activity or postictal state that could be discerned.  At baseline patient is lethargic and poorly responsive and now bedbound.  Cognitive decline: Progressive decline over the last 6 months at least.  History of TBI with concussion: Patient with fall forwards hitting her head resulting in concussion and has had progressive decline since.  Cervicalgia no: moderate to severe neck tenderness on palpation cervicals paraspinals from base of skull extending down to shoulder level.    Principal Problem:    Acute delirium  Active Problems:    Seizure-like activity (HCC)    Palliative care by specialist    Goals of care, counseling/discussion  Resolved Problems:    * No resolved hospital problems. *      Recommendations:                                            MRI brain for further evaluation  EEG given tremors with concern for seizure activity  MRI of cervical spine for evaluation of neck tenderness and suspected cervical DJD with

## 2024-10-13 NOTE — PLAN OF CARE
Problem: Discharge Planning  Goal: Discharge to home or other facility with appropriate resources  10/13/2024 0955 by Henny Hernandez RN  Outcome: Progressing  10/13/2024 0005 by Divina Ram RN  Outcome: Progressing  10/12/2024 2223 by Loraine Dailey RN  Outcome: Progressing     Problem: Skin/Tissue Integrity  Goal: Absence of new skin breakdown  Description: 1.  Monitor for areas of redness and/or skin breakdown  2.  Assess vascular access sites hourly  3.  Every 4-6 hours minimum:  Change oxygen saturation probe site  4.  Every 4-6 hours:  If on nasal continuous positive airway pressure, respiratory therapy assess nares and determine need for appliance change or resting period.  10/13/2024 0955 by Henny Hernandez RN  Outcome: Progressing  10/13/2024 0005 by Divina Ram RN  Outcome: Progressing  10/12/2024 2223 by Loraine Dailey RN  Outcome: Progressing     Problem: Safety - Adult  Goal: Free from fall injury  10/13/2024 0955 by Henny Hernandez RN  Outcome: Progressing  10/13/2024 0005 by Divina Ram RN  Outcome: Progressing  10/12/2024 2223 by Loraine Dailey RN  Outcome: Progressing     Problem: Pain  Goal: Verbalizes/displays adequate comfort level or baseline comfort level  10/13/2024 0955 by Henny Hernandez RN  Outcome: Progressing  10/13/2024 0005 by Divina Ram RN  Outcome: Progressing  10/12/2024 2223 by Loraine Dailey RN  Outcome: Progressing     Problem: Nutrition Deficit:  Goal: Optimize nutritional status  10/13/2024 0955 by Henny Hernandez RN  Outcome: Progressing  10/13/2024 0005 by Divina Ram RN  Outcome: Progressing  10/12/2024 2223 by Loraine Dailey RN  Outcome: Progressing     Problem: ABCDS Injury Assessment  Goal: Absence of physical injury  10/13/2024 0005 by Divina Ram RN  Outcome: Progressing  10/12/2024 2223 by Loraine Dailey RN  Outcome: Progressing     Problem: Chronic Conditions and

## 2024-10-14 LAB
ANION GAP SERPL CALCULATED.3IONS-SCNC: 12 MMOL/L (ref 7–16)
BASOPHILS # BLD: 0.03 K/UL (ref 0–0.2)
BASOPHILS NFR BLD: 1 % (ref 0–2)
BUN SERPL-MCNC: 21 MG/DL (ref 6–23)
CALCIUM SERPL-MCNC: 9.6 MG/DL (ref 8.6–10.2)
CHLORIDE SERPL-SCNC: 108 MMOL/L (ref 98–107)
CO2 SERPL-SCNC: 24 MMOL/L (ref 22–29)
CREAT SERPL-MCNC: 0.7 MG/DL (ref 0.5–1)
EOSINOPHIL # BLD: 0.19 K/UL (ref 0.05–0.5)
EOSINOPHILS RELATIVE PERCENT: 4 % (ref 0–6)
ERYTHROCYTE [DISTWIDTH] IN BLOOD BY AUTOMATED COUNT: 19.1 % (ref 11.5–15)
GFR, ESTIMATED: >90 ML/MIN/1.73M2
GLUCOSE BLD-MCNC: 132 MG/DL (ref 74–99)
GLUCOSE BLD-MCNC: 132 MG/DL (ref 74–99)
GLUCOSE BLD-MCNC: 143 MG/DL (ref 74–99)
GLUCOSE BLD-MCNC: 145 MG/DL (ref 74–99)
GLUCOSE SERPL-MCNC: 135 MG/DL (ref 74–99)
HCT VFR BLD AUTO: 30 % (ref 34–48)
HGB BLD-MCNC: 9.3 G/DL (ref 11.5–15.5)
IMM GRANULOCYTES # BLD AUTO: 0.03 K/UL (ref 0–0.58)
IMM GRANULOCYTES NFR BLD: 1 % (ref 0–5)
LYMPHOCYTES NFR BLD: 0.9 K/UL (ref 1.5–4)
LYMPHOCYTES RELATIVE PERCENT: 21 % (ref 20–42)
MCH RBC QN AUTO: 34.1 PG (ref 26–35)
MCHC RBC AUTO-ENTMCNC: 31 G/DL (ref 32–34.5)
MCV RBC AUTO: 109.9 FL (ref 80–99.9)
MONOCYTES NFR BLD: 0.52 K/UL (ref 0.1–0.95)
MONOCYTES NFR BLD: 12 % (ref 2–12)
NEUTROPHILS NFR BLD: 61 % (ref 43–80)
NEUTS SEG NFR BLD: 2.65 K/UL (ref 1.8–7.3)
PLATELET # BLD AUTO: 101 K/UL (ref 130–450)
PMV BLD AUTO: 10.8 FL (ref 7–12)
POTASSIUM SERPL-SCNC: 4.5 MMOL/L (ref 3.5–5)
RBC # BLD AUTO: 2.73 M/UL (ref 3.5–5.5)
SODIUM SERPL-SCNC: 144 MMOL/L (ref 132–146)
WBC OTHER # BLD: 4.3 K/UL (ref 4.5–11.5)

## 2024-10-14 PROCEDURE — 97530 THERAPEUTIC ACTIVITIES: CPT

## 2024-10-14 PROCEDURE — 97110 THERAPEUTIC EXERCISES: CPT

## 2024-10-14 PROCEDURE — 6370000000 HC RX 637 (ALT 250 FOR IP): Performed by: INTERNAL MEDICINE

## 2024-10-14 PROCEDURE — 6360000002 HC RX W HCPCS: Performed by: INTERNAL MEDICINE

## 2024-10-14 PROCEDURE — 99232 SBSQ HOSP IP/OBS MODERATE 35: CPT

## 2024-10-14 PROCEDURE — 51702 INSERT TEMP BLADDER CATH: CPT

## 2024-10-14 PROCEDURE — 2700000000 HC OXYGEN THERAPY PER DAY

## 2024-10-14 PROCEDURE — 80048 BASIC METABOLIC PNL TOTAL CA: CPT

## 2024-10-14 PROCEDURE — 2060000000 HC ICU INTERMEDIATE R&B

## 2024-10-14 PROCEDURE — 85025 COMPLETE CBC W/AUTO DIFF WBC: CPT

## 2024-10-14 PROCEDURE — 2580000003 HC RX 258: Performed by: INTERNAL MEDICINE

## 2024-10-14 PROCEDURE — 6360000002 HC RX W HCPCS

## 2024-10-14 PROCEDURE — 82962 GLUCOSE BLOOD TEST: CPT

## 2024-10-14 RX ORDER — MORPHINE SULFATE 2 MG/ML
2 INJECTION, SOLUTION INTRAMUSCULAR; INTRAVENOUS EVERY 4 HOURS PRN
Status: DISCONTINUED | OUTPATIENT
Start: 2024-10-14 | End: 2024-10-17 | Stop reason: HOSPADM

## 2024-10-14 RX ADMIN — RIFAXIMIN 400 MG: 200 TABLET ORAL at 08:16

## 2024-10-14 RX ADMIN — MEROPENEM 1000 MG: 1 INJECTION INTRAVENOUS at 06:30

## 2024-10-14 RX ADMIN — MEROPENEM 1000 MG: 1 INJECTION INTRAVENOUS at 22:24

## 2024-10-14 RX ADMIN — MICONAZOLE NITRATE: 20 OINTMENT TOPICAL at 08:16

## 2024-10-14 RX ADMIN — SODIUM CHLORIDE, PRESERVATIVE FREE 10 ML: 5 INJECTION INTRAVENOUS at 08:12

## 2024-10-14 RX ADMIN — SODIUM CHLORIDE, PRESERVATIVE FREE 10 ML: 5 INJECTION INTRAVENOUS at 22:24

## 2024-10-14 RX ADMIN — HEPARIN SODIUM 5000 UNITS: 5000 INJECTION INTRAVENOUS; SUBCUTANEOUS at 14:22

## 2024-10-14 RX ADMIN — LEVETIRACETAM 500 MG: 100 INJECTION INTRAVENOUS at 08:11

## 2024-10-14 RX ADMIN — HEPARIN SODIUM 5000 UNITS: 5000 INJECTION INTRAVENOUS; SUBCUTANEOUS at 06:30

## 2024-10-14 RX ADMIN — LACTULOSE 20 G: 10 SOLUTION ORAL at 22:14

## 2024-10-14 RX ADMIN — OXYCODONE HYDROCHLORIDE 5 MG: 5 TABLET ORAL at 17:28

## 2024-10-14 RX ADMIN — MEROPENEM 1000 MG: 1 INJECTION INTRAVENOUS at 14:20

## 2024-10-14 RX ADMIN — LEVETIRACETAM 500 MG: 100 INJECTION INTRAVENOUS at 22:13

## 2024-10-14 RX ADMIN — ANTI-FUNGAL POWDER MICONAZOLE NITRATE TALC FREE: 1.42 POWDER TOPICAL at 08:15

## 2024-10-14 RX ADMIN — COLLAGENASE SANTYL: 250 OINTMENT TOPICAL at 08:14

## 2024-10-14 RX ADMIN — ANTI-FUNGAL POWDER MICONAZOLE NITRATE TALC FREE: 1.42 POWDER TOPICAL at 22:25

## 2024-10-14 RX ADMIN — RIFAXIMIN 400 MG: 200 TABLET ORAL at 14:23

## 2024-10-14 RX ADMIN — LACTULOSE 20 G: 10 SOLUTION ORAL at 14:27

## 2024-10-14 RX ADMIN — HEPARIN SODIUM 5000 UNITS: 5000 INJECTION INTRAVENOUS; SUBCUTANEOUS at 22:14

## 2024-10-14 RX ADMIN — MORPHINE SULFATE 2 MG: 2 INJECTION, SOLUTION INTRAMUSCULAR; INTRAVENOUS at 22:14

## 2024-10-14 RX ADMIN — ALLOPURINOL 100 MG: 100 TABLET ORAL at 08:16

## 2024-10-14 RX ADMIN — ALLOPURINOL 100 MG: 100 TABLET ORAL at 22:13

## 2024-10-14 RX ADMIN — MICONAZOLE NITRATE: 20 OINTMENT TOPICAL at 22:25

## 2024-10-14 RX ADMIN — MORPHINE SULFATE 2 MG: 2 INJECTION, SOLUTION INTRAMUSCULAR; INTRAVENOUS at 12:06

## 2024-10-14 RX ADMIN — MORPHINE SULFATE 2 MG: 2 INJECTION, SOLUTION INTRAMUSCULAR; INTRAVENOUS at 16:12

## 2024-10-14 ASSESSMENT — PAIN DESCRIPTION - FREQUENCY
FREQUENCY: CONTINUOUS

## 2024-10-14 ASSESSMENT — PAIN DESCRIPTION - LOCATION
LOCATION: LEG
LOCATION: BUTTOCKS;LEG;ABDOMEN
LOCATION: BUTTOCKS
LOCATION: LEG
LOCATION: LEG

## 2024-10-14 ASSESSMENT — PAIN DESCRIPTION - DESCRIPTORS
DESCRIPTORS: ACHING;DISCOMFORT;SORE
DESCRIPTORS: ACHING;DISCOMFORT;SORE
DESCRIPTORS: ACHING;DISCOMFORT;SHOOTING;SORE
DESCRIPTORS: ACHING;DISCOMFORT;SORE

## 2024-10-14 ASSESSMENT — PAIN SCALES - GENERAL
PAINLEVEL_OUTOF10: 7
PAINLEVEL_OUTOF10: 10
PAINLEVEL_OUTOF10: 5
PAINLEVEL_OUTOF10: 0
PAINLEVEL_OUTOF10: 7
PAINLEVEL_OUTOF10: 9

## 2024-10-14 ASSESSMENT — PAIN DESCRIPTION - PAIN TYPE
TYPE: CHRONIC PAIN

## 2024-10-14 ASSESSMENT — PAIN DESCRIPTION - ORIENTATION
ORIENTATION: RIGHT
ORIENTATION: RIGHT;LEFT
ORIENTATION: RIGHT
ORIENTATION: RIGHT

## 2024-10-14 ASSESSMENT — PAIN DESCRIPTION - ONSET
ONSET: ON-GOING

## 2024-10-14 ASSESSMENT — PAIN - FUNCTIONAL ASSESSMENT
PAIN_FUNCTIONAL_ASSESSMENT: PREVENTS OR INTERFERES WITH ALL ACTIVE AND SOME PASSIVE ACTIVITIES
PAIN_FUNCTIONAL_ASSESSMENT: PREVENTS OR INTERFERES SOME ACTIVE ACTIVITIES AND ADLS

## 2024-10-14 NOTE — PLAN OF CARE
Problem: Discharge Planning  Goal: Discharge to home or other facility with appropriate resources  10/14/2024 0914 by Henny Hernandez RN  Outcome: Progressing  10/13/2024 2332 by Iman Mccollum RN  Outcome: Progressing     Problem: Skin/Tissue Integrity  Goal: Absence of new skin breakdown  Description: 1.  Monitor for areas of redness and/or skin breakdown  2.  Assess vascular access sites hourly  3.  Every 4-6 hours minimum:  Change oxygen saturation probe site  4.  Every 4-6 hours:  If on nasal continuous positive airway pressure, respiratory therapy assess nares and determine need for appliance change or resting period.  10/14/2024 0914 by Henny Hernandez RN  Outcome: Progressing  10/13/2024 2332 by Iman Mccollum RN  Outcome: Progressing     Problem: Safety - Adult  Goal: Free from fall injury  10/14/2024 0914 by Henny Hernandez RN  Outcome: Progressing  10/13/2024 2332 by Iman Mccollum RN  Outcome: Progressing     Problem: Pain  Goal: Verbalizes/displays adequate comfort level or baseline comfort level  10/14/2024 0914 by Henny Hernandez RN  Outcome: Progressing  10/13/2024 2332 by Iman Mccollum RN  Outcome: Progressing     Problem: Nutrition Deficit:  Goal: Optimize nutritional status  10/14/2024 0914 by Henny Hernandez RN  Outcome: Progressing  10/13/2024 2332 by Iman Mccollum RN  Outcome: Progressing     Problem: ABCDS Injury Assessment  Goal: Absence of physical injury  10/14/2024 0914 by Henny Hernandez RN  Outcome: Progressing  10/13/2024 2332 by Iman Mccollum RN  Outcome: Progressing     Problem: Chronic Conditions and Co-morbidities  Goal: Patient's chronic conditions and co-morbidity symptoms are monitored and maintained or improved  10/14/2024 0914 by Henny Hernandez RN  Outcome: Progressing  10/13/2024 2332 by Iman Mccollum RN  Outcome: Progressing

## 2024-10-14 NOTE — PLAN OF CARE
Problem: Discharge Planning  Goal: Discharge to home or other facility with appropriate resources  10/13/2024 2332 by Iman Mccollum RN  Outcome: Progressing  10/13/2024 0955 by Henny Hernandez RN  Outcome: Progressing     Problem: Skin/Tissue Integrity  Goal: Absence of new skin breakdown  Description: 1.  Monitor for areas of redness and/or skin breakdown  2.  Assess vascular access sites hourly  3.  Every 4-6 hours minimum:  Change oxygen saturation probe site  4.  Every 4-6 hours:  If on nasal continuous positive airway pressure, respiratory therapy assess nares and determine need for appliance change or resting period.  10/13/2024 2332 by Iman Mccollum RN  Outcome: Progressing  10/13/2024 0955 by Henny Hernandez RN  Outcome: Progressing     Problem: Safety - Adult  Goal: Free from fall injury  10/13/2024 2332 by Iman Mccollum RN  Outcome: Progressing  10/13/2024 0955 by Henny Hernandez RN  Outcome: Progressing     Problem: Pain  Goal: Verbalizes/displays adequate comfort level or baseline comfort level  10/13/2024 2332 by Iman Mccollum RN  Outcome: Progressing  10/13/2024 0955 by Henny Hernandez RN  Outcome: Progressing     Problem: Nutrition Deficit:  Goal: Optimize nutritional status  10/13/2024 2332 by Iman Mccollum RN  Outcome: Progressing  10/13/2024 0955 by Henny Hernandez RN  Outcome: Progressing     Problem: ABCDS Injury Assessment  Goal: Absence of physical injury  Outcome: Progressing     Problem: Chronic Conditions and Co-morbidities  Goal: Patient's chronic conditions and co-morbidity symptoms are monitored and maintained or improved  Outcome: Progressing

## 2024-10-15 ENCOUNTER — APPOINTMENT (OUTPATIENT)
Dept: ULTRASOUND IMAGING | Age: 59
DRG: 698 | End: 2024-10-15
Attending: INTERNAL MEDICINE
Payer: COMMERCIAL

## 2024-10-15 LAB
ANION GAP SERPL CALCULATED.3IONS-SCNC: 7 MMOL/L (ref 7–16)
BASOPHILS # BLD: 0.02 K/UL (ref 0–0.2)
BASOPHILS NFR BLD: 1 % (ref 0–2)
BUN SERPL-MCNC: 21 MG/DL (ref 6–23)
CALCIUM SERPL-MCNC: 9.2 MG/DL (ref 8.6–10.2)
CHLORIDE SERPL-SCNC: 111 MMOL/L (ref 98–107)
CO2 SERPL-SCNC: 28 MMOL/L (ref 22–29)
CREAT SERPL-MCNC: 0.7 MG/DL (ref 0.5–1)
EOSINOPHIL # BLD: 0.1 K/UL (ref 0.05–0.5)
EOSINOPHILS RELATIVE PERCENT: 4 % (ref 0–6)
ERYTHROCYTE [DISTWIDTH] IN BLOOD BY AUTOMATED COUNT: 18.7 % (ref 11.5–15)
GFR, ESTIMATED: >90 ML/MIN/1.73M2
GLUCOSE BLD-MCNC: 111 MG/DL (ref 74–99)
GLUCOSE BLD-MCNC: 145 MG/DL (ref 74–99)
GLUCOSE BLD-MCNC: 159 MG/DL (ref 74–99)
GLUCOSE BLD-MCNC: 161 MG/DL (ref 74–99)
GLUCOSE SERPL-MCNC: 117 MG/DL (ref 74–99)
HCT VFR BLD AUTO: 26.6 % (ref 34–48)
HGB BLD-MCNC: 8.2 G/DL (ref 11.5–15.5)
IMM GRANULOCYTES # BLD AUTO: <0.03 K/UL (ref 0–0.58)
IMM GRANULOCYTES NFR BLD: 0 % (ref 0–5)
LYMPHOCYTES NFR BLD: 0.58 K/UL (ref 1.5–4)
LYMPHOCYTES RELATIVE PERCENT: 22 % (ref 20–42)
MCH RBC QN AUTO: 34.3 PG (ref 26–35)
MCHC RBC AUTO-ENTMCNC: 30.8 G/DL (ref 32–34.5)
MCV RBC AUTO: 111.3 FL (ref 80–99.9)
MONOCYTES NFR BLD: 0.28 K/UL (ref 0.1–0.95)
MONOCYTES NFR BLD: 11 % (ref 2–12)
NEUTROPHILS NFR BLD: 62 % (ref 43–80)
NEUTS SEG NFR BLD: 1.64 K/UL (ref 1.8–7.3)
PLATELET # BLD AUTO: 79 K/UL (ref 130–450)
PLATELET CONFIRMATION: NORMAL
PMV BLD AUTO: 10.8 FL (ref 7–12)
POTASSIUM SERPL-SCNC: 4.2 MMOL/L (ref 3.5–5)
RBC # BLD AUTO: 2.39 M/UL (ref 3.5–5.5)
RBC # BLD: ABNORMAL 10*6/UL
SODIUM SERPL-SCNC: 146 MMOL/L (ref 132–146)
WBC OTHER # BLD: 2.6 K/UL (ref 4.5–11.5)

## 2024-10-15 PROCEDURE — 6360000002 HC RX W HCPCS: Performed by: INTERNAL MEDICINE

## 2024-10-15 PROCEDURE — 2060000000 HC ICU INTERMEDIATE R&B

## 2024-10-15 PROCEDURE — 6370000000 HC RX 637 (ALT 250 FOR IP): Performed by: INTERNAL MEDICINE

## 2024-10-15 PROCEDURE — 85025 COMPLETE CBC W/AUTO DIFF WBC: CPT

## 2024-10-15 PROCEDURE — 6360000002 HC RX W HCPCS

## 2024-10-15 PROCEDURE — 51702 INSERT TEMP BLADDER CATH: CPT

## 2024-10-15 PROCEDURE — 2700000000 HC OXYGEN THERAPY PER DAY

## 2024-10-15 PROCEDURE — 80048 BASIC METABOLIC PNL TOTAL CA: CPT

## 2024-10-15 PROCEDURE — 76536 US EXAM OF HEAD AND NECK: CPT

## 2024-10-15 PROCEDURE — 2580000003 HC RX 258: Performed by: INTERNAL MEDICINE

## 2024-10-15 PROCEDURE — 82962 GLUCOSE BLOOD TEST: CPT

## 2024-10-15 RX ADMIN — ANTI-FUNGAL POWDER MICONAZOLE NITRATE TALC FREE: 1.42 POWDER TOPICAL at 08:53

## 2024-10-15 RX ADMIN — MORPHINE SULFATE 2 MG: 2 INJECTION, SOLUTION INTRAMUSCULAR; INTRAVENOUS at 13:03

## 2024-10-15 RX ADMIN — SODIUM CHLORIDE, PRESERVATIVE FREE 10 ML: 5 INJECTION INTRAVENOUS at 08:52

## 2024-10-15 RX ADMIN — HEPARIN SODIUM 5000 UNITS: 5000 INJECTION INTRAVENOUS; SUBCUTANEOUS at 22:27

## 2024-10-15 RX ADMIN — OXYCODONE HYDROCHLORIDE 5 MG: 5 TABLET ORAL at 00:37

## 2024-10-15 RX ADMIN — Medication 6 MG: at 22:26

## 2024-10-15 RX ADMIN — LACTULOSE 20 G: 10 SOLUTION ORAL at 22:27

## 2024-10-15 RX ADMIN — HEPARIN SODIUM 5000 UNITS: 5000 INJECTION INTRAVENOUS; SUBCUTANEOUS at 06:42

## 2024-10-15 RX ADMIN — MEROPENEM 1000 MG: 1 INJECTION INTRAVENOUS at 14:31

## 2024-10-15 RX ADMIN — HYDROCODONE BITARTRATE AND ACETAMINOPHEN 1 TABLET: 5; 325 TABLET ORAL at 22:26

## 2024-10-15 RX ADMIN — ALLOPURINOL 100 MG: 100 TABLET ORAL at 08:52

## 2024-10-15 RX ADMIN — MICONAZOLE NITRATE: 20 OINTMENT TOPICAL at 08:53

## 2024-10-15 RX ADMIN — LACTULOSE 20 G: 10 SOLUTION ORAL at 14:32

## 2024-10-15 RX ADMIN — Medication 500 MG: at 08:52

## 2024-10-15 RX ADMIN — ALLOPURINOL 100 MG: 100 TABLET ORAL at 22:26

## 2024-10-15 RX ADMIN — HYDROXYZINE PAMOATE 50 MG: 25 CAPSULE ORAL at 22:26

## 2024-10-15 RX ADMIN — RIFAXIMIN 400 MG: 200 TABLET ORAL at 14:32

## 2024-10-15 RX ADMIN — MEROPENEM 1000 MG: 1 INJECTION INTRAVENOUS at 22:36

## 2024-10-15 RX ADMIN — OXYCODONE HYDROCHLORIDE 5 MG: 5 TABLET ORAL at 09:45

## 2024-10-15 RX ADMIN — LEVETIRACETAM 500 MG: 100 INJECTION INTRAVENOUS at 22:26

## 2024-10-15 RX ADMIN — RIFAXIMIN 400 MG: 200 TABLET ORAL at 00:05

## 2024-10-15 RX ADMIN — COLLAGENASE SANTYL: 250 OINTMENT TOPICAL at 08:54

## 2024-10-15 RX ADMIN — RIFAXIMIN 400 MG: 200 TABLET ORAL at 08:52

## 2024-10-15 RX ADMIN — MEROPENEM 1000 MG: 1 INJECTION INTRAVENOUS at 06:42

## 2024-10-15 RX ADMIN — HEPARIN SODIUM 5000 UNITS: 5000 INJECTION INTRAVENOUS; SUBCUTANEOUS at 14:32

## 2024-10-15 RX ADMIN — OXYCODONE HYDROCHLORIDE 5 MG: 5 TABLET ORAL at 16:30

## 2024-10-15 RX ADMIN — LACTULOSE 20 G: 10 SOLUTION ORAL at 08:52

## 2024-10-15 RX ADMIN — LEVETIRACETAM 500 MG: 100 INJECTION INTRAVENOUS at 08:52

## 2024-10-15 RX ADMIN — RIFAXIMIN 400 MG: 200 TABLET ORAL at 22:26

## 2024-10-15 ASSESSMENT — PAIN DESCRIPTION - ORIENTATION
ORIENTATION: RIGHT;LEFT
ORIENTATION: RIGHT;LOWER

## 2024-10-15 ASSESSMENT — PAIN SCALES - GENERAL
PAINLEVEL_OUTOF10: 9
PAINLEVEL_OUTOF10: 8
PAINLEVEL_OUTOF10: 10
PAINLEVEL_OUTOF10: 10
PAINLEVEL_OUTOF10: 9
PAINLEVEL_OUTOF10: 0

## 2024-10-15 ASSESSMENT — PAIN - FUNCTIONAL ASSESSMENT
PAIN_FUNCTIONAL_ASSESSMENT: PREVENTS OR INTERFERES SOME ACTIVE ACTIVITIES AND ADLS
PAIN_FUNCTIONAL_ASSESSMENT: ACTIVITIES ARE NOT PREVENTED
PAIN_FUNCTIONAL_ASSESSMENT: PREVENTS OR INTERFERES SOME ACTIVE ACTIVITIES AND ADLS

## 2024-10-15 ASSESSMENT — PAIN DESCRIPTION - PAIN TYPE
TYPE: CHRONIC PAIN

## 2024-10-15 ASSESSMENT — PAIN DESCRIPTION - ONSET
ONSET: ON-GOING

## 2024-10-15 ASSESSMENT — PAIN DESCRIPTION - DESCRIPTORS
DESCRIPTORS: ACHING;DISCOMFORT;SORE;TENDER
DESCRIPTORS: ACHING;SORE;DISCOMFORT
DESCRIPTORS: ACHING;DISCOMFORT;SORE;TENDER

## 2024-10-15 ASSESSMENT — PAIN DESCRIPTION - FREQUENCY
FREQUENCY: CONTINUOUS

## 2024-10-15 ASSESSMENT — PAIN DESCRIPTION - LOCATION
LOCATION: BUTTOCKS
LOCATION: GENERALIZED
LOCATION: BUTTOCKS
LOCATION: BUTTOCKS

## 2024-10-15 ASSESSMENT — PAIN SCALES - WONG BAKER: WONGBAKER_NUMERICALRESPONSE: NO HURT

## 2024-10-15 NOTE — PLAN OF CARE
MRI C spine remains pending under anesthesia. Will be done Wednesday at 1400-- will follow up afterwards.    Call if problems arise prior to then.

## 2024-10-16 ENCOUNTER — ANESTHESIA (OUTPATIENT)
Dept: MRI IMAGING | Age: 59
DRG: 698 | End: 2024-10-16
Payer: COMMERCIAL

## 2024-10-16 ENCOUNTER — APPOINTMENT (OUTPATIENT)
Dept: MRI IMAGING | Age: 59
DRG: 698 | End: 2024-10-16
Attending: INTERNAL MEDICINE
Payer: COMMERCIAL

## 2024-10-16 ENCOUNTER — ANESTHESIA EVENT (OUTPATIENT)
Dept: MRI IMAGING | Age: 59
DRG: 698 | End: 2024-10-16
Payer: COMMERCIAL

## 2024-10-16 PROBLEM — M48.02 CERVICAL STENOSIS OF SPINE: Status: ACTIVE | Noted: 2024-10-16

## 2024-10-16 PROBLEM — G93.41 ACUTE METABOLIC ENCEPHALOPATHY: Status: ACTIVE | Noted: 2024-10-16

## 2024-10-16 LAB
ANION GAP SERPL CALCULATED.3IONS-SCNC: 2 MMOL/L (ref 7–16)
BASOPHILS # BLD: 0.02 K/UL (ref 0–0.2)
BASOPHILS NFR BLD: 1 % (ref 0–2)
BUN SERPL-MCNC: 21 MG/DL (ref 6–23)
CALCIUM SERPL-MCNC: 9.5 MG/DL (ref 8.6–10.2)
CHLORIDE SERPL-SCNC: 111 MMOL/L (ref 98–107)
CO2 SERPL-SCNC: 32 MMOL/L (ref 22–29)
CREAT SERPL-MCNC: 0.7 MG/DL (ref 0.5–1)
EOSINOPHIL # BLD: 0.15 K/UL (ref 0.05–0.5)
EOSINOPHILS RELATIVE PERCENT: 4 % (ref 0–6)
ERYTHROCYTE [DISTWIDTH] IN BLOOD BY AUTOMATED COUNT: 18.6 % (ref 11.5–15)
GFR, ESTIMATED: >90 ML/MIN/1.73M2
GLUCOSE BLD-MCNC: 127 MG/DL (ref 74–99)
GLUCOSE BLD-MCNC: 128 MG/DL (ref 74–99)
GLUCOSE BLD-MCNC: 132 MG/DL (ref 74–99)
GLUCOSE BLD-MCNC: 141 MG/DL (ref 74–99)
GLUCOSE SERPL-MCNC: 139 MG/DL (ref 74–99)
HCT VFR BLD AUTO: 28.8 % (ref 34–48)
HGB BLD-MCNC: 8.8 G/DL (ref 11.5–15.5)
IMM GRANULOCYTES # BLD AUTO: <0.03 K/UL (ref 0–0.58)
IMM GRANULOCYTES NFR BLD: 1 % (ref 0–5)
LYMPHOCYTES NFR BLD: 0.9 K/UL (ref 1.5–4)
LYMPHOCYTES RELATIVE PERCENT: 26 % (ref 20–42)
MCH RBC QN AUTO: 34 PG (ref 26–35)
MCHC RBC AUTO-ENTMCNC: 30.6 G/DL (ref 32–34.5)
MCV RBC AUTO: 111.2 FL (ref 80–99.9)
MONOCYTES NFR BLD: 0.4 K/UL (ref 0.1–0.95)
MONOCYTES NFR BLD: 12 % (ref 2–12)
NEUTROPHILS NFR BLD: 57 % (ref 43–80)
NEUTS SEG NFR BLD: 1.98 K/UL (ref 1.8–7.3)
PLATELET # BLD AUTO: 82 K/UL (ref 130–450)
PLATELET CONFIRMATION: NORMAL
PMV BLD AUTO: 10.7 FL (ref 7–12)
POTASSIUM SERPL-SCNC: 3.9 MMOL/L (ref 3.5–5)
RBC # BLD AUTO: 2.59 M/UL (ref 3.5–5.5)
RBC # BLD: ABNORMAL 10*6/UL
SODIUM SERPL-SCNC: 145 MMOL/L (ref 132–146)
WBC OTHER # BLD: 3.5 K/UL (ref 4.5–11.5)

## 2024-10-16 PROCEDURE — 6370000000 HC RX 637 (ALT 250 FOR IP): Performed by: INTERNAL MEDICINE

## 2024-10-16 PROCEDURE — 82962 GLUCOSE BLOOD TEST: CPT

## 2024-10-16 PROCEDURE — 6360000004 HC RX CONTRAST MEDICATION: Performed by: RADIOLOGY

## 2024-10-16 PROCEDURE — 6360000002 HC RX W HCPCS: Performed by: INTERNAL MEDICINE

## 2024-10-16 PROCEDURE — 1200000000 HC SEMI PRIVATE

## 2024-10-16 PROCEDURE — A9579 GAD-BASE MR CONTRAST NOS,1ML: HCPCS | Performed by: RADIOLOGY

## 2024-10-16 PROCEDURE — 99232 SBSQ HOSP IP/OBS MODERATE 35: CPT | Performed by: NURSE PRACTITIONER

## 2024-10-16 PROCEDURE — 6360000002 HC RX W HCPCS

## 2024-10-16 PROCEDURE — 2580000003 HC RX 258: Performed by: INTERNAL MEDICINE

## 2024-10-16 PROCEDURE — 85025 COMPLETE CBC W/AUTO DIFF WBC: CPT

## 2024-10-16 PROCEDURE — 80048 BASIC METABOLIC PNL TOTAL CA: CPT

## 2024-10-16 PROCEDURE — 72156 MRI NECK SPINE W/O & W/DYE: CPT

## 2024-10-16 PROCEDURE — 92526 ORAL FUNCTION THERAPY: CPT

## 2024-10-16 PROCEDURE — 2700000000 HC OXYGEN THERAPY PER DAY

## 2024-10-16 PROCEDURE — 2580000003 HC RX 258

## 2024-10-16 RX ORDER — SODIUM CHLORIDE 9 MG/ML
INJECTION, SOLUTION INTRAVENOUS
Status: DISCONTINUED | OUTPATIENT
Start: 2024-10-16 | End: 2024-10-16 | Stop reason: SDUPTHER

## 2024-10-16 RX ORDER — HYDROXYZINE PAMOATE 50 MG/1
50 CAPSULE ORAL 3 TIMES DAILY PRN
Qty: 21 CAPSULE | Refills: 0 | Status: SHIPPED | OUTPATIENT
Start: 2024-10-16 | End: 2024-10-23

## 2024-10-16 RX ORDER — PROPOFOL 10 MG/ML
INJECTION, EMULSION INTRAVENOUS
Status: DISCONTINUED | OUTPATIENT
Start: 2024-10-16 | End: 2024-10-16 | Stop reason: SDUPTHER

## 2024-10-16 RX ORDER — LANOLIN ALCOHOL/MO/W.PET/CERES
6 CREAM (GRAM) TOPICAL NIGHTLY
Qty: 30 TABLET | Refills: 0 | Status: SHIPPED | OUTPATIENT
Start: 2024-10-16

## 2024-10-16 RX ORDER — FENTANYL CITRATE 50 UG/ML
INJECTION, SOLUTION INTRAMUSCULAR; INTRAVENOUS
Status: DISCONTINUED | OUTPATIENT
Start: 2024-10-16 | End: 2024-10-16 | Stop reason: SDUPTHER

## 2024-10-16 RX ORDER — HYDROCODONE BITARTRATE AND ACETAMINOPHEN 5; 325 MG/1; MG/1
1 TABLET ORAL EVERY 6 HOURS PRN
Qty: 20 TABLET | Refills: 0 | Status: SHIPPED | OUTPATIENT
Start: 2024-10-16 | End: 2024-10-21

## 2024-10-16 RX ORDER — MIDAZOLAM HYDROCHLORIDE 1 MG/ML
INJECTION INTRAMUSCULAR; INTRAVENOUS
Status: DISCONTINUED | OUTPATIENT
Start: 2024-10-16 | End: 2024-10-16 | Stop reason: SDUPTHER

## 2024-10-16 RX ADMIN — LEVETIRACETAM 500 MG: 100 INJECTION INTRAVENOUS at 20:51

## 2024-10-16 RX ADMIN — HYDROXYZINE PAMOATE 50 MG: 25 CAPSULE ORAL at 20:50

## 2024-10-16 RX ADMIN — ANTI-FUNGAL POWDER MICONAZOLE NITRATE TALC FREE: 1.42 POWDER TOPICAL at 01:10

## 2024-10-16 RX ADMIN — LACTULOSE 20 G: 10 SOLUTION ORAL at 10:26

## 2024-10-16 RX ADMIN — MICONAZOLE NITRATE: 20 OINTMENT TOPICAL at 18:22

## 2024-10-16 RX ADMIN — RIFAXIMIN 400 MG: 200 TABLET ORAL at 10:58

## 2024-10-16 RX ADMIN — RIFAXIMIN 400 MG: 200 TABLET ORAL at 20:50

## 2024-10-16 RX ADMIN — PROPOFOL 40 MG: 10 INJECTION, EMULSION INTRAVENOUS at 15:06

## 2024-10-16 RX ADMIN — MEROPENEM 1000 MG: 1 INJECTION INTRAVENOUS at 04:59

## 2024-10-16 RX ADMIN — MORPHINE SULFATE 2 MG: 2 INJECTION, SOLUTION INTRAMUSCULAR; INTRAVENOUS at 04:54

## 2024-10-16 RX ADMIN — PROPOFOL 30 MG: 10 INJECTION, EMULSION INTRAVENOUS at 14:48

## 2024-10-16 RX ADMIN — SODIUM CHLORIDE: 9 INJECTION, SOLUTION INTRAVENOUS at 14:47

## 2024-10-16 RX ADMIN — PROPOFOL 30 MG: 10 INJECTION, EMULSION INTRAVENOUS at 15:00

## 2024-10-16 RX ADMIN — MEROPENEM 1000 MG: 1 INJECTION INTRAVENOUS at 18:18

## 2024-10-16 RX ADMIN — LACTULOSE 20 G: 10 SOLUTION ORAL at 18:19

## 2024-10-16 RX ADMIN — MICONAZOLE NITRATE: 20 OINTMENT TOPICAL at 01:10

## 2024-10-16 RX ADMIN — Medication 6 MG: at 20:51

## 2024-10-16 RX ADMIN — SODIUM CHLORIDE, PRESERVATIVE FREE 10 ML: 5 INJECTION INTRAVENOUS at 01:09

## 2024-10-16 RX ADMIN — MIDAZOLAM 2 MG: 1 INJECTION INTRAMUSCULAR; INTRAVENOUS at 14:48

## 2024-10-16 RX ADMIN — ALLOPURINOL 100 MG: 100 TABLET ORAL at 20:51

## 2024-10-16 RX ADMIN — HEPARIN SODIUM 5000 UNITS: 5000 INJECTION INTRAVENOUS; SUBCUTANEOUS at 18:23

## 2024-10-16 RX ADMIN — GADOTERIDOL 20 ML: 279.3 INJECTION, SOLUTION INTRAVENOUS at 15:27

## 2024-10-16 RX ADMIN — FENTANYL CITRATE 50 MCG: 50 INJECTION INTRAMUSCULAR; INTRAVENOUS at 15:15

## 2024-10-16 RX ADMIN — OXYCODONE HYDROCHLORIDE 5 MG: 5 TABLET ORAL at 00:24

## 2024-10-16 RX ADMIN — PROPOFOL 30 MG: 10 INJECTION, EMULSION INTRAVENOUS at 15:03

## 2024-10-16 RX ADMIN — Medication 500 MG: at 10:26

## 2024-10-16 RX ADMIN — HEPARIN SODIUM 5000 UNITS: 5000 INJECTION INTRAVENOUS; SUBCUTANEOUS at 04:54

## 2024-10-16 RX ADMIN — ANTI-FUNGAL POWDER MICONAZOLE NITRATE TALC FREE: 1.42 POWDER TOPICAL at 18:23

## 2024-10-16 RX ADMIN — ZOLPIDEM TARTRATE 5 MG: 5 TABLET, COATED ORAL at 00:24

## 2024-10-16 RX ADMIN — SODIUM CHLORIDE, PRESERVATIVE FREE 10 ML: 5 INJECTION INTRAVENOUS at 20:57

## 2024-10-16 RX ADMIN — PROPOFOL 30 MG: 10 INJECTION, EMULSION INTRAVENOUS at 14:54

## 2024-10-16 RX ADMIN — HEPARIN SODIUM 5000 UNITS: 5000 INJECTION INTRAVENOUS; SUBCUTANEOUS at 20:51

## 2024-10-16 RX ADMIN — HYDROCODONE BITARTRATE AND ACETAMINOPHEN 1 TABLET: 5; 325 TABLET ORAL at 19:29

## 2024-10-16 RX ADMIN — MORPHINE SULFATE 2 MG: 2 INJECTION, SOLUTION INTRAMUSCULAR; INTRAVENOUS at 12:30

## 2024-10-16 RX ADMIN — MORPHINE SULFATE 2 MG: 2 INJECTION, SOLUTION INTRAMUSCULAR; INTRAVENOUS at 17:54

## 2024-10-16 RX ADMIN — ALLOPURINOL 100 MG: 100 TABLET ORAL at 10:58

## 2024-10-16 RX ADMIN — LEVETIRACETAM 500 MG: 100 INJECTION INTRAVENOUS at 10:26

## 2024-10-16 RX ADMIN — MORPHINE SULFATE 2 MG: 2 INJECTION, SOLUTION INTRAMUSCULAR; INTRAVENOUS at 22:01

## 2024-10-16 RX ADMIN — FENTANYL CITRATE 50 MCG: 50 INJECTION INTRAMUSCULAR; INTRAVENOUS at 15:21

## 2024-10-16 RX ADMIN — RIFAXIMIN 400 MG: 200 TABLET ORAL at 18:19

## 2024-10-16 RX ADMIN — SODIUM CHLORIDE, PRESERVATIVE FREE 10 ML: 5 INJECTION INTRAVENOUS at 10:26

## 2024-10-16 ASSESSMENT — PAIN SCALES - GENERAL
PAINLEVEL_OUTOF10: 0
PAINLEVEL_OUTOF10: 10
PAINLEVEL_OUTOF10: 3
PAINLEVEL_OUTOF10: 10
PAINLEVEL_OUTOF10: 0
PAINLEVEL_OUTOF10: 10
PAINLEVEL_OUTOF10: 9
PAINLEVEL_OUTOF10: 0
PAINLEVEL_OUTOF10: 0

## 2024-10-16 ASSESSMENT — PAIN SCALES - WONG BAKER
WONGBAKER_NUMERICALRESPONSE: NO HURT

## 2024-10-16 ASSESSMENT — PAIN DESCRIPTION - LOCATION
LOCATION: BUTTOCKS
LOCATION: BACK
LOCATION: BACK
LOCATION: BUTTOCKS
LOCATION: BUTTOCKS

## 2024-10-16 ASSESSMENT — PAIN DESCRIPTION - DESCRIPTORS
DESCRIPTORS: ACHING;SORE;DISCOMFORT
DESCRIPTORS: ACHING;SORE;DISCOMFORT

## 2024-10-16 NOTE — ANESTHESIA PRE PROCEDURE
noncompliant,   decreased breath sounds: bilateral                              PE comment: 3 L NC Cardiovascular:  Exercise tolerance: poor (<4 METS)  (+) dysrhythmias: atrial fibrillation        Rhythm: regular  Rate: normal                    Neuro/Psych:   (+) seizures (currently on seizure percaution):, psychiatric history:             ROS comment: Arm weakness  GI/Hepatic/Renal:   (+) liver disease:          Endo/Other:    (+) Diabetesusing insulin.                 Abdominal:             Vascular: negative vascular ROS.         Other Findings:         Anesthesia Plan      MAC     ASA 3             Anesthetic plan and risks discussed with spouse.      Plan discussed with attending.          Phone consent obtained from  olimpia Cunhasue Mendez, APRN - CRNA   10/16/2024

## 2024-10-16 NOTE — ANESTHESIA POSTPROCEDURE EVALUATION
Department of Anesthesiology  Postprocedure Note    Patient: Lissa Diggs  MRN: [id number]  YOB: 1965  Date of evaluation: 10/16/2024    Procedure Summary       Date: 10/16/24 Room / Location: [facility]    Anesthesia Start: 1445 Anesthesia Stop: 1542    Procedure: MRI CERVICAL SPINE W WO CONTRAST Diagnosis: (edema in cervical paraspinals; b/l arm weakness- needs under anesthesia)    Scheduled Providers: Paulina Richter APRN - CRNA Responsible Provider: Heather Alvarez MD    Anesthesia Type: MAC ASA Status: 3            Anesthesia Type: MAC    Armando Phase I: Armando Score: 7    Armando Phase II:      Anesthesia Post Evaluation    Patient location during evaluation: PACU  Patient participation: complete - patient participated  Level of consciousness: awake  Airway patency: patent  Nausea & Vomiting: no nausea and no vomiting  Cardiovascular status: hemodynamically stable  Respiratory status: acceptable  Hydration status: euvolemic  Pain management: adequate        No notable events documented.

## 2024-10-16 NOTE — DISCHARGE INSTR - COC
(Comment) 10/16/24 1248   Drainage Amount Small (< 25%) 10/14/24 2213   Drainage Description Serosanguinous 10/09/24 1556   Odor None 10/09/24 1556   Becky-wound Assessment Other (Comment) 10/15/24 0805   Wound Thickness Description not for Pressure Injury Partial thickness 10/08/24 0940   Number of days: 11       Wound 10/08/24 Abdomen Right;Lower (Active)   Wound Image   10/08/24 0940   Dressing Status Intact 10/16/24 1248   Wound Cleansed Soap and water 10/15/24 1453   Dressing/Treatment Alginate 10/09/24 1556   Dressing Change Due 10/16/24 10/15/24 1453   Wound Length (cm) 4 cm 10/09/24 1556   Wound Width (cm) 1.2 cm 10/09/24 1556   Wound Depth (cm) 0.4 cm 10/09/24 1556   Wound Surface Area (cm^2) 4.8 cm^2 10/09/24 1556   Change in Wound Size % (l*w) -20 10/09/24 1556   Wound Volume (cm^3) 1.92 cm^3 10/09/24 1556   Wound Healing % -380 10/09/24 1556   Wound Assessment Other (Comment) 10/16/24 1248   Drainage Amount Small (< 25%) 10/14/24 2213   Drainage Description Serosanguinous 10/09/24 1556   Odor None 10/09/24 1556   Becky-wound Assessment Other (Comment) 10/15/24 0805   Number of days: 8       Wound 10/08/24 Buttocks Right (Active)   Wound Image   10/08/24 0940   Wound Etiology Pressure Stage 2 10/08/24 0940   Dressing Status Intact 10/16/24 1248   Wound Cleansed Soap and water 10/15/24 1453   Dressing/Treatment Pharmaceutical agent (see MAR) 10/09/24 1556   Dressing Change Due 10/16/24 10/15/24 1453   Wound Length (cm) 9 cm 10/09/24 1556   Wound Width (cm) 6 cm 10/09/24 1556   Wound Depth (cm) 0.1 cm 10/09/24 1556   Wound Surface Area (cm^2) 54 cm^2 10/09/24 1556   Change in Wound Size % (l*w) 0 10/09/24 1556   Wound Volume (cm^3) 5.4 cm^3 10/09/24 1556   Wound Healing % 0 10/09/24 1556   Wound Assessment Other (Comment) 10/16/24 1248   Drainage Amount Small (< 25%) 10/14/24 2213   Drainage Description Serosanguinous 10/09/24 1556   Odor None 10/09/24 1556   Becky-wound Assessment Other (Comment) 10/15/24

## 2024-10-16 NOTE — PROGRESS NOTES
HOSPITALIST PROGRESS NOTES     ADMITTING DATE AND TIME: 10/4/2024  6:16 PM  had no chief complaint listed for this encounter.    ADMIT DX: Acute delirium [R41.0]  Seizure-like activity (HCC) [R56.9]      SUBJECTIVE:  Patient is being followed for Acute delirium [R41.0]  Seizure-like activity (HCC) [R56.9]     Feeling okay somnolence answering some questions family at bedside all questions answered  No CP or SOB  No fever or chills   No uncontrolled pain  No vomiting or diarrhea   No events reported overnight  Chart reviewed      collagenase   Topical Daily    ascorbic acid  500 mg Oral Daily    miconazole   Topical BID    miconazole nitrate   Topical BID    piperacillin-tazobactam  4,500 mg IntraVENous Q8H    [Held by provider] vancomycin  1,250 mg IntraVENous Q12H    levETIRAcetam  500 mg IntraVENous Q12H    sodium chloride flush  5-40 mL IntraVENous 2 times per day    melatonin  3 mg Oral Nightly    [Held by provider] heparin (porcine)  5,000 Units SubCUTAneous 3 times per day    insulin lispro  0-8 Units SubCUTAneous TID WC    insulin lispro  0-4 Units SubCUTAneous Nightly     ipratropium 0.5 mg-albuterol 2.5 mg, 1 Dose, Q4H PRN  miconazole nitrate, , 4x Daily PRN  LORazepam, 1 mg, Once PRN  sodium chloride, , PRN  sodium chloride, , PRN  oxyCODONE, 5 mg, Q4H PRN  sodium chloride, , PRN  LORazepam, 4 mg, Q5 Min PRN  sodium chloride flush, 5-40 mL, PRN  sodium chloride, , PRN  potassium chloride, 40 mEq, PRN   Or  potassium alternative oral replacement, 40 mEq, PRN   Or  potassium chloride, 10 mEq, PRN  magnesium sulfate, 2,000 mg, PRN  ondansetron, 4 mg, Q8H PRN   Or  ondansetron, 4 mg, Q6H PRN  polyethylene glycol, 17 g, Daily PRN  acetaminophen, 650 mg, Q6H PRN   Or  acetaminophen, 650 mg, Q6H PRN  aluminum & magnesium hydroxide-simethicone, 30 mL, Q6H PRN  glucose, 4 tablet, 
                                                                                                                                HOSPITALIST PROGRESS NOTES     ADMITTING DATE AND TIME: 10/4/2024  6:16 PM  had no chief complaint listed for this encounter.    ADMIT DX: Acute delirium [R41.0]  Seizure-like activity (HCC) [R56.9]      SUBJECTIVE:  Patient is being followed for Acute delirium [R41.0]  Seizure-like activity (HCC) [R56.9]     Feeling okay somnolence answering some questions family at bedside all questions answered  No CP or SOB  No fever or chills   No uncontrolled pain  No vomiting or diarrhea   No events reported overnight  Chart reviewed      collagenase   Topical Daily    meropenem  1,000 mg IntraVENous Q8H    ascorbic acid  500 mg Oral Daily    miconazole   Topical BID    miconazole nitrate   Topical BID    levETIRAcetam  500 mg IntraVENous Q12H    sodium chloride flush  5-40 mL IntraVENous 2 times per day    melatonin  3 mg Oral Nightly    [Held by provider] heparin (porcine)  5,000 Units SubCUTAneous 3 times per day    insulin lispro  0-8 Units SubCUTAneous TID WC    insulin lispro  0-4 Units SubCUTAneous Nightly     ipratropium 0.5 mg-albuterol 2.5 mg, 1 Dose, Q4H PRN  miconazole nitrate, , 4x Daily PRN  sodium chloride, , PRN  sodium chloride, , PRN  oxyCODONE, 5 mg, Q4H PRN  sodium chloride, , PRN  LORazepam, 4 mg, Q5 Min PRN  sodium chloride flush, 5-40 mL, PRN  sodium chloride, , PRN  potassium chloride, 40 mEq, PRN   Or  potassium alternative oral replacement, 40 mEq, PRN   Or  potassium chloride, 10 mEq, PRN  magnesium sulfate, 2,000 mg, PRN  ondansetron, 4 mg, Q8H PRN   Or  ondansetron, 4 mg, Q6H PRN  polyethylene glycol, 17 g, Daily PRN  acetaminophen, 650 mg, Q6H PRN   Or  acetaminophen, 650 mg, Q6H PRN  aluminum & magnesium hydroxide-simethicone, 30 mL, Q6H PRN  glucose, 4 tablet, PRN  dextrose bolus, 125 mL, PRN   Or  dextrose bolus, 250 mL, PRN  glucagon (rDNA), 1 mg, PRN  dextrose, , 
                                                                                                                                HOSPITALIST PROGRESS NOTES     ADMITTING DATE AND TIME: 10/4/2024  6:16 PM  had no chief complaint listed for this encounter.    ADMIT DX: Acute delirium [R41.0]  Seizure-like activity (HCC) [R56.9]      SUBJECTIVE:  Patient is being followed for Acute delirium [R41.0]  Seizure-like activity (HCC) [R56.9]     Patient was seen examined and evaluated  Recent lab results, charts and pertinent diagnostic imaging reviewed   Ancillary service notes reviewed   Lethargic   Discussed with her  and daughter      Care reviewed with nursing staff, medical and surgical specialty care, primary care and the patient's family as available.   ROS: denies fever, chills, cp, sob, n/v, HA unless stated above.      miconazole   Topical BID    miconazole nitrate   Topical BID    levETIRAcetam  500 mg IntraVENous Q12H    sodium chloride flush  5-40 mL IntraVENous 2 times per day    melatonin  3 mg Oral Nightly    heparin (porcine)  5,000 Units SubCUTAneous 3 times per day    insulin lispro  0-8 Units SubCUTAneous TID WC    insulin lispro  0-4 Units SubCUTAneous Nightly     white petrolatum, , PRN  LORazepam, 4 mg, Q5 Min PRN  sodium chloride flush, 5-40 mL, PRN  sodium chloride, , PRN  potassium chloride, 40 mEq, PRN   Or  potassium alternative oral replacement, 40 mEq, PRN   Or  potassium chloride, 10 mEq, PRN  magnesium sulfate, 2,000 mg, PRN  ondansetron, 4 mg, Q8H PRN   Or  ondansetron, 4 mg, Q6H PRN  polyethylene glycol, 17 g, Daily PRN  acetaminophen, 650 mg, Q6H PRN   Or  acetaminophen, 650 mg, Q6H PRN  aluminum & magnesium hydroxide-simethicone, 30 mL, Q6H PRN  glucose, 4 tablet, PRN  dextrose bolus, 125 mL, PRN   Or  dextrose bolus, 250 mL, PRN  glucagon (rDNA), 1 mg, PRN  dextrose, , Continuous PRN         OBJECTIVE:    BP (!) 126/58   Pulse 96   Temp 97.9 °F (36.6 °C) (Oral)   Resp 12   Wt 
       [facility] Hospitalist Progress Note    Admitting Date and Time: 10/4/2024  6:16 PM  Admit Dx: Acute delirium [R41.0]  Seizure-like activity (HCC) [R56.9]    Synopsis: 59 y.o. year old female with past medical history of hepatic cirrhosis on rifaximin, insulin-dependent diabetes mellitus, morbid obesity, bedbound, recurrent skin abscesses with history of MRSA, MSSA and enterococci, Pseudomonas was brought to the ER by  from nursing home due to altered mental status.  Was initially brought to [facility].   at bedside states that she had fall 3 months ago and her mentation has deteriorated since then.  Patient has history of multiple wounds, had sacral wound debridement with general surgery in September, was being treated for UTI at [facility].  Has history of Pseudomonas in urine culture. Decision was made to transfer patient to [facility] main [facility] for neurology evaluation for altered mental status and possible seizure-like activity.  MRI of cervical spine under anesthesia on Monday    Subjective:  Patient seen and examined at bedside  Feeling fatigued and tired, having pain all over  Awaiting MRI of the cervical spine)    ROS: denies fever, chills, cp, sob, n/v, HA unless stated above.      allopurinol  100 mg Oral BID    sodium chloride flush  5-40 mL IntraVENous 2 times per day    lidocaine 1 % injection  50 mg IntraDERmal Once    lactulose  20 g Oral TID    rifAXIMin  400 mg Oral TID    collagenase   Topical Daily    meropenem  1,000 mg IntraVENous Q8H    ascorbic acid  500 mg Oral Daily    miconazole   Topical BID    miconazole nitrate   Topical BID    levETIRAcetam  500 mg IntraVENous Q12H    melatonin  3 mg Oral Nightly    [Held by provider] heparin (porcine)  5,000 Units SubCUTAneous 3 times per day    insulin lispro  0-8 Units SubCUTAneous TID     insulin lispro  0-4 Units SubCUTAneous Nightly     sodium chloride flush, 5-40 mL, 
       [facility] Hospitalist Progress Note    Admitting Date and Time: 10/4/2024  6:16 PM  Admit Dx: Acute delirium [R41.0]  Seizure-like activity (HCC) [R56.9]    Synopsis: 59 y.o. year old female with past medical history of hepatic cirrhosis on rifaximin, insulin-dependent diabetes mellitus, morbid obesity, bedbound, recurrent skin abscesses with history of MRSA, MSSA and enterococci, Pseudomonas was brought to the ER by  from nursing home due to altered mental status.  Was initially brought to [facility].   at bedside states that she had fall 3 months ago and her mentation has deteriorated since then.  Patient has history of multiple wounds, had sacral wound debridement with general surgery in September, was being treated for UTI at [facility].  Has history of Pseudomonas in urine culture. Decision was made to transfer patient to [facility] main [facility] for neurology evaluation for altered mental status and possible seizure-like activity.  MRI of cervical spine under anesthesia on Monday    Subjective:  Patient seen and examined at bedside  Continues to have pain all over  Awaiting MRI of the cervical spine    ROS: denies fever, chills, cp, sob, n/v, HA unless stated above.      allopurinol  100 mg Oral BID    sodium chloride flush  5-40 mL IntraVENous 2 times per day    lidocaine 1 % injection  50 mg IntraDERmal Once    lactulose  20 g Oral TID    rifAXIMin  400 mg Oral TID    collagenase   Topical Daily    meropenem  1,000 mg IntraVENous Q8H    ascorbic acid  500 mg Oral Daily    miconazole   Topical BID    miconazole nitrate   Topical BID    levETIRAcetam  500 mg IntraVENous Q12H    melatonin  3 mg Oral Nightly    heparin (porcine)  5,000 Units SubCUTAneous 3 times per day    insulin lispro  0-8 Units SubCUTAneous TID WC    insulin lispro  0-4 Units SubCUTAneous Nightly     HYDROcodone 5 mg - acetaminophen, 1 tablet, Q4H PRN  hydrOXYzine pamoate, 50 
    Patient was not in room this morning and I spoke with her  who was at bedside.  He notes his concerns regarding change in patient mental status.      Came back later and pt was back from her EEG.  Advised we will await testing and review.  Addressed questions and concerns.   
  [facility] Quality Flow/Interdisciplinary Rounds Progress Note        Quality Flow Rounds held on October 8, 2024    Disciplines Attending:  Bedside Nurse, , , and Nursing Unit Leadership    Lissa Diggs was admitted on 10/4/2024  6:16 PM    Anticipated Discharge Date:       Disposition:    Rylan Score:  Rylan Scale Score: 11    Readmission Risk              Risk of Unplanned Readmission:  9           Discussed patient goal for the day, patient clinical progression, and barriers to discharge.  The following Goal(s) of the Day/Commitment(s) have been identified:  Diagnostics - Report Results and Labs - Report Results      Gail Sparks RN  October 8, 2024        
  Palliative Care Department  [phone]  Progress Note  Provider CHIQUIS Peres - CNS      PATIENT: Lissa Diggs  : 1965  MRN: [id number]  ADMISSION DATE: 10/4/2024  6:16 PM  Referring Provider: Wilfrido Haque MD     Palliative Medicine was consulted on hospital day 1 for assistance with Goals of care, Code Status Discussion, Family support, Symptom management     HPI:     Clinical Summary:Lissa Diggs is a 59 y.o. y/o female with a history of liver cirrhosis, diabetes, morbid obesity, bed bound, recurrent skin abscesses who presented to [facility] on 10/4/2024 with altered mental status.  Neurology was consulted for possible seizure-like activity.  She was admitted to telemetry for further medical management    ASSESSMENT/PLAN:     Pertinent Hospital Diagnoses   Altered mental status  MOYA cirrhosis  Bed bound  Chronic wounds    Palliative Care Encounter / Counseling Regarding Goals of Care  Please see detailed goals of care discussion as below  At this time, Lissa Diggs, Does Not have capacity for medical decision-making.  Capacity is time limited and situation/question specific  During encounter Olimpia was surrogate medical decision-maker  Outcome of goals of care meeting:  Continue all current medical management  Continue full code  Considering options for code status; reviewed options in detail and provided code status brochure to read  Code status Full Code  Advanced Directives: no POA or living will in epic  Surrogate/Legal NOK:   Primary Decision Maker: olimpia diggs - Spouse - [phone]    Secondary Decision Maker: Nya Partida - Child - [phone]    Spiritual assessment: no spiritual distress identified  Bereavement and grief: to be determined  Referrals to: none today      SUBJECTIVE:     Details of Conversation:   10/8/24 Chart reviewed. Pt seen at bedside.  Pt is sleeping soundly. Was transferred to  for isolation. ESBL Klebsiella UTI and multiple wounds with MRSA, MSSA 
  Palliative Care Department  [phone]  Progress Note  Provider Dilcia Feldman, APRN - CNP      PATIENT: Lissa Diggs  : 1965  MRN: [id number]  ADMISSION DATE: 10/4/2024  6:16 PM  Referring Provider: Wilfrido Haque MD     Palliative Medicine was consulted on hospital day 1 for assistance with Goals of care, Code Status Discussion, Family support, Symptom management     HPI:     Clinical Summary:Lissa Diggs is a 59 y.o. y/o female with a history of liver cirrhosis, diabetes, morbid obesity, bed bound, recurrent skin abscesses who presented to [facility] on 10/4/2024 with altered mental status.  Neurology was consulted for possible seizure-like activity.  She was admitted to telemetry for further medical management    ASSESSMENT/PLAN:     Pertinent Hospital Diagnoses   Altered mental status  MOYA cirrhosis  Bed bound  Chronic wounds    Palliative Care Encounter / Counseling Regarding Goals of Care  Please see detailed goals of care discussion as below  At this time, Lissa Diggs, Does Not have capacity for medical decision-making.  Capacity is time limited and situation/question specific  During encounter Olimpia was surrogate medical decision-maker  Outcome of goals of care meeting:  Continue all current medical management  Continue full code  Considering options for code status; reviewed options in detail and provided code status brochure to read  Code status Full Code  Advanced Directives: no POA or living will in epic  Surrogate/Legal NOK:   Primary Decision Maker: olimpia diggs - Spouse - [phone]    Secondary Decision Maker: Nya Partida - Child - [phone]    Spiritual assessment: no spiritual distress identified  Bereavement and grief: to be determined  Referrals to: none today      SUBJECTIVE:     Details of Conversation:     Chart reviewed.  Patient seen at the bedside, she looks ill, weak, deconditioned, waiting to go for an MRI today.  Got update from bedside nurse.   
  Physician Progress Note      PATIENT:               UCHE ZAVALA  CSN #:                  [id number]  :                       1965  ADMIT DATE:       10/4/2024 6:16 PM  DISCH DATE:  RESPONDING  PROVIDER #:        Yasemin Cerda MD          QUERY TEXT:    Dear Attending Physician,    Pt admitted with UTI.  Pt noted to have a chronic indwelling urinary catheter.    If possible, please document in the progress notes and discharge summary if   you are evaluating and/or treating any of the following:    The medical record reflects the following:  Risk Factors: UTI with chronic medrano catheter, bedridden  Clinical Indicators: Per ICU 10/6,\"... Gram-negative rods UTI...Altered mental   status, rule out seizures...\"  10/6 URINE CULTURES \"GRAM NEGATIVE RODS   >100,000 CFU/ML  Treatment: IV ATB    Thank you,  Antonia Rushing RN CCDS  Clinical Documentation Improvement Specialist  Phone# [phone]  Options provided:  -- UTI due to chronic indwelling urinary catheter  -- UTI not due to indwelling urinary catheter  -- Other - I will add my own diagnosis  -- Disagree - Not applicable / Not valid  -- Disagree - Clinically unable to determine / Unknown  -- Refer to Clinical Documentation Reviewer    PROVIDER RESPONSE TEXT:    UTI is due to the chronic indwelling urinary catheter.    Query created by: Antonia Rushing on 10/7/2024 4:40 PM      QUERY TEXT:    Dear Attending Physician,    Pt admitted with UTI.  Per Wound Care RN 10/8,\"... Stage 3 Decubitus Pressure   Ulcers to coccyx, right hip and left buttocks present on admission...\" If   possible, please document in the progress notes and discharge summary if you   are evaluating and/or treating any of the following:    The medical record reflects the following:  Risk Factors: UTI with chronic medrano catheter, bedridden  Clinical Indicators: Per Wound Care RN 10/8,\"..Stage 3 Decubitus Pressure   Ulcers to coccyx, right hip and left buttocks present on admissio. 
 Lissa Diggs is a 59 y.o.  female     Neurology following for seizure-like activity    PMH of diabetes, cirrhosis of the liver, skin abscesses, history of MRSA, MSSA and enterococci, Pseudomonas, and morbid obesity    Assessment:     Metabolic encephalopathy secondary to UTI  Progressive cognitive decline over the past 6 months.  History of liver cirrhosis, bedbound with recurrent skin infections.  Wound cultures positive for gram-negative rods, Proteus, strep nonhemolytic streptococci    Concern for seizure activity  Unclear as with his seizure activity was.  No astrocytic/myoclonic type of movements noted  EEG negative for seizures    Plan:     MRI cervical spine under anesthesia  Continue Keppra  Neurology will follow after MRI C-spine complete  Call if needed sooner    History of Present Illness:     According to the patient's  she has been having progressive decline in her mentation over the last 5-6 months.  Patient has been residing in a nursing home as of November 2023. However, according to her  there has been a progressive decline in functioning ever since falling out of her wheelchair and hitting her head.  She has also progressively lost mobility and is now bedbound since the beginning of this year.  She is also developed flexion contractures in bilateral hands with loss motor dexterity in her hands.  She can still move her arm and shoulder but seemed to be weak distally in both arms.  Patient was able to assist with some of her ADLs both her ADL function is now significantly diminished as well as decline in her cognitive functioning as well.   is also noted that recently she has been having tremors in her shoulders with eyes rolled back in her head and decreased responsiveness.  Patient remains extremely lethargic most of the time.  Tremor activity noted in the ED and patient given Keppra load and has been started on Keppra 500 mg twice daily.  Some minor shaking observed since 
 Lissa Diggs is a 59 y.o.  female     Neurology following for seizure-like activity    PMH of diabetes, cirrhosis of the liver, skin abscesses, history of MRSA, MSSA and enterococci, Pseudomonas, and morbid obesity    Assessment:     Metabolic encephalopathy secondary to UTI  Progressive cognitive decline over the past 6 months.  History of liver cirrhosis, bedbound with recurrent skin infections.  Wound cultures positive for gram-negative rods, Proteus, strep nonhemolytic streptococci    Concern for seizure activity  Unclear as with his seizure activity was.  No astrocytic/myoclonic type of movements noted  EEG negative for seizures    Plan:     MRI cervical spine under anesthesia should be done today  Continue Keppra  Neurology will sign off after MRI C-spine complete  Call if needed sooner    History of Present Illness:     According to the patient's  she has been having progressive decline in her mentation over the last 5-6 months.  Patient has been residing in a nursing home as of November 2023. However, according to her  there has been a progressive decline in functioning ever since falling out of her wheelchair and hitting her head.  She has also progressively lost mobility and is now bedbound since the beginning of this year.  She is also developed flexion contractures in bilateral hands with loss motor dexterity in her hands.  She can still move her arm and shoulder but seemed to be weak distally in both arms.  Patient was able to assist with some of her ADLs both her ADL function is now significantly diminished as well as decline in her cognitive functioning as well.   is also noted that recently she has been having tremors in her shoulders with eyes rolled back in her head and decreased responsiveness.  Patient remains extremely lethargic most of the time.  Tremor activity noted in the ED and patient given Keppra load and has been started on Keppra 500 mg twice daily.  Some minor 
10/8- Went to send for patient at 6:15am and RN stated patient not stable to come down-having respiratory issues. Will try again later today.  
4 Eyes Skin Assessment     NAME:  Lissa Diggs  YOB: 1965  MEDICAL RECORD NUMBER:  [id number]    The patient is being assessed for  Transfer to New Unit    I agree that at least one RN has performed a thorough Head to Toe Skin Assessment on the patient. ALL assessment sites listed below have been assessed.      Areas assessed by both nurses:    Head, Face, Ears, Shoulders, Back, Chest, Arms, Elbows, Hands, Sacrum. Buttock, Coccyx, Ischium, Legs. Feet and Heels, and Under Medical Devices         Does the Patient have a Wound? Yes wound(s) were present on assessment. LDA wound assessment was Initiated and completed by RN       Rylan Prevention initiated by RN: Yes  Wound Care Orders initiated by RN: Yes    Pressure Injury (Stage 3,4, Unstageable, DTI, NWPT, and Complex wounds) if present, place Wound referral order by RN under : Yes    New Ostomies, if present place, Ostomy referral order under : No     Nurse 1 eSignature: Electronically signed by Yelena Jensen RN on 10/8/24 at 12:14 PM EDT    **SHARE this note so that the co-signing nurse can place an eSignature**    Nurse 2 eSignature: Electronically signed by Yi York RN on 10/8/24 at 6:26 PM EDT    
4 Eyes Skin Assessment     NAME:  Lissa Diggs  YOB: 1965  MEDICAL RECORD NUMBER:  [id number]    The patient is being assessed for  Admission    I agree that at least one RN has performed a thorough Head to Toe Skin Assessment on the patient. ALL assessment sites listed below have been assessed.      Areas assessed by both nurses:    Head, Face, Ears, Shoulders, Back, Chest, Arms, Elbows, Hands, Sacrum. Buttock, Coccyx, Ischium, Legs. Feet and Heels, and Under Medical Devices         Does the Patient have a Wound? Yes wound(s) were present on assessment. LDA wound assessment was Initiated and completed by RN       Rylan Prevention initiated by RN: Yes  Wound Care Orders initiated by RN: Yes    Pressure Injury (Stage 3,4, Unstageable, DTI, NWPT, and Complex wounds) if present, place Wound referral order by RN under : Yes    New Ostomies, if present place, Ostomy referral order under : No     Nurse 1 eSignature: Electronically signed by Heriberto Jansen RN on 10/5/24 at 2:09 AM EDT    **SHARE this note so that the co-signing nurse can place an eSignature**    Nurse 2 eSignature: Electronically signed by Zain Khalil RN on 10/5/24 at 2:10 AM EDT  
Attempted to call [facility] skilled nursing facility to receive pt med list, unable to get through to staff at this time  
Attempted to call [facility] x3 with no answer.  
Attempted to scan patient. Patient refused once in the scanner.   
Called bryan to give nurse to nurse no one answered will try again later.  
Called floor for MRI screening to be done.  
Chart reviewed.  Patient off the unit for testing.  Palliative medicine will continue to follow.  
Comprehensive Nutrition Assessment    Type and Reason for Visit:  Initial, Positive Nutrition Screen    Nutrition Recommendations/Plan:   Continue NPO, Modify Tube Feeding due to hyperglycemia/hx DM    Diabetic @ 60 ml/hr to provide 1440 ml tv, 2160 kcals, 119 gm pro, 1093 ml free water. Recommend Flush 160 ml water q 4 hrs to provide 2053 ml total fluids       Malnutrition Assessment:  Malnutrition Status:  At risk for malnutrition (Comment) (10/06/24 8548)    Context:  Acute Illness     Findings of the 6 clinical characteristics of malnutrition:  Energy Intake:  Mild decrease in energy intake (Comment) (since admit - unable to assess intakes pta at this time)  Weight Loss:  Unable to assess (no wt hx per EMR)     Body Fat Loss:  No significant body fat loss     Muscle Mass Loss:  No significant muscle mass loss    Fluid Accumulation:  No significant fluid accumulation     Strength:  Not Performed    Nutrition Assessment:    Pt admit w/ AMS & concern for seizure w/ noted multiple infected wounds & UTI. Noted s/p fall 3 mo ago w/ worsening AMS since. PMHx DM, MOYA Cirrhosis, recurrent skin abscesses, bedbound. Pt w/ NGT in place & EN support initiated. Will provide updated TF recs and continue to monitor.    Nutrition Related Findings:    pt disoriented x3, NGT w/ TF, hyperactive BS, abd distention, diarrhea, +2-3 pitting edema, fluids WDL, hyperglycemia Wound Type: Multiple, Open Wounds, Wound Consult Pending (wounds x7)       Current Nutrition Intake & Therapies:    Average Meal Intake: NPO     ADULT DIET; Regular; 4 carb choices (60 gm/meal)  ADULT TUBE FEEDING; Nasogastric; Standard with Fiber; Continuous; 10; Yes; 10; Q 4 hours; 60; 30; Q 4 hours  Current Tube Feeding (TF) Orders:  Feeding Route: Nasogastric  Formula: Standard with Fiber  Schedule: Continuous  Feeding Regimen: 60 ml/hr, running at 30 ml/hr  Water Flushes: 30 ml q 4 hrs = 180 ml water  Goal TF & Flush Orders Provides: 1440 ml tv, 2160 kcals, 
Comprehensive Nutrition Assessment    Type and Reason for Visit:  Reassess (TF rec)    Nutrition Recommendations/Plan:   Pt NPO awaiting MRI ; will provide recommendations as appropriate w/ diet advancement; hx of dysphagia noted  Hx of EN support via NGT, TF rec provided if needed;     Diabetic (Glucerna 1.5) @ 60 ml/hr to provide 1440 ml tv, 2160 kcals, 119gm pro, 1092 ml free water.    Will monitor.     Malnutrition Assessment:  Malnutrition Status:  At risk for malnutrition (Comment) (10/06/24 3794)    Context:  Acute Illness     Findings of the 6 clinical characteristics of malnutrition:  Energy Intake:  Mild decrease in energy intake (Comment) (since admit - unable to assess intakes pta at this time)  Weight Loss:  Unable to assess (no wt hx per EMR)     Body Fat Loss:  No significant body fat loss     Muscle Mass Loss:  No significant muscle mass loss    Fluid Accumulation:  No significant fluid accumulation     Strength:  Not Performed    Nutrition Assessment:    Pt admit w/ AMS & seizure activity w/ multiple pressure injuries/wounds & UTI. Noted s/p fall 3 mo ago w/ worsening AMS since. PMHx DM, MOYA cirrhosis, recurrent skin abcesses, bedbound status; pt s/p SLP eval 10/9, recommend pureed solids/pudding thick liquids; pt s/p PICC; pt NPO awaiting MRI; pt was w/ EN support via NGT-  TF recs provided; will start ONS as appropriate w/ initiation of oral diet; will monitor.    Nutrition Related Findings:    K WNL; elevated BUN/Cr; hyperglycemia; responds to voice; oriented x 4 (oriented/disoriented at times); active BS; diarrhea; +2 edema; I/O WNL Wound Type: Multiple, Stage III, Stage II, Open Wounds, Surgical Incision, Diabetic Ulcer       Current Nutrition Intake & Therapies:    Average Meal Intake: NPO  Average Supplements Intake: NPO  Diet NPO    Anthropometric Measures:  Height: 162.6 cm (5' 4.02\")  Ideal Body Weight (IBW): 120 lbs (55 kg)    Admission Body Weight: 120.2 kg (265 lb) (10/4 bed 
Consult sent to ID: dr duque taking call  
Contacted Dr. Cerda via [employer] to clarify if patient would benefit from a barium swallow.  
Dr Creda messaged via perfect serve regarding if the patient's medrano can be removed and external catheter placed.  
Dr Sigifredo Fontanez messaged via perfect serve to check if patient can be downgraded to a general floor.  
EEG completed. Report to follow.  Laila Lomax 10/7/2024     
[facility]  [facility]  Progress Note      No chief complaint on file.      SUBJECTIVE:    Patient is tolerating medications. No reported adverse drug reactions.  No nausea, vomiting.  Resting in bed. Family present. No new complaints or concerns.     Review of systems:  As stated above in the chief complaint, otherwise negative.    Medications:  Scheduled Meds:   melatonin  6 mg Oral Nightly    allopurinol  100 mg Oral BID    sodium chloride flush  5-40 mL IntraVENous 2 times per day    lidocaine 1 % injection  50 mg IntraDERmal Once    lactulose  20 g Oral TID    rifAXIMin  400 mg Oral TID    collagenase   Topical Daily    meropenem  1,000 mg IntraVENous Q8H    ascorbic acid  500 mg Oral Daily    miconazole   Topical BID    miconazole nitrate   Topical BID    levETIRAcetam  500 mg IntraVENous Q12H    heparin (porcine)  5,000 Units SubCUTAneous 3 times per day    insulin lispro  0-8 Units SubCUTAneous TID WC    insulin lispro  0-4 Units SubCUTAneous Nightly     Continuous Infusions:   sodium chloride      sodium chloride      sodium chloride      sodium chloride      dextrose       PRN Meds:morphine, zolpidem, HYDROcodone 5 mg - acetaminophen, hydrOXYzine pamoate, sodium chloride flush, sodium chloride, ipratropium 0.5 mg-albuterol 2.5 mg, miconazole nitrate, sodium chloride, sodium chloride, oxyCODONE, sodium chloride, LORazepam, potassium chloride **OR** potassium alternative oral replacement **OR** potassium chloride, magnesium sulfate, ondansetron **OR** ondansetron, polyethylene glycol, acetaminophen **OR** acetaminophen, aluminum & magnesium hydroxide-simethicone, glucose, dextrose bolus **OR** dextrose bolus, glucagon (rDNA), dextrose    OBJECTIVE:  BP (!) 118/56   Pulse 93   Temp 97.7 °F (36.5 °C) (Temporal)   Resp 20   Ht 1.626 m (5' 4.02\")   Wt 118.8 kg (262 lb)   SpO2 97%   BMI 44.95 kg/m²   Temp  Av.2 °F (36.2 °C)  Min: 96.8 °F (36 °C)  Max: 97.7 °F (36.5 
Gave Nurse to Nurse report to [facility] for room 1169. Kyle Mina RN    
Lab attempt x1 at 0200 to get pt's post transfusion hemoglobin and hematocrit. RN called and requested another person try, but that attempt was unsuccessful as well. Unable to obtain hemoglobin and hematocrit at this time.  
Made attending aware of positive stool occult    Sarah Bermeo RN    
Made attending aware of pt platelets of 53 this morning     Sarah Bermeo RN     
[facility]  [facility]  Progress Note      No chief complaint on file.      SUBJECTIVE:    Patient is tolerating medications. No reported adverse drug reactions.  No nausea, vomiting, diarrhea.    Review of systems:  As stated above in the chief complaint, otherwise negative.    Medications:  Scheduled Meds:   collagenase   Topical Daily    ascorbic acid  500 mg Oral Daily    miconazole   Topical BID    miconazole nitrate   Topical BID    piperacillin-tazobactam  4,500 mg IntraVENous Q8H    [Held by provider] vancomycin  1,250 mg IntraVENous Q12H    levETIRAcetam  500 mg IntraVENous Q12H    sodium chloride flush  5-40 mL IntraVENous 2 times per day    melatonin  3 mg Oral Nightly    [Held by provider] heparin (porcine)  5,000 Units SubCUTAneous 3 times per day    insulin lispro  0-8 Units SubCUTAneous TID WC    insulin lispro  0-4 Units SubCUTAneous Nightly     Continuous Infusions:   sodium chloride      sodium chloride      sodium chloride      sodium chloride      dextrose       PRN Meds:ipratropium 0.5 mg-albuterol 2.5 mg, miconazole nitrate, sodium chloride, sodium chloride, oxyCODONE, sodium chloride, LORazepam, sodium chloride flush, sodium chloride, potassium chloride **OR** potassium alternative oral replacement **OR** potassium chloride, magnesium sulfate, ondansetron **OR** ondansetron, polyethylene glycol, acetaminophen **OR** acetaminophen, aluminum & magnesium hydroxide-simethicone, glucose, dextrose bolus **OR** dextrose bolus, glucagon (rDNA), dextrose    OBJECTIVE:  /75   Pulse 97   Temp 97.1 °F (36.2 °C) (Tympanic)   Resp 20   Ht 1.626 m (5' 4\")   Wt 123 kg (271 lb 3.2 oz)   SpO2 99%   BMI 46.55 kg/m²   Temp  Av.7 °F (36.5 °C)  Min: 97.1 °F (36.2 °C)  Max: 98.8 °F (37.1 °C)  Constitutional: The patient is awake, alert, and oriented.   Skin: Warm and dry. No rashes were noted. No jaundice.  HEENT: Eyes show round, and reactive pupils. Moist mucous 
[facility]  Neuro Inpatient Follow Up        Lissa Diggs is a 59 y.o.  female     Neurology following for seizure-like activity    PMH; Type 2 DM, cirrhosis of the liver, skin abscesses, history of MRSA, MSSA and enterococci, Pseudomonas, and morbid obesity, afib, thrombocytopenia, MAY    HPI:  The patient was transferred from [facility] with an AMS on 10/4 after initially presenting there from nursing home for UTI. Mentation declined despite abx..  reports progressive decline over past 5-6 months after she had an injury in a transport van at the nursing home.  Has been in nursing home since Nov 2023 after she was falling from knee pain    She has also developed flexion contractures in bilateral hands with loss motor dexterity in her hands. Assistance with ADLs and cog fx decreased compared to baseline.       also reported pt having tremors in her shoulders with eyes rolled back in her head and decreased responsiveness.  Patient remains extremely lethargic most of the time.  Tremor activity noted in the ED and patient given Keppra load and has been started on Keppra 500 mg twice daily.      EEG 10/7 neg. MRI brain WO 10/5 neg. MRI c-spine WO showed severe stenosis C5-C6     She apparently had outpatient appointment to see a neurologist over the last several weeks with missed appointments for a number of reasons.     Pt reported diffuse pain and pain in neck. R/P MRI c-spine WWO under anesthesia was ordered to be done 10/16.    Subjective:     She is lying in bed, crying and grimacing in pain.  She states her buttocks and her sides are hurting and she is also been reporting neck pain.  Repeat MRI of the cervical spine is planned for 2 PM.  She is receiving narcotics for pain.    No seizure like activity     is at bedside and states her memory remains poor. She has a family hx of dementia    No chest pain or palpitations  No SOB  No vertigo, lightheadedness or loss 
Message left for Renetta Vallecillo with speech to see if patient can be switched to thin liquids or if thickened liquids are still needed.   
Message sent to Dr. Cerda  via perfect serve due to patients K of 5.1 at 1200.  
Message sent to Dr. Cerda due to results of swallow evaluation, concerns of home medications per patients spouse, and spouse requesting to speak to MD .   
Message sent to Dr. Marinelli due to patient requiring medication prior to MRI due to Claustrophobia.  
Message sent to Isis, due to ativan needed due to claustrophobia for MRI.  
Messaged Dr. Olvera via [employer] per family request for sleep aid for tonight.   
Messaged Dr. Parker regarding new lump in neck/throat region.   
Messaged attending regarding  tele order    Gail Sparks RN    
[facility]  [facility]  Progress Note      No chief complaint on file.      SUBJECTIVE:    Patient is tolerating medications. No reported adverse drug reactions.  No nausea, vomiting, diarrhea. Resting in bed. RN and family in room.  concerned about her AMS that has been present for  2-3 months. Neurology following. She has had MRI brain, EEG and spine MRI.    Review of systems:  As stated above in the chief complaint, otherwise negative.    Medications:  Scheduled Meds:   sodium chloride flush  5-40 mL IntraVENous 2 times per day    lidocaine 1 % injection  50 mg IntraDERmal Once    lactulose  20 g Oral TID    rifAXIMin  400 mg Oral TID    collagenase   Topical Daily    meropenem  1,000 mg IntraVENous Q8H    ascorbic acid  500 mg Oral Daily    miconazole   Topical BID    miconazole nitrate   Topical BID    levETIRAcetam  500 mg IntraVENous Q12H    melatonin  3 mg Oral Nightly    [Held by provider] heparin (porcine)  5,000 Units SubCUTAneous 3 times per day    insulin lispro  0-8 Units SubCUTAneous TID WC    insulin lispro  0-4 Units SubCUTAneous Nightly     Continuous Infusions:   sodium chloride      sodium chloride      sodium chloride      sodium chloride      dextrose       PRN Meds:sodium chloride flush, sodium chloride, ipratropium 0.5 mg-albuterol 2.5 mg, miconazole nitrate, sodium chloride, sodium chloride, oxyCODONE, sodium chloride, LORazepam, potassium chloride **OR** potassium alternative oral replacement **OR** potassium chloride, magnesium sulfate, ondansetron **OR** ondansetron, polyethylene glycol, acetaminophen **OR** acetaminophen, aluminum & magnesium hydroxide-simethicone, glucose, dextrose bolus **OR** dextrose bolus, glucagon (rDNA), dextrose    OBJECTIVE:  BP (!) 148/79   Pulse 93   Temp 97.6 °F (36.4 °C) (Temporal)   Resp 20   Ht 1.626 m (5' 4\")   Wt 119.6 kg (263 lb 10.7 oz)   SpO2 98%   BMI 45.26 kg/m²   Temp  Av.5 °F (36.4 °C)  Min: 97.2 °F 
Need MRI screening form filled out to clear patient before the MRI can be scheduled. Thank you.    
New order obtained may start Allopurinol 100mg po twice a day.  
Notified Dr. Bello that pt's hemoglobin is 6.4 and platelets are 53.  
Occupational Therapy  OCCUPATIONAL THERAPY INITIAL EVALUATION    [facility]  [address], [place]      Date:10/8/2024                                                Patient Name: Lissa Diggs  MRN: [id number]  : 1965  Room: [address] [place] [address]    Evaluating OT: Guillermo Baker OTR/L #[id number]     Referring Provider: Michael Castillo MD   Specific Provider Orders/Date: OT eval and treat 10/7/24    Diagnosis: Acute delirium [R41.0]  Seizure-like activity (HCC) [R56.9]   Pt admitted to hospital with seizure like activity / delirium. Pt transferred from [facility]; admitted there from nursing facility     Pertinent Medical History:  has no past medical history on file.       Precautions:  Fall Risk, NGT, O2, wounds, TAPS, bed alarm, contact isolation    Assessment of current deficits    [x] Functional mobility  [x]ADLs  [x] Strength               [x]Cognition    [x] Functional transfers   [x] IADLs         [x] Safety Awareness   [x]Endurance    [x] Fine Coordination              [x] Balance      [x] Vision/perception   [x]Sensation     [x]Gross Motor Coordination  [x] ROM  [x] Delirium                   [x] Motor Control     OT PLAN OF CARE   OT POC based on physician orders, patient diagnosis and results of clinical assessment    Frequency/Duration 1-3 days/wk for 2 weeks PRN   Specific OT Treatment Interventions to include:   * Instruction/training on adapted ADL techniques and AE recommendations to increase functional independence within precautions       * Training on energy conservation strategies, correct breathing pattern and techniques to improve independence/tolerance for self-care routine  * Functional transfer/mobility training/DME recommendations for increased independence, safety, and fall prevention  * Patient/Family education to increase follow through with safety techniques and functional independence  * Recommendation of environmental 
Palliative Medicine  Progress Note    Goals of care and code status have been addressed.  Goal is to continue with current plan of care and full code status.  There are no further PM needs at this time.  PM will now sign off.  If new PM needs arise, please re-consult.  Thank you.    [facility]  Palliative Medicine    Note: This report was completed using [employer] voiced recognition software.  Every effort has been made to ensure accuracy; however, inadvertent computerized transcription errors may be present.    
Patient dressing changes completed at this time.  
Patient educated on the need for every two hour turns for skin integrity.   
[facility]  [facility]  Progress Note      No chief complaint on file.      SUBJECTIVE:    Patient is tolerating medications. No reported adverse drug reactions.  No nausea, vomiting, diarrhea.    Review of systems:  As stated above in the chief complaint, otherwise negative.    Medications:  Scheduled Meds:   lactulose  20 g Oral TID    rifAXIMin  400 mg Oral TID    LORazepam  1 mg IntraVENous Once    collagenase   Topical Daily    meropenem  1,000 mg IntraVENous Q8H    ascorbic acid  500 mg Oral Daily    miconazole   Topical BID    miconazole nitrate   Topical BID    levETIRAcetam  500 mg IntraVENous Q12H    sodium chloride flush  5-40 mL IntraVENous 2 times per day    melatonin  3 mg Oral Nightly    [Held by provider] heparin (porcine)  5,000 Units SubCUTAneous 3 times per day    insulin lispro  0-8 Units SubCUTAneous TID WC    insulin lispro  0-4 Units SubCUTAneous Nightly     Continuous Infusions:   sodium chloride      sodium chloride      sodium chloride      sodium chloride      dextrose       PRN Meds:ipratropium 0.5 mg-albuterol 2.5 mg, miconazole nitrate, sodium chloride, sodium chloride, oxyCODONE, sodium chloride, LORazepam, sodium chloride flush, sodium chloride, potassium chloride **OR** potassium alternative oral replacement **OR** potassium chloride, magnesium sulfate, ondansetron **OR** ondansetron, polyethylene glycol, acetaminophen **OR** acetaminophen, aluminum & magnesium hydroxide-simethicone, glucose, dextrose bolus **OR** dextrose bolus, glucagon (rDNA), dextrose    OBJECTIVE:  /60   Pulse 98   Temp 97 °F (36.1 °C) (Temporal)   Resp 20   Ht 1.626 m (5' 4\")   Wt 118.4 kg (261 lb)   SpO2 94%   BMI 44.80 kg/m²   Temp  Av °F (36.1 °C)  Min: 96.7 °F (35.9 °C)  Max: 97.3 °F (36.3 °C)  Constitutional: The patient is awake, alert, and oriented.   Skin: Warm and dry. No rashes were noted. No jaundice.  HEENT: Eyes show round, and reactive pupils. Moist 
Perfect serve sent to Dr.Khreis GRACE in regards to family requesting patient to resume Allopurinol 100mg po twice a day.   
Pharmacy Consultation Note  (Antibiotic Dosing and Monitoring)    Initial consult date: 10/5/24  Consulting physician/provider: Dr. GUS Haque  Drug: Vancomycin  Indication: sepsis 2/2 unknown etiology; 7 days    Age/  Gender Height Weight IBW  Allergy Information   59 y.o./female   120.2 kg (265 lb 1 oz)     Ideal body weight: 54.7 kg (120 lb 9.5 oz)  Adjusted ideal body weight: 80.5 kg (177 lb 8.9 oz)   Patient has no known allergies.      Renal Function:  Recent Labs     10/05/24  0502 10/05/24  1639 10/06/24  0618   BUN 29* 29* PENDING   CREATININE 0.8 0.8 PENDING       Intake/Output Summary (Last 24 hours) at 10/6/2024 0850  Last data filed at 10/6/2024 0544  Gross per 24 hour   Intake 608 ml   Output 750 ml   Net -142 ml       Vancomycin Monitoring:  Trough:  No results for input(s): \"VANCOTROUGH\" in the last 72 hours.  Random:  No results for input(s): \"VANCORANDOM\" in the last 72 hours.    Vancomycin Administration Times:    Recent vancomycin administrations                     vancomycin (VANCOCIN) 1,250 mg in sodium chloride 0.9 % 250 mL IVPB ([id number]) (mg) 1,250 mg New Bag 10/05/24 2246    vancomycin (VANCOCIN) 2,000 mg in sodium chloride 0.9 % 500 mL IVPB (mg) 2,000 mg New Bag 10/05/24 1222                  Assessment:  Patient is a 59 y.o. female who has been initiated on vancomycin  CrCl cannot be calculated (This lab value cannot be used to calculate CrCl because it is not a number: PENDING).  To dose vancomycin, pharmacy will be utilizing  AUC/MUSTAPHA 400-600 and trough goal 15-20 mcg/mL    Plan:  Continue vancomycin 1250 mg IV q 12 hr   Will check vancomycin trough level tomorrow am at 1000   Will continue to monitor renal function   Pharmacy to follow    Thank you for the consult,     Louie Caba, PharmD, BCPS, BCCCP 10/6/2024 8:50 AM  SEB: [phone]  SEY: [phone]  SJW: [phone]    
Pharmacy Consultation Note  (Antibiotic Dosing and Monitoring)    Initial consult date: 10/5/24  Consulting physician/provider: Dr. GUS Haque  Drug: Vancomycin  Indication: sepsis 2/2 unknown etiology; 7 days    Age/  Gender Height Weight IBW  Allergy Information   59 y.o./female   120.2 kg (265 lb 1 oz)     Ideal body weight: 54.7 kg (120 lb 9.5 oz)  Adjusted ideal body weight: 80.8 kg (178 lb 1.8 oz)   Patient has no known allergies.      Renal Function:  Recent Labs     10/04/24  2053 10/05/24  0502   BUN 28* 29*   CREATININE 0.8 0.8       Intake/Output Summary (Last 24 hours) at 10/5/2024 1104  Last data filed at 10/5/2024 0944  Gross per 24 hour   Intake 60 ml   Output 515 ml   Net -455 ml       Vancomycin Monitoring:  Trough:  No results for input(s): \"VANCOTROUGH\" in the last 72 hours.  Random:  No results for input(s): \"VANCORANDOM\" in the last 72 hours.    Vancomycin Administration Times:  Recent vancomycin administrations        No vancomycin IV orders with administrations found.            Orders not given:            vancomycin (VANCOCIN) 2,000 mg in sodium chloride 0.9 % 500 mL IVPB    vancomycin (VANCOCIN) 1,250 mg in sodium chloride 0.9 % 250 mL IVPB ([id number])                    Assessment:  Patient is a 59 y.o. female who has been initiated on vancomycin  Estimated Creatinine Clearance: 95 mL/min (based on SCr of 0.8 mg/dL).  To dose vancomycin, pharmacy will be utilizing  AUC/MUSTAPHA 400-600 and trough goal 15-20 mcg/mL    Plan:  Vancomycin 2000 mg IV x 1 dose, followed by vancomycin 1250 mg IV q 12 hr   Will check vancomycin levels when appropriate  Will continue to monitor renal function   Pharmacy to follow    Thank you for the consult,     Louie Caba, PharmD, BCPS, BCCCP 10/5/2024 11:04 AM  SEB: [phone]  SEY: [phone]  SJW: [phone]    
Pharmacy Consultation Note  (Antibiotic Dosing and Monitoring)    Initial consult date: 10/5/24  Consulting physician/provider: Dr. GUS Haque  Drug: Vancomycin  Indication: sepsis 2/2 unknown etiology; 7 days    Age/  Gender Height Weight IBW  Allergy Information   59 y.o./female 162.6 cm (5' 4\") 120.2 kg (265 lb 1 oz)     Ideal body weight: 54.7 kg (120 lb 9.5 oz)  Adjusted ideal body weight: 81.7 kg (180 lb 3.1 oz)   Patient has no known allergies.      Renal Function:  Recent Labs     10/05/24  1639 10/06/24  0618 10/07/24  0819   BUN 29* 27* 22   CREATININE 0.8 0.8 0.7       Intake/Output Summary (Last 24 hours) at 10/7/2024 1229  Last data filed at 10/7/2024 0609  Gross per 24 hour   Intake 4693.35 ml   Output 950 ml   Net 3743.35 ml       Vancomycin Monitoring:  Trough:  No results for input(s): \"VANCOTROUGH\" in the last 72 hours.  Random:  No results for input(s): \"VANCORANDOM\" in the last 72 hours.    Vancomycin Administration Times:    Recent vancomycin administrations                     vancomycin (VANCOCIN) 1,250 mg in sodium chloride 0.9 % 250 mL IVPB ([id number]) (mg) 1,250 mg New Bag 10/07/24 1225     1,250 mg New Bag 10/06/24 2308     1,250 mg New Bag  1112     1,250 mg New Bag 10/05/24 2246    vancomycin (VANCOCIN) 2,000 mg in sodium chloride 0.9 % 500 mL IVPB (mg) 2,000 mg New Bag 10/05/24 1222                      Assessment:  Patient is a 59 y.o. female who has been initiated on vancomycin  Estimated Creatinine Clearance: 110 mL/min (based on SCr of 0.7 mg/dL).  To dose vancomycin, pharmacy will be utilizing  AUC/MUSTAPHA 400-600 and trough goal 15-20 mcg/mL  10/7:  Per nursing staff, trough level not drawn today at 10:00 am - will draw trough level tonight before 23:00 dose.  Scr stable (0.7), U/O 0.3 mL/kg/hr, afebrile.  WBC 3.7.      Plan:  Continue vancomycin 1250 mg IV q 12 hr   Will check vancomycin trough level tonight at 22:00  Will continue to monitor renal function   Pharmacy to follow    Thank 
Pharmacy Consultation Note  (Antibiotic Dosing and Monitoring)    Initial consult date: 10/5/24  Consulting physician/provider: Dr. GUS Haque  Drug: Vancomycin  Indication: sepsis 2/2 unknown etiology; 7 days    Vancomycin has been discontinued   Clinical Pharmacy to sign-off  Physician to re-consult pharmacy if future dosing is needed    Thank you for the consult,    Kimberly Bernard, PharmD  PGY1 Pharmacy Practice Resident [phone]  10/8/2024 2:12 PM          
Physical Therapy  Physical Therapy Initial Assessment     Name: Lissa Diggs  : 1965  MRN: [id number]      Date of Service: 10/8/2024    Evaluating PT:  Gregory Benitez PT, DPT    Room #:  7418/7418-A  Diagnosis:  Acute delirium [R41.0]  Seizure-like activity (HCC) [R56.9]  PMHx/PSHx:  hepatic cirrhosis, DM, obesity  Procedure/Surgery:  N/A  Precautions:  fall risk, R LE wound on thigh, NG tube, contact isolation, O2, TAPS, bed bound  Equipment Owned: N/A  Equipment Needs:  TBD    SUBJECTIVE:    Pt admitted from NH.   reports pt has been bed bound for 6 months due to her RLE wound.    OBJECTIVE:   Initial Evaluation  Date: 10/08/24 Treatment Short Term/ Long Term   Goals   AM-PAC 6 Clicks      Was pt agreeable to Eval/treatment? yes     Does pt have pain? Back and buttock pain unrated     Bed Mobility  Rolling: Dep, partial roll  Supine to sit: NT  Sit to supine: NT  Scooting: NT  Rolling: MaxA  Supine to sit: MaxA  Sit to supine: MaxA  Scooting: MaxA   Transfers Sit to stand: NT  Stand to sit: NT  Stand pivot: NT  Make goal as appropriate   Ambulation   NT     Stair negotiation: ascended and descended NT       Strength/ROM:   BLE gross strength 1/5  BLE ROM limited    Balance:   Static Sitting: NT  Dynamic Sitting: NT  Static Standing: NT  Dynamic Standing: NT    Pt is A & O x 2  Sensation:  Pt denies numbness and tingling to extremities  Edema:  None noted    Vitals:  SpO2 and HR were stable during session    Therapeutic Exercises:    Bed mobility: rolling, cued for hand/foot placement and positioning  BLE AAROM and PROM    Patient education  Pt educated on role of PT, importance of functional mobility during hospital stay, and safety of functional mobility    Patient response to education:   Pt verbalized understanding Pt demonstrated skill Pt requires further education in this area   yes partial yes     ASSESSMENT:    Conditions Requiring Skilled Therapeutic Intervention:    [x]Decreased 
Physical Therapy  Treatment Note    Name: Lissa Diggs  : 1965  MRN: [id number]      Date of Service: 10/14/2024    Evaluating PT:  Gregory Benitez PT, DPT    Room #:  7418/7418-A  Diagnosis:  Acute delirium [R41.0]  Seizure-like activity (HCC) [R56.9]  PMHx/PSHx:  hepatic cirrhosis, DM, obesity  Procedure/Surgery:  N/A  Precautions:  fall risk, R posterior thigh wound, R heel wound, sacral wound, , contact isolation, O2, continuous pulse ox, TAPS, bed bound, FMS, Jarquin, Rodger bed  Equipment Owned: N/A  Equipment Needs:  TBD    SUBJECTIVE:    Pt admitted from NH.   reports pt has been bed bound for 6 months due to her RLE wound.    OBJECTIVE:   Initial Evaluation  Date: 10/08/24 Treatment Date: 10/14/24 Short Term/ Long Term   Goals   AM-PAC 6 Clicks     Was pt agreeable to Eval/treatment? yes Yes    Does pt have pain? Back and buttock pain unrated Pt c/o global pain    Bed Mobility  Rolling: Dep, partial roll  Supine to sit: NT  Sit to supine: NT  Scooting: NT Rolling: Dependent   Supine to sit: Unable to long sit  Sit to supine: NT  Scooting: NT Rolling: MaxA  Supine to sit: MaxA  Sit to supine: MaxA  Scooting: MaxA   Transfers Sit to stand: NT  Stand to sit: NT  Stand pivot: NT Sit to stand: NT  Stand to sit: NT  Stand pivot: NT Make goal as appropriate   Ambulation   NT NT    Stair negotiation: ascended and descended NT NT      Pt is A & O x 1 (pt responds to name; disoriented to place, month, year, and situation)  Sensation:  Pt denies numbness and tingling to extremities  Edema:  Unremarkable   Vitals:  SpO2: 97-98% on 2L/min O2, HR:  bpm during mobility    Therapeutic Exercises:  Rolling x2, AAROM of ankles, AAROM of knees    Patient education  Pt educated on role of PT in acute setting, benefits of upright mobility, use of call light in room, safety.    Patient response to education:   Pt verbalized understanding Pt demonstrated skill Pt requires further education in this area   Yes 
Physical Therapy  Treatment Note    Name: Lissa Diggs  : 1965  MRN: [id number]      Date of Service: 10/10/2024    Evaluating PT:  Gregory Benitez PT, DPT    Room #:  7418/7418-A  Diagnosis:  Acute delirium [R41.0]  Seizure-like activity (HCC) [R56.9]  PMHx/PSHx:  hepatic cirrhosis, DM, obesity  Procedure/Surgery:  N/A  Precautions:  fall risk, R LE wound on thigh, NG tube, contact isolation, O2, TAPS, bed bound  Equipment Owned: N/A  Equipment Needs:  TBD    SUBJECTIVE:    Pt admitted from NH.   reports pt has been bed bound for 6 months due to her RLE wound.    OBJECTIVE:   Initial Evaluation  Date: 10/08/24 Treatment Date: 10/10/24 Short Term/ Long Term   Goals   AM-PAC 6 Clicks     Was pt agreeable to Eval/treatment? yes Yes    Does pt have pain? Back and buttock pain unrated Back and R LE pain    Bed Mobility  Rolling: Dep, partial roll  Supine to sit: NT  Sit to supine: NT  Scooting: NT Rolling: Dependent   Supine to sit: Unable to long sit  Sit to supine: NT  Scooting: NT Rolling: MaxA  Supine to sit: MaxA  Sit to supine: MaxA  Scooting: MaxA   Transfers Sit to stand: NT  Stand to sit: NT  Stand pivot: NT Sit to stand: NT  Stand to sit: NT  Stand pivot: NT Make goal as appropriate   Ambulation   NT NT    Stair negotiation: ascended and descended NT NT      Pt is A & O x: 4 to person, place, month/year, and situation.    Sensation: Pt denies numbness and tingling of extremities.   Edema: Unremarkable    Patient education  Pt educated on PT role in acute care setting.    Patient response to education:   Pt verbalized understanding Pt demonstrated skill Pt requires further education in this area   Yes NA No      ASSESSMENT:    Comments:    Pt was om bed upon room entry; agreeable to PT treatment.  present. Pt completed all mobility noted above. Activity was limited due to pain. Pt was unable long sit due to pain and required total assist to roll. Pt was positioned comfortably with 
Picc line dressing changed  
Power picc line  Placement 10/11/2024    Product number: [id number]   Lot Number: [id number]      Ultrasound: yes   Right Brachial vein:                Upper Arm Circumference: (CM) 41cm    Size:(FR)/GUAGE 5.5frdl    Exposed Length: (CM) 0cm    Internal Length: (CM) 41cm   Cut: (CM) 14cm   Vein Measurement: 0.60cm              Lidocaine Given: yes 1%  Trina Medina RN  10/11/2024  3:52 PM      Power picc line placed with vps tip conformation. Tip in lower 1/3 svc/caj                   
RN sent message to wound care RN regarding consult for eval and treatment. Awaiting response.   
Reached out to Dr. Bello regarding pt's SOB and requested breathing treatments PRN.  PRN Duoneb ordered  
Reached out to Dr. Bello regarding pt's anxiety w/ sob and feeling like heart is racing.   One time dose of Xanax 0.5mg ordered.  
SPEECH/LANGUAGE PATHOLOGY  VIDEOFLUOROSCOPIC STUDY OF SWALLOWING (MBS)   and PLAN OF CARE    PATIENT NAME:  Lissa Diggs  (female)     MRN:  [id number]    :  1965  (59 y.o.)  STATUS:  Inpatient: Room 7418/7418-A    TODAY'S DATE:  10/9/2024  ORDER DATE, DESCRIPTION AND REFERRING PROVIDER:    REASON FOR REFERRAL: dysphagia.   EVALUATING THERAPIST: JAGJIT Tenorio      RESULTS:      DYSPHAGIA DIAGNOSIS:  Clinical indicators of moderate-severe oropharyngeal phase dysphagia     DIET RECOMMENDATIONS:  Pureed consistency solids (IDDSI level 4) with  pudding thick liquids   FEEDING RECOMMENDATIONS:    Assistance level:  Supervision is needed during all oral intake     Compensatory strategies recommended: Thorough oral care to prevent colonization of oral bacteria   Double swallow     Discussed recommendations with:  patient nurse via phone    SPEECH THERAPY  PLAN OF CARE   The dysphagia POC is established based on physician order and dysphagia diagnosis    Skilled SLP intervention for dysphagia management on acute care up to 5 x per week until goals met, pt plateaus in function and/or discharged from hospital      Conditions Requiring Skilled Therapeutic Intervention for dysphagia:    Patient is performing below functional baseline d/t  current acute condition, Multiple diagnoses, multiple medications, and increased dependency upon caregivers.    SPECIFIC DYSPHAGIA INTERVENTIONS TO INCLUDE:     PO trials of upgraded diet textures with SLP only to determine the least restrictive PO diet   oral motor strength/ coordination exercises to improve bolus prep/ control and mastication  exercises to target laryngeal elevation   Shaker and/or Chin Tuck Against Resistance (CTAR) to increase UES relaxation diameter and increase anterior laryngeal excursion to reduce pharyngeal residuals and reduce risk of pen/asp     Specific instructions for next treatment:  ongoing PO analysis to upgrade diet and evaluate tolerance of 
STNA made nurse aware while doing vs, patients NG tube fell out, patient assessed, new NG placed to left nare without difficulty patient tolerated well,  verified placement with air bolus, Xray order placed to confirm,  
[facility]  [facility]  Progress Note      No chief complaint on file.      SUBJECTIVE:    Patient is tolerating medications. No reported adverse drug reactions.  No nausea, vomiting, diarrhea.    Review of systems:  As stated above in the chief complaint, otherwise negative.    Medications:  Scheduled Meds:   sodium chloride flush  5-40 mL IntraVENous 2 times per day    lidocaine 1 % injection  50 mg IntraDERmal Once    lactulose  20 g Oral TID    rifAXIMin  400 mg Oral TID    collagenase   Topical Daily    meropenem  1,000 mg IntraVENous Q8H    ascorbic acid  500 mg Oral Daily    miconazole   Topical BID    miconazole nitrate   Topical BID    levETIRAcetam  500 mg IntraVENous Q12H    sodium chloride flush  5-40 mL IntraVENous 2 times per day    melatonin  3 mg Oral Nightly    [Held by provider] heparin (porcine)  5,000 Units SubCUTAneous 3 times per day    insulin lispro  0-8 Units SubCUTAneous TID WC    insulin lispro  0-4 Units SubCUTAneous Nightly     Continuous Infusions:   sodium chloride      sodium chloride      sodium chloride      sodium chloride      sodium chloride      dextrose       PRN Meds:sodium chloride flush, sodium chloride, ipratropium 0.5 mg-albuterol 2.5 mg, miconazole nitrate, sodium chloride, sodium chloride, oxyCODONE, sodium chloride, LORazepam, sodium chloride flush, sodium chloride, potassium chloride **OR** potassium alternative oral replacement **OR** potassium chloride, magnesium sulfate, ondansetron **OR** ondansetron, polyethylene glycol, acetaminophen **OR** acetaminophen, aluminum & magnesium hydroxide-simethicone, glucose, dextrose bolus **OR** dextrose bolus, glucagon (rDNA), dextrose    OBJECTIVE:  /80   Pulse 98   Temp 97.4 °F (36.3 °C) (Temporal)   Resp 21   Ht 1.626 m (5' 4\")   Wt 119.6 kg (263 lb 10.7 oz)   SpO2 98%   BMI 45.26 kg/m²   Temp  Av.5 °F (36.4 °C)  Min: 97.2 °F (36.2 °C)  Max: 98 °F (36.7 °C)  Constitutional: The 
[facility]  [facility]  Progress Note      No chief complaint on file.      SUBJECTIVE:    Patient is tolerating medications. No reported adverse drug reactions.  No nausea, vomiting.  Resting in bed. Family present. No new complaints or concerns.     Review of systems:  As stated above in the chief complaint, otherwise negative.    Medications:  Scheduled Meds:   melatonin  6 mg Oral Nightly    allopurinol  100 mg Oral BID    sodium chloride flush  5-40 mL IntraVENous 2 times per day    lidocaine 1 % injection  50 mg IntraDERmal Once    lactulose  20 g Oral TID    rifAXIMin  400 mg Oral TID    collagenase   Topical Daily    meropenem  1,000 mg IntraVENous Q8H    ascorbic acid  500 mg Oral Daily    miconazole   Topical BID    miconazole nitrate   Topical BID    levETIRAcetam  500 mg IntraVENous Q12H    heparin (porcine)  5,000 Units SubCUTAneous 3 times per day    insulin lispro  0-8 Units SubCUTAneous TID WC    insulin lispro  0-4 Units SubCUTAneous Nightly     Continuous Infusions:   sodium chloride      sodium chloride      sodium chloride      sodium chloride      dextrose       PRN Meds:morphine, zolpidem, HYDROcodone 5 mg - acetaminophen, hydrOXYzine pamoate, sodium chloride flush, sodium chloride, ipratropium 0.5 mg-albuterol 2.5 mg, miconazole nitrate, sodium chloride, sodium chloride, sodium chloride, LORazepam, potassium chloride **OR** potassium alternative oral replacement **OR** potassium chloride, magnesium sulfate, ondansetron **OR** ondansetron, polyethylene glycol, acetaminophen **OR** acetaminophen, aluminum & magnesium hydroxide-simethicone, glucose, dextrose bolus **OR** dextrose bolus, glucagon (rDNA), dextrose    OBJECTIVE:  /61   Pulse 92   Temp 98 °F (36.7 °C) (Temporal)   Resp 23   Ht 1.626 m (5' 4.02\")   Wt 118.8 kg (262 lb)   SpO2 98%   BMI 44.95 kg/m²   Temp  Av.4 °F (36.3 °C)  Min: 96.7 °F (35.9 °C)  Max: 98 °F (36.7 °C)  Constitutional: 
Spiritual Health History and Assessment/Progress Note  [facility]    (P) Initial Encounter, Spiritual/Emotional Needs, Palliative Care,  ,  ,      Name: Lissa Diggs MRN: [id number]    Age: 59 y.o.     Sex: female   Language: English   Uatsdin: Sabianist   Acute delirium     Date: 10/9/2024                           Spiritual Assessment began in [facility]        Referral/Consult From: (P) Rounding, Palliative Care   Encounter Overview/Reason: (P) Initial Encounter, Spiritual/Emotional Needs, Palliative Care  Service Provided For: (P) Patient, Significant other    Isha, Belief, Meaning:   Patient identifies as spiritual, is connected with a isha tradition or spiritual practice, has beliefs or practices that help with coping during difficult times, and Other: Mrs. Diggs described herself as a Yazdanism from the Sabianist denomination.  While she is not currently attending a local Buddhist, she does value prayer as a vargas part of her isha practice.    Family/Friends identify as spiritual, are connected with a isha tradition or spiritual practice, have beliefs or practices that help with coping during difficult times, and Other: Mr. Diggs shares his wife's beliefs.       Importance and Influence:  Patient has spiritual/personal beliefs that influence decisions regarding their health and Other: Both  And Mrs. Diggs expressed how important their isha has been in working through this past year with [facility] health deteriorating.   Family/Friends have spiritual/personal beliefs that influence decisions regarding the patient's health    Community:  Patient feels well-supported. Support system includes: Spouse/Partner, Children, and Extended family  Family/Friends feel well-supported. Support system includes: Spouse/Partner, Children, and Extended family    Assessment and Plan of Care:     Patient Interventions include: Facilitated expression of thoughts and feelings, Explored spiritual 
Spoke with Anesthesia and the MRI would be done on Monday. MRI will have to arrange a time and staff can call on Monday to schedule.  
Spoke with MRI, patient does not have a scheduled time for MRI. I also asked that they call us due to patient being claustrophobic and will require medication prior to testing.  
Spoke with anesthesia regarding the patients mri, the only time they have available currently will be Wednesday at 1400. Rn notified.   
Vanc infiltrated. Apply cold and hot compresses to site and elevate per pharmacy. IV removed, compress applied    Sarah Bermeo RN    
144/71   Pulse (!) 101   Temp 97.8 °F (36.6 °C) (Temporal)   Resp 20   Ht 1.626 m (5' 4\")   Wt 122.3 kg (269 lb 9.6 oz)   SpO2 95%   BMI 46.28 kg/m²     General Appearance: anxious   Skin: warm and dry  Head: normocephalic and atraumatic  Eyes: pupils equal, round, and reactive to light, extraocular eye movements intact, conjunctivae normal  Neck: neck supple and non tender without mass   Pulmonary/Chest: clear to auscultation bilaterally- no wheezes, rales or rhonchi, normal air movement, no respiratory distress  Cardiovascular: normal rate, normal S1 and S2 and no carotid bruits  Abdomen: soft, non-tender, non-distended, normal bowel sounds, no masses or organomegaly  Extremities: no cyanosis, no clubbing and no edema  Neurologic: no cranial nerve deficit and speech normal    Recent Labs     10/05/24  1639 10/06/24  0618 10/07/24  0819    140 140   K 5.0 4.7 4.5   * 110* 109*   CO2 22 21* 23   BUN 29* 27* 22   CREATININE 0.8 0.8 0.7   GLUCOSE 168* 164* 205*   CALCIUM 8.8 8.8 9.3       Recent Labs     10/06/24  0618 10/06/24  1743 10/06/24  1836 10/07/24  0819   WBC 2.5*  --  2.1* 3.7*   RBC 1.89*  --  1.85* 2.48*   HGB 6.7* 6.9* 6.4* 8.5*   HCT 21.3* 21.9* 20.1* 26.3*   .7*  --  108.6* 106.0*   MCH 35.4*  --  34.6 34.3   MCHC 31.5*  --  31.8* 32.3   RDW 16.4*  --  19.3* 19.9*   PLT 48*  --  53* 53*   MPV 10.9  --  11.4 10.5       I/O last 3 completed shifts:  In: 5301.4 [P.O.:180; Blood:3646.4; NG/GT:1475]  Out: 1700 [Urine:1700]  No intake/output data recorded.    No results found for this or any previous visit.      SYNOPSIS: 59 y.o. year old female with past medical history of hepatic cirrhosis on rifaximin, insulin-dependent diabetes mellitus, morbid obesity, bedbound, recurrent skin abscesses with history of MRSA, MSSA and enterococci, Pseudomonas was brought to the ER by  from nursing home due to altered mental status.  Was initially brought to [facility].  
No use of accessory muscles to breathe. Symmetrical expansion. Auscultation reveals no wheezing, crackles, or rhonchi.   Cardiovascular: S1 and S2 are rhythmic and regular. No murmurs appreciated.   Abdomen: Positive bowel sounds to auscultation. Benign to palpation. No masses felt. No hepatosplenomegaly.  Genitourinary: Minimal tenderness in the lower abdomen  Extremities: No clubbing, no cyanosis, no edema.  Musculoskeletal: Multiple skin wounds noted, sacral decubitus ulcer, posterior thigh infected ulcer, right heel pressure ulcer  Neurological: Following commands, no focal neurodeficit  Lines: peripheral    Laboratory and Tests Review:  Lab Results   Component Value Date    WBC 3.7 (L) 10/07/2024    WBC 2.1 (L) 10/06/2024    WBC 2.5 (L) 10/06/2024    HGB 8.5 (L) 10/07/2024    HCT 26.3 (L) 10/07/2024    .0 (H) 10/07/2024    PLT 53 (L) 10/07/2024     Lab Results   Component Value Date    NEUTROABS 1.60 (L) 10/06/2024    NEUTROABS 1.93 10/06/2024    NEUTROABS 5.67 10/05/2024     No results found for: \"CRPHS\"  Lab Results   Component Value Date    ALT 10 10/06/2024    AST 20 10/06/2024    ALKPHOS 84 10/06/2024    BILITOT 0.9 10/06/2024     Lab Results   Component Value Date/Time     10/07/2024 08:19 AM    K 4.5 10/07/2024 08:19 AM     10/07/2024 08:19 AM    CO2 23 10/07/2024 08:19 AM    BUN 22 10/07/2024 08:19 AM    CREATININE 0.7 10/07/2024 08:19 AM    CREATININE 0.8 10/06/2024 06:18 AM    CREATININE 0.8 10/05/2024 04:39 PM    LABGLOM >90 10/07/2024 08:19 AM    LABGLOM 29 12/20/2023 06:15 PM    GLUCOSE 205 10/07/2024 08:19 AM    CALCIUM 9.3 10/07/2024 08:19 AM    BILITOT 0.9 10/06/2024 06:18 AM    ALKPHOS 84 10/06/2024 06:18 AM    AST 20 10/06/2024 06:18 AM    ALT 10 10/06/2024 06:18 AM     Lab Results   Component Value Date    CRP 50.0 (H) 10/05/2024    CRP 1.5 (H) 10/24/2022     Lab Results   Component Value Date    SEDRATE 40 (H) 10/04/2024    SEDRATE 87 (H) 10/24/2022 
dry. No rashes were noted. No jaundice.  HEENT:  Moist mucous membranes, no ulcerations, no thrush.   Neck: Supple to movements. No lymphadenopathy.   Chest: No use of accessory muscles to breathe. Symmetrical expansion. Auscultation reveals no wheezing, crackles, or rhonchi.   Cardiovascular: S1 and S2 are rhythmic and regular. No murmurs appreciated.   Abdomen: Positive bowel sounds to auscultation. Benign to palpation. No masses felt. No hepatosplenomegaly.  Genitourinary: Minimal tenderness in the lower abdomen  Extremities: No clubbing, no cyanosis, no edema. contractures  Musculoskeletal: Multiple skin wounds noted, sacral decubitus ulcer, posterior thigh infected ulcer, right heel pressure ulcer  Neurological: Following commands, no focal neurodeficit  Lines: picc  Has other wound photos on file. These are the largest wounds                  Laboratory and Tests Review:  Lab Results   Component Value Date    WBC 3.8 (L) 10/12/2024    WBC 4.8 10/10/2024    WBC 4.9 10/09/2024    HGB 8.5 (L) 10/12/2024    HCT 27.5 (L) 10/12/2024    .4 (H) 10/12/2024    PLT 90 (L) 10/12/2024     Lab Results   Component Value Date    NEUTROABS 3.31 10/10/2024    NEUTROABS 3.29 10/09/2024    NEUTROABS 1.60 (L) 10/06/2024     No results found for: \"CRPHS\"  Lab Results   Component Value Date    ALT 10 10/06/2024    AST 20 10/06/2024    ALKPHOS 84 10/06/2024    BILITOT 0.9 10/06/2024     Lab Results   Component Value Date/Time     10/10/2024 09:39 AM    K 5.0 10/10/2024 09:39 AM     10/10/2024 09:39 AM    CO2 25 10/10/2024 09:39 AM    BUN 21 10/10/2024 09:39 AM    CREATININE 0.7 10/10/2024 09:39 AM    CREATININE 0.7 10/09/2024 12:08 PM    CREATININE 0.7 10/08/2024 06:52 AM    LABGLOM >90 10/10/2024 09:39 AM    LABGLOM 29 12/20/2023 06:15 PM    GLUCOSE 168 10/10/2024 09:39 AM    CALCIUM 9.2 10/10/2024 09:39 AM    BILITOT 0.9 10/06/2024 06:18 AM    ALKPHOS 84 10/06/2024 06:18 AM    AST 20 10/06/2024 06:18 AM    ALT 10 
°C)  Constitutional: The patient is awake, alert,   Skin: Warm and dry. No rashes were noted. No jaundice.  HEENT:  Moist mucous membranes, no ulcerations, no thrush.   Neck: Supple to movements. No lymphadenopathy.   Chest: No use of accessory muscles to breathe. Symmetrical expansion. Auscultation reveals no wheezing, crackles, or rhonchi.   Cardiovascular: S1 and S2 are rhythmic and regular. No murmurs appreciated.   Abdomen: Positive bowel sounds to auscultation. Benign to palpation. No masses felt. No hepatosplenomegaly.  Genitourinary: Minimal tenderness in the lower abdomen  Extremities: No clubbing, no cyanosis, no edema. contractures  Musculoskeletal: Multiple skin wounds noted, sacral decubitus ulcer, posterior thigh infected ulcer, right heel pressure ulcer  Neurological: Following commands, no focal neurodeficit, weakness  Lines: picc  Fecal manger-on lactulose  Has other wound photos on file. These are the largest wounds                  Laboratory and Tests Review:  Lab Results   Component Value Date    WBC 3.8 (L) 10/12/2024    WBC 4.8 10/10/2024    WBC 4.9 10/09/2024    HGB 8.5 (L) 10/12/2024    HCT 27.5 (L) 10/12/2024    .4 (H) 10/12/2024    PLT 90 (L) 10/12/2024     Lab Results   Component Value Date    NEUTROABS 3.31 10/10/2024    NEUTROABS 3.29 10/09/2024    NEUTROABS 1.60 (L) 10/06/2024     No results found for: \"CRPHS\"  Lab Results   Component Value Date    ALT 10 10/06/2024    AST 20 10/06/2024    ALKPHOS 84 10/06/2024    BILITOT 0.9 10/06/2024     Lab Results   Component Value Date/Time     10/10/2024 09:39 AM    K 5.0 10/10/2024 09:39 AM     10/10/2024 09:39 AM    CO2 25 10/10/2024 09:39 AM    BUN 21 10/10/2024 09:39 AM    CREATININE 0.7 10/10/2024 09:39 AM    CREATININE 0.7 10/09/2024 12:08 PM    CREATININE 0.7 10/08/2024 06:52 AM    LABGLOM >90 10/10/2024 09:39 AM    LABGLOM 29 12/20/2023 06:15 PM    GLUCOSE 168 10/10/2024 09:39 AM    CALCIUM 9.2 10/10/2024 09:39 AM    
°C)  Constitutional: The patient is awake, alert,   Skin: Warm and dry. No rashes were noted. No jaundice.  HEENT:  Moist mucous membranes, no ulcerations, no thrush.   Neck: Supple to movements. No lymphadenopathy.   Chest: No use of accessory muscles to breathe. Symmetrical expansion. Auscultation reveals no wheezing, crackles, or rhonchi.   Cardiovascular: S1 and S2 are rhythmic and regular. No murmurs appreciated.   Abdomen: Positive bowel sounds to auscultation. Benign to palpation. No masses felt. No hepatosplenomegaly.  Genitourinary: Minimal tenderness in the lower abdomen  Extremities: No clubbing, no cyanosis, no edema. contractures  Musculoskeletal: Multiple skin wounds noted, sacral decubitus ulcer, posterior thigh infected ulcer, right heel pressure ulcer  Neurological: Following commands, no focal neurodeficit, weakness  Lines: picc  Fecal manger-on lactulose  Has other wound photos on file. These are the largest wounds                  Laboratory and Tests Review:  Lab Results   Component Value Date    WBC 4.3 (L) 10/14/2024    WBC 3.8 (L) 10/12/2024    WBC 4.8 10/10/2024    HGB 9.3 (L) 10/14/2024    HCT 30.0 (L) 10/14/2024    .9 (H) 10/14/2024     (L) 10/14/2024     Lab Results   Component Value Date    NEUTROABS 2.65 10/14/2024    NEUTROABS 3.31 10/10/2024    NEUTROABS 3.29 10/09/2024     No results found for: \"CRPHS\"  Lab Results   Component Value Date    ALT 10 10/06/2024    AST 20 10/06/2024    ALKPHOS 84 10/06/2024    BILITOT 0.9 10/06/2024     Lab Results   Component Value Date/Time     10/14/2024 06:00 AM    K 4.5 10/14/2024 06:00 AM     10/14/2024 06:00 AM    CO2 24 10/14/2024 06:00 AM    BUN 21 10/14/2024 06:00 AM    CREATININE 0.7 10/14/2024 06:00 AM    CREATININE 0.7 10/10/2024 09:39 AM    CREATININE 0.7 10/09/2024 12:08 PM    LABGLOM >90 10/14/2024 06:00 AM    LABGLOM 29 12/20/2023 06:15 PM    GLUCOSE 135 10/14/2024 06:00 AM    CALCIUM 9.6 10/14/2024 06:00 AM    
145   K 4.5 4.2 3.9   * 111* 111*   CO2 24 28 32*   BUN 21 21 21   CREATININE 0.7 0.7 0.7   GLUCOSE 135* 117* 139*   CALCIUM 9.6 9.2 9.5         Recent Labs     10/14/24  0600 10/15/24  0645 10/16/24  0515   WBC 4.3* 2.6* 3.5*   RBC 2.73* 2.39* 2.59*   HGB 9.3* 8.2* 8.8*   HCT 30.0* 26.6* 28.8*   .9* 111.3* 111.2*   MCH 34.1 34.3 34.0   MCHC 31.0* 30.8* 30.6*   RDW 19.1* 18.7* 18.6*   * 79* 82*   MPV 10.8 10.8 10.7       Radiology:   US HEAD NECK SOFT TISSUE THYROID   Final Result   1. Slightly heterogeneous thyroid gland with a 1.4 cm spongiform nodule in   the right lobe and a 0.5 cm cyst in the left lobe.   2. Poorly visualized shadowing calculus in the right-side of the isthmus.   3. No FNA necessary.         MRI CERVICAL SPINE WO CONTRAST   Final Result   1. Significantly limited study due to patient motion artifact.   2. Disc bulges and disc herniations throughout the cervical spine result in   significant central canal stenoses, especially severe at C5-6. The exact   degree of stenosis is difficult to gauge due to motion artifact.   3. Edema in the left posterior paraspinal musculature of the lower cervical   and upper thoracic spine.  Findings may be related to spinal degenerative   changes, trauma or an infectious/inflammatory process.   4. The study should be repeated once the patient is either better able to lie   still or is sedated.         MRI BRAIN WO CONTRAST   Final Result   1. Limited study due to significant patient motion artifact.   2. No acute intracranial abnormality.   3. Mild volume loss with mild chronic microvascular ischemic changes.   4.  If clinically indicated, this study may be repeated once the patient is   either better able to lie still or is sedated.         FL MODIFIED BARIUM SWALLOW W VIDEO   Final Result   1.  Combined delay of oral and pharyngeal phase of swallow with aspiration of   nectar and honey consistency barium.      2.  Please see separate speech 
AROM/grasp     Grooming Maximal Assist   Max A bed-level Minimal Assist    UB Dressing Maximal Assist   Per previous session Max A Minimal Assist    LB Dressing Dependent  Dep Maximal Assist    Bathing Dependent Dep Maximal Assist    Toileting Dependent   Dep Maximal Assist    Bed Mobility  Partial roll: Dep Log roll: Dep to L-side. Rolling: mod A  Supine to sit: Maximal Assist   Sit to supine: Maximal Assist    Functional Transfers NT   DNT     Functional Mobility  NT  DNT     Balance Sitting: NT NT     Activity Tolerance P     Limited due to pain  Poor tolerance w/ light activity at bed-level F   Visual/  Perceptual wfl                          Hand Dominance right    Strength ROM Additional Info:    RUE   3-/5 proxima  3/5 distal Shoulder limited  Elbow wfl F  and F FMC/dexterity noted during ADL tasks      LUE 3/5 wfl F-  and P+ FMC/dexterity noted during ADL tasks      Hearing: wfl  Sensation: continue to assess  Tone: wfl  Edema: BUE / LE edema     Comments:  Cleared by RN to see pt. Upon arrival patient supine in bed and agreeable to OT session. At end of session, patient supine in bed with call light and phone within reach, all lines and tubes intact.  Overall patient demonstrated decreased independence, activity tolerance, global weakness, and safety during completion of ADL/mobility tasks.  Therapist facilitated pre-functional tasks & bed mobility to address safety awareness, implementation of fall prevention strategies, & functional engagement throughout ADLs. Pt required increased time throughout session 2/2 generalized pain. Pt would benefit from continued skilled OT to increase safety and independence with completion of ADL/IADL tasks for functional independence and quality of life.    Treatment: OT treatment provided this date includes:   ADL-  Instruction/training on safety and adapted techniques for completion of ADLs: Therapist facilitated & pt educated on activity modifications/adaptations 
care, counseling/discussion    UTI due to extended-spectrum beta lactamase (ESBL) producing Escherichia coli    Palliative care encounter  Resolved Problems:    * No resolved hospital problems. *    Acute metabolic encephalopathy  Suspected seizure-like activity  Transfer from [facility]  Neurology consult appreciated recommending MRI and EEG, results noted, await neuro recs following MRI with contrast  Continue Keppra and Ativan as needed  Seizure precaution  EEG ordered, follow    Hypotension  2/2 blood loss anemia   Rule out sepsis  Urine with GNR wound with GPC  Transfuse as clinically indicated  Multiple wounds, will reach out GS      Right posterior thigh infected ulcer  right heel pressure ulcer  sacral decubitus ulcer stage II  Recurrent UTI, chronic Jarquin  Status postdebridement on 17 and 26 September  Continue meropenem, follow culture  Wound care      None anion gap metabolic acidosis  Hyperkalemia  Continue to monitor potassium     Decompensated liver cirrhosis  Thrombocytopenia  Albumin 1.6  MELD score 15  Continue rifaximin and lactulose, restarted today, unclear why it had been on hold   Certainly contributing to altered mentation  Transfuse as clinically indicated   Continue to monitor hemoglobin and platelets     Nonambulatory bedbound, diabetes mellitus, obesity  Continue home medications     Dysphagia   SLP evaluation - modified diet ordered  Oral care     Poor prognosis overall  PalliativeCare consult appreciated  Continue discussion regarding plan of care with consideration for changes in CODE STATUS and possibly hospice as I do feel the patient qualifies    DISPOSITION: Pt continues to await patient placement and will monitor patients oral intake over the next 24 hours, if adequate for nutrition will be medically stable to dc pending placement.  Also await MRI with contrast results today    This note was generated by the epic EMR system/Dragon speech recognition and may contain 
face. Pt attempting to comb hair, having difficulty requiring assistance to comb hair and apply deodorant Minimal Assist    UB Dressing Maximal Assist   maxA  [facility] gown semi supine in bed Minimal Assist    LB Dressing Dependent   Dependent  [facility] socks Maximal Assist    Bathing Dependent Dependent    Maximal Assist    Toileting Dependent   Dependent Maximal Assist    Bed Mobility  Partial roll: Dep  Dependent  Rolling side to side in bed Rolling: mod A  Supine to sit: Maximal Assist   Sit to supine: Maximal Assist    Functional Transfers NT   DNT     Functional Mobility  NT  DNT     Balance Sitting: NT  DNT    Per pt's , has been bed bound for several months     Activity Tolerance P     Limited due to pain  Poor F   Visual/  Perceptual wfl                          Hand Dominance right    Strength ROM Additional Info:    RUE   3-/5 proxima  3/5 distal Shoulder limited  Elbow wfl F  and F FMC/dexterity noted during ADL tasks      LUE 3/5 wfl F-  and P+ FMC/dexterity noted during ADL tasks      Hearing: wfl  Sensation: continue to assess  Tone: wfl  Edema: BUE / LE edema         Comments/Treatment/Education: Upon arrival pt supine in bed, agreeable to therapy,  present, nursing present okaying pt to be seen this session. Pt was educated on role of OT, goals to be reached, importance of ROM to BUE's/hand/digits to increase of ROM and prevention of contractures. Pt encouraged to extended of digits and place of small towel between palm/digits to prevent on contracture and skin breakdown. Pt encouraged to keep repositioning self in bed to prevent of further pressure sores. At end of session, pt semi supine in bed,  still present, all lines and tubes intact, call light within reach.     Pt has made limited progress towards set goals.   Continue with current plan of care      Treatment Time In: 1:35pm           Treatment Time Out: 1:47pm                Treatment 
upright in bed, pt unable to fully bring BUE to mouth due to weakness     Grooming Maximal Assist   max A  To wash face, pt able to grasp wash cloth, [place] guidance using LUE, increased pain with RUE movement Minimal Assist    UB Dressing Maximal Assist   maxA  Critical access hospital gown semi supine in bed Minimal Assist    LB Dressing Dependent   Dependent  Critical access hospital socks Maximal Assist    Bathing Dependent Dependent    Maximal Assist    Toileting Dependent   Dependent Maximal Assist    Bed Mobility  Partial roll: Dep  Dependent  Rolling side to side in bed Rolling: mod A  Supine to sit: Maximal Assist   Sit to supine: Maximal Assist    Functional Transfers NT   DNT     Functional Mobility  NT  DNT     Balance Sitting: NT  DNT    Per pt's , has been bed bound for several months     Activity Tolerance P     Limited due to pain  Poor F   Visual/  Perceptual wfl                          Hand Dominance right    Strength ROM Additional Info:    RUE   3-/5 proxima  3/5 distal Shoulder limited  Elbow wfl F  and F FMC/dexterity noted during ADL tasks      LUE 3/5 wfl F-  and P+ FMC/dexterity noted during ADL tasks      Hearing: wfl  Sensation: continue to assess  Tone: wfl  Edema: BUE / LE edema         Comments/Treatment/Education: Upon arrival pt supine in bed, agreeable to therapy,  present, nursing present okaying pt to be seen this session. Pt was educated on role of OT, goals to be reached, importance of ROM to BUE's/hand/digits to increase of ROM and prevention of contractures. Pt demo'd poor tolerance for AAROM for 5 reps in all available planes. At end of session, pt semi supine in bed,  present, all lines and tubes intact, call light within reach.     Pt has made limited progress towards set goals.   Continue with current plan of care      Treatment Time In: 0825           Treatment Time Out: 0835               Treatment Charges: Mins Units   Ther Ex  10187     Manual Therapy 
  SLP evaluation   Oral care     DISPOSITION: Pending the above                                                                 This note was generated by the epic EMR system/Dragon speech recognition and may contain inherent errors or omissions not intended by the user. Grammatical errors, random word insertions, deletions, pronoun errors and incomplete sentences are occasional consequences of this technology due to software limitations. Not all errors are caught and corrected. If there are questions or concerns about the content of this note or information contained within the body of this dictation they should be addressed directly with the author for clarification.    Electronically signed by Wilfrido Haque MD on 10/6/2024 at 9:46 AM      
activity (HCC)    Palliative care by specialist    Goals of care, counseling/discussion    UTI due to extended-spectrum beta lactamase (ESBL) producing Escherichia coli    Palliative care encounter  Resolved Problems:    * No resolved hospital problems. *      Plan:  Acute metabolic encephalopathy with suspected seizure-like activity -MRI, EEG results noted, urology following.  Awaiting MRI C-spine under anesthesia, continue Keppra and seizure prophylaxis  Right posterior thigh infected ulcer, multiple pressure ulcers history of MRSA, MSSA Enterococcus and Pseudomonas -infectious disease following status post PICC line placement oral be receiving Merrem until 10/24  Decompensated liver cirrhosis continue rifaximin, lactulose  Thrombocytopenia secondary to above, continue to monitor  Type 2 diabetes  Obesity  Dysphagia -on modified diet  Insomnia - melatonin increased to 6 mg, prn vistaril, if ineffective, prn zolpidem   Neck tenderness/lump - no appreciable mass on exam but with tenderness, US neck ordered.     Disposition:for MRI of the C-spine tomorrow, Pre-cer to [place] approved.     NOTE: This report was transcribed using voice recognition software. Every effort was made to ensure accuracy; however, inadvertent computerized transcription errors may be present.  Electronically signed by Loraine Olvera MD on 10/15/2024 at 11:11 AM     
errors, random word insertions, deletions, pronoun errors and incomplete sentences are occasional consequences of this technology due to software limitations. Not all errors are caught and corrected. If there are questions or concerns about the content of this note or information contained within the body of this dictation they should be addressed directly with the author for clarification.    Electronically signed by Yasemin Cerda MD on 10/10/2024 at 9:01 AM      
lactamase (ESBL) producing Escherichia coli    Palliative care encounter  Resolved Problems:    * No resolved hospital problems. *      Plan:  Acute metabolic encephalopathy with suspected seizure-like activity -MRI, EEG results noted, urology following.  Awaiting MRI C-spine under anesthesia, continue Keppra and seizure prophylaxis  Right posterior thigh infected ulcer, multiple pressure ulcers history of MRSA, MSSA Enterococcus and Pseudomonas -infectious disease following status post PICC line placement oral be receiving Merrem until 10/24  Decompensated liver cirrhosis continue rifaximin, lactulose  Thrombocytopenia secondary to above, continue to monitor  Type 2 diabetes  Obesity  Dysphagia -on modified diet  Insomnia - melatonin increased to 6 mg, prn vistaril, if ineffective, prn zolpidem     Disposition: Awaiting for MRI pre-CERT to [place] pending    NOTE: This report was transcribed using voice recognition software. Every effort was made to ensure accuracy; however, inadvertent computerized transcription errors may be present.  Electronically signed by Loraine Olvera MD on 10/14/2024 at 10:29 AM     
    Lab Results   Component Value Date    MCDJOPRF49 >2000 (H) 10/05/2024     Lab Results   Component Value Date    FOLATE 17.0 10/05/2024     Lab Results   Component Value Date    TSH 5.75 (H) 10/04/2024     Radiology:  EEG: This abnormal study showed evidence of:  A moderate nonspecific encephalopathy  No seizures or epileptiform discharges were noted during this study.     The patient's records from referring provider and available information in the EHR was reviewed.    No imaging studies relative to this admission were available for review since they were done outside hospital.    Impression:  Metabolic encephalopathy 2/2 UTI    --- Progressive cognitive decline over the last 6+ ,history of liver cirrhosis, diabetes, morbid obesity, bedbound at baseline and recurrent skin abscesses     Concern for seizure activity  --- though not otherwise specified as to what this activity was. No asterixis/myoclonic type movements noted  --- EEG was negative for seizures     Recommendations:                                  Follow up MRI brain , cervical spine   On Keppra right now, though not sure she will need to continue   Neurology to follow       Electronically signed by CHIQUIS Pradhan CNP on 10/8/2024 at 10:37 AM          
obesity, bedbound at baseline and recurrent skin abscesses     Concern for seizure activity  --- though not otherwise specified as to what this activity was. No asterixis/myoclonic type movements noted  --- EEG was negative for seizures     Recommendations:                                  Follow up MRI cervical spine this time under anesthesia   On Keppra right now, though not sure she will need to continue   Neurology to follow       Electronically signed by CHIQUIS Pradhan CNP on 10/10/2024 at 12:01 PM          
Glucose 158 (H) 74 - 99 mg/dL   Blood Gas, Arterial    Collection Time: 10/05/24 12:45 PM   Result Value Ref Range    Date Analyzed 20241005     Time Analyzed 1250     Source: Blood Arterial     pH, Blood Gas 7.439 7.350 - 7.450    PCO2 32.4 (L) 35.0 - 45.0 mmHg    PO2 174.1 (H) 75.0 - 100.0 mmHg    HCO3 21.5 (L) 22.0 - 26.0 mmol/L    B.E. -2.2 -3.0 - 3.0 mmol/L    O2 Sat 99.2 (H) 92.0 - 98.5 %    O2Hb 97.8 (H) 94.0 - 97.0 %    COHb 1.0 0.0 - 1.5 %    MetHb 0.4 0.0 - 1.5 %    HHb 0.8 0.0 - 5.0 %    tHb (est) 9.1 (L) 11.5 - 16.5 g/dL    Mode RA     Date Of Collection 20241005     Time Collected 1245     Pt Temp 37.0 C     ID [id number]     Lab [id number]     Critical(s) Notified . No Critical Values    POCT Glucose    Collection Time: 10/05/24  4:11 PM   Result Value Ref Range    POC Glucose 159 (H) 74 - 99 mg/dL   Basic Metabolic Panel    Collection Time: 10/05/24  4:39 PM   Result Value Ref Range    Sodium 140 132 - 146 mmol/L    Potassium 5.0 3.5 - 5.0 mmol/L    Chloride 113 (H) 98 - 107 mmol/L    CO2 22 22 - 29 mmol/L    Anion Gap 5 (L) 7 - 16 mmol/L    Glucose 168 (H) 74 - 99 mg/dL    BUN 29 (H) 6 - 23 mg/dL    Creatinine 0.8 0.50 - 1.00 mg/dL    Est, Glom Filt Rate 83 >60 mL/min/1.73m2    Calcium 8.8 8.6 - 10.2 mg/dL   POCT Glucose    Collection Time: 10/05/24  7:44 PM   Result Value Ref Range    POC Glucose 171 (H) 74 - 99 mg/dL   POCT Glucose    Collection Time: 10/06/24  5:47 AM   Result Value Ref Range    POC Glucose 185 (H) 74 - 99 mg/dL   Comprehensive Metabolic Panel w/ Reflex to MG    Collection Time: 10/06/24  6:18 AM   Result Value Ref Range    Sodium 140 132 - 146 mmol/L    Potassium 4.7 3.5 - 5.0 mmol/L    Chloride 110 (H) 98 - 107 mmol/L    CO2 21 (L) 22 - 29 mmol/L    Anion Gap 9 7 - 16 mmol/L    Glucose 164 (H) 74 - 99 mg/dL    BUN 27 (H) 6 - 23 mg/dL    Creatinine 0.8 0.50 - 1.00 mg/dL    Est, Glom Filt Rate 88 >60 mL/min/1.73m2    Calcium 8.8 8.6 - 10.2 mg/dL    Total Protein 5.1 (L) 6.4 - 8.3

## 2024-10-16 NOTE — CARE COORDINATION
10/8/24  Transition of care update.  Patient is a transfer from [facility] for acute delirium and seizure like activity. Patient is still pending an MRI of the brain and cervical spine with neurology following. Patient on IV Vanc and IV Zosyn with Vanc being held at this time. Patient has a NG tube in place. Wound care to consult today. Patient has history of multiple wounds and had sacral wound debridement with general surgery in September. Patient also being followed by palliative.  Spoke with family regarding discharge plan.  Patients husbands first choice is an LTAC with [facility] following.  Pre-cert is being started for [facility]. [place]ANK[place]N referrals were also made to 1. [facility] who is not in network with insurance. 2. [facility] who denied and 3. [facility] with referral still pending as well as 4. [facility] who is following pending  bed availability when patient is stable for discharge. Patient is from [facility] [place] but family concerned with a return.  [facility] to follow.    Electronically signed by MICHAEL Gutierrez on 10/8/2024 at 11:45 AM   
Received case in transfer, MRI was ordered on 10/5 has not been completed. Spoke with [facility], who will reach out to expedite MRI.  Per notes, [facility] is following,  precert started 10/8, and precert remains pending.Cecy Dunlap, MSW, LSW    
Reviewed chart, met with pt and spouse, plan remains [facility]. Spoke with bela at [facility], preceret remains pending. Plan is [facility] after MRI is complete. Neurology will sign off once MRI cspine completed. Envelope, ambulance form, and completed 7000 in soft chart.  3pm precert obtained good through the end of 10/16.Cecy Dunlap, MSW, LSW    
Reviewed chart, precert remains pending. [place] ordered, pt to go on IV merrem for 14 days per ID. [facility] started precert, remains pending. Envelope, ambulance form, and completed 7000 in soft chart. Met with pt and daughter, verified discharge, they continue to be agreeable for [facility].Cecy Dunlap, MSW, LSW    
Reviewed chart, pt for MRI with anesthesia at 2pm today, neuro to read. Spoke with Dian at [facility], precert good through 10/20/24. Envelope, ambulance form, and completed 7000 in soft chart.  2:25pm discharge order noted, PAS for 6pm ambulance. Awaiting MRI results to be read. Updated spouse in [facility].Cecy Dunlap, MSW, LSW    
Reviewed chart, spoke with Sincere at [facility], pt was denied for LTAC. Spoke with Jordana at [facility], they have no beds. Spoke with Dian at [facility], they can accept and will start precert. Will need to know about IV antibiotics, messaged ID, they will round later and update this LSW. Called #[phone] for updates. Envelope, ambulance form in soft chart.Cecy Dunlap, MSW, LSW    
for SNF: Yes    Potential assistance Purchasing Medications:    Meds-to-Beds request: Yes    No Pharmacies Listed    Notes:    Factors facilitating achievement of predicted outcomes: Family support, Motivated, Cooperative, [place], and Has needed Durable Medical Equipment at home    Barriers to discharge: Confusion, Cognitive deficit, Upper extremity weakness, Lower extremity weakness, and Medical complications    Additional Case Management Notes: See above    The Plan for Transition of Care is related to the following treatment goals of Acute delirium [R41.0]  Seizure-like activity (HCC) [R56.9]    IF APPLICABLE: The Patient and/or patient representative Lissa and her family were provided with a choice of provider and agrees with the discharge plan. Freedom of choice list with basic dialogue that supports the patient's individualized plan of care/goals and shares the quality data associated with the providers was provided to:     Patient Representative Name:   , Marino    The Patient and/or Patient Representative Agree with the Discharge Plan?  yes    MICHAEL Gutierrez  Case Management Department  Ph: [phone] Fax:

## 2024-10-17 VITALS
RESPIRATION RATE: 23 BRPM | HEIGHT: 64 IN | DIASTOLIC BLOOD PRESSURE: 58 MMHG | TEMPERATURE: 97.4 F | HEART RATE: 97 BPM | BODY MASS INDEX: 44.73 KG/M2 | OXYGEN SATURATION: 98 % | SYSTOLIC BLOOD PRESSURE: 121 MMHG | WEIGHT: 262 LBS

## 2024-10-17 RX ADMIN — MICONAZOLE NITRATE: 20 OINTMENT TOPICAL at 00:50

## 2024-10-17 RX ADMIN — ANTI-FUNGAL POWDER MICONAZOLE NITRATE TALC FREE: 1.42 POWDER TOPICAL at 00:50

## 2024-10-17 NOTE — DISCHARGE SUMMARY
[facility] Hospitalist Physician Discharge Summary       [facility]  [address] [address]  [phone]          Activity level: As tolerated     Dispo: SNF    Condition on discharge: Stable     Patient ID:  Lissa Diggs  [id number]  59 y.o.  1965    Admit date: 10/4/2024    Discharge date and time:  10/17/2024  8:17 AM    Admission Diagnoses: Principal Problem:    Acute delirium  Active Problems:    Seizure-like activity (HCC)    Palliative care by specialist    Goals of care, counseling/discussion    UTI due to extended-spectrum beta lactamase (ESBL) producing Escherichia coli    Palliative care encounter    Acute metabolic encephalopathy    Cervical stenosis of spine  Resolved Problems:    * No resolved hospital problems. *      Discharge Diagnoses: Principal Problem:    Acute delirium  Active Problems:    Seizure-like activity (HCC)    Palliative care by specialist    Goals of care, counseling/discussion    UTI due to extended-spectrum beta lactamase (ESBL) producing Escherichia coli    Palliative care encounter    Acute metabolic encephalopathy    Cervical stenosis of spine  Resolved Problems:    * No resolved hospital problems. *      Consults:  IP CONSULT TO NEUROLOGY  IP CONSULT TO PALLIATIVE CARE  IP CONSULT TO VASCULAR ACCESS TEAM  IP CONSULT TO INFECTIOUS DISEASES  IP CONSULT TO VASCULAR ACCESS TEAM  IP CONSULT TO DIETITIAN  IP CONSULT TO ANESTHESIOLOGY  IP CONSULT TO VASCULAR ACCESS TEAM  IP CONSULT TO DIETITIAN    Hospital Course:   59 y.o. year old female with past medical history of hepatic cirrhosis on rifaximin, insulin-dependent diabetes mellitus, morbid obesity, bedbound, recurrent skin abscesses with history of MRSA, MSSA and enterococci, Pseudomonas was brought to the ER by  from nursing home due to altered mental status. Was initially brought to [facility].  at bedside states that she had fall 3 months ago

## 2024-10-29 ENCOUNTER — HOSPITAL ENCOUNTER (INPATIENT)
Age: 59
LOS: 5 days | Discharge: HOSPICE | DRG: 070 | End: 2024-11-04
Attending: EMERGENCY MEDICINE | Admitting: INTERNAL MEDICINE
Payer: COMMERCIAL

## 2024-10-29 ENCOUNTER — APPOINTMENT (OUTPATIENT)
Dept: CT IMAGING | Age: 59
DRG: 070 | End: 2024-10-29
Attending: EMERGENCY MEDICINE
Payer: COMMERCIAL

## 2024-10-29 ENCOUNTER — APPOINTMENT (OUTPATIENT)
Dept: GENERAL RADIOLOGY | Age: 59
DRG: 070 | End: 2024-10-29
Payer: COMMERCIAL

## 2024-10-29 DIAGNOSIS — R41.82 ALTERED MENTAL STATUS, UNSPECIFIED ALTERED MENTAL STATUS TYPE: Primary | ICD-10-CM

## 2024-10-29 LAB
ALBUMIN SERPL-MCNC: 1.9 G/DL (ref 3.5–5.2)
ALP SERPL-CCNC: 150 U/L (ref 35–104)
ALT SERPL-CCNC: 18 U/L (ref 0–32)
AMMONIA PLAS-SCNC: 33 UMOL/L (ref 11–51)
ANION GAP SERPL CALCULATED.3IONS-SCNC: 4 MMOL/L (ref 7–16)
AST SERPL-CCNC: 43 U/L (ref 0–31)
BACTERIA URNS QL MICRO: ABNORMAL
BILIRUB SERPL-MCNC: 1.5 MG/DL (ref 0–1.2)
BILIRUB UR QL STRIP: NEGATIVE
BUN SERPL-MCNC: 19 MG/DL (ref 6–23)
CALCIUM SERPL-MCNC: 9.8 MG/DL (ref 8.6–10.2)
CHLORIDE SERPL-SCNC: 111 MMOL/L (ref 98–107)
CLARITY UR: ABNORMAL
CO2 SERPL-SCNC: 30 MMOL/L (ref 22–29)
COLOR UR: YELLOW
CREAT SERPL-MCNC: 0.9 MG/DL (ref 0.5–1)
GFR, ESTIMATED: 69 ML/MIN/1.73M2
GLUCOSE SERPL-MCNC: 137 MG/DL (ref 74–99)
GLUCOSE UR STRIP-MCNC: NEGATIVE MG/DL
HGB UR QL STRIP.AUTO: ABNORMAL
KETONES UR STRIP-MCNC: ABNORMAL MG/DL
LEUKOCYTE ESTERASE UR QL STRIP: ABNORMAL
NITRITE UR QL STRIP: NEGATIVE
PH UR STRIP: 6 [PH] (ref 5–9)
POTASSIUM SERPL-SCNC: 3.8 MMOL/L (ref 3.5–5)
PROT SERPL-MCNC: 5.5 G/DL (ref 6.4–8.3)
PROT UR STRIP-MCNC: 100 MG/DL
RBC #/AREA URNS HPF: ABNORMAL /HPF
SODIUM SERPL-SCNC: 145 MMOL/L (ref 132–146)
SP GR UR STRIP: 1.02 (ref 1–1.03)
TROPONIN I SERPL HS-MCNC: 88 NG/L (ref 0–9)
UROBILINOGEN UR STRIP-ACNC: 0.2 EU/DL (ref 0–1)
WBC #/AREA URNS HPF: ABNORMAL /HPF

## 2024-10-29 PROCEDURE — 84484 ASSAY OF TROPONIN QUANT: CPT

## 2024-10-29 PROCEDURE — 83605 ASSAY OF LACTIC ACID: CPT

## 2024-10-29 PROCEDURE — 93005 ELECTROCARDIOGRAM TRACING: CPT | Performed by: EMERGENCY MEDICINE

## 2024-10-29 PROCEDURE — 87040 BLOOD CULTURE FOR BACTERIA: CPT

## 2024-10-29 PROCEDURE — 71045 X-RAY EXAM CHEST 1 VIEW: CPT

## 2024-10-29 PROCEDURE — 99285 EMERGENCY DEPT VISIT HI MDM: CPT

## 2024-10-29 PROCEDURE — 85025 COMPLETE CBC W/AUTO DIFF WBC: CPT

## 2024-10-29 PROCEDURE — 82140 ASSAY OF AMMONIA: CPT

## 2024-10-29 PROCEDURE — 70450 CT HEAD/BRAIN W/O DYE: CPT

## 2024-10-29 PROCEDURE — 80053 COMPREHEN METABOLIC PANEL: CPT

## 2024-10-29 PROCEDURE — 81001 URINALYSIS AUTO W/SCOPE: CPT

## 2024-10-29 PROCEDURE — 87154 CUL TYP ID BLD PTHGN 6+ TRGT: CPT

## 2024-10-29 ASSESSMENT — PAIN - FUNCTIONAL ASSESSMENT: PAIN_FUNCTIONAL_ASSESSMENT: NONE - DENIES PAIN

## 2024-10-30 ENCOUNTER — APPOINTMENT (OUTPATIENT)
Dept: CT IMAGING | Age: 59
DRG: 070 | End: 2024-10-30
Payer: COMMERCIAL

## 2024-10-30 PROBLEM — R41.82 AMS (ALTERED MENTAL STATUS): Status: ACTIVE | Noted: 2024-10-30

## 2024-10-30 LAB
BASOPHILS # BLD: 0.04 K/UL (ref 0–0.2)
BASOPHILS NFR BLD: 1 % (ref 0–2)
EKG ATRIAL RATE: 98 BPM
EKG P AXIS: -23 DEGREES
EKG P-R INTERVAL: 140 MS
EKG Q-T INTERVAL: 444 MS
EKG QRS DURATION: 134 MS
EKG QTC CALCULATION (BAZETT): 566 MS
EKG R AXIS: -6 DEGREES
EKG T AXIS: -24 DEGREES
EKG VENTRICULAR RATE: 98 BPM
EOSINOPHIL # BLD: 0.12 K/UL (ref 0.05–0.5)
EOSINOPHILS RELATIVE PERCENT: 3 % (ref 0–6)
ERYTHROCYTE [DISTWIDTH] IN BLOOD BY AUTOMATED COUNT: 18.4 % (ref 11.5–15)
GLUCOSE BLD-MCNC: 125 MG/DL (ref 74–99)
GLUCOSE BLD-MCNC: 159 MG/DL (ref 74–99)
HCT VFR BLD AUTO: 30 % (ref 34–48)
HGB BLD-MCNC: 9 G/DL (ref 11.5–15.5)
LACTATE BLDV-SCNC: 2.5 MMOL/L (ref 0.5–1.9)
LACTATE BLDV-SCNC: 2.9 MMOL/L (ref 0.5–1.9)
LYMPHOCYTES NFR BLD: 0.12 K/UL (ref 1.5–4)
LYMPHOCYTES RELATIVE PERCENT: 3 % (ref 20–42)
MCH RBC QN AUTO: 33.3 PG (ref 26–35)
MCHC RBC AUTO-ENTMCNC: 30 G/DL (ref 32–34.5)
MCV RBC AUTO: 111.1 FL (ref 80–99.9)
MONOCYTES NFR BLD: 0.33 K/UL (ref 0.1–0.95)
MONOCYTES NFR BLD: 7 % (ref 2–12)
NEUTROPHILS NFR BLD: 87 % (ref 43–80)
NEUTS SEG NFR BLD: 4.09 K/UL (ref 1.8–7.3)
PLATELET # BLD AUTO: 61 K/UL (ref 130–450)
PLATELET CONFIRMATION: NORMAL
PMV BLD AUTO: 9.9 FL (ref 7–12)
RBC # BLD AUTO: 2.7 M/UL (ref 3.5–5.5)
RBC # BLD: ABNORMAL 10*6/UL
TROPONIN I SERPL HS-MCNC: 88 NG/L (ref 0–9)
WBC OTHER # BLD: 4.7 K/UL (ref 4.5–11.5)

## 2024-10-30 PROCEDURE — 93010 ELECTROCARDIOGRAM REPORT: CPT | Performed by: INTERNAL MEDICINE

## 2024-10-30 PROCEDURE — 99223 1ST HOSP IP/OBS HIGH 75: CPT | Performed by: INTERNAL MEDICINE

## 2024-10-30 PROCEDURE — 2580000003 HC RX 258: Performed by: INTERNAL MEDICINE

## 2024-10-30 PROCEDURE — 99223 1ST HOSP IP/OBS HIGH 75: CPT | Performed by: SURGERY

## 2024-10-30 PROCEDURE — 83605 ASSAY OF LACTIC ACID: CPT

## 2024-10-30 PROCEDURE — 6370000000 HC RX 637 (ALT 250 FOR IP): Performed by: INTERNAL MEDICINE

## 2024-10-30 PROCEDURE — 6360000002 HC RX W HCPCS: Performed by: INTERNAL MEDICINE

## 2024-10-30 PROCEDURE — 6360000004 HC RX CONTRAST MEDICATION: Performed by: RADIOLOGY

## 2024-10-30 PROCEDURE — 87086 URINE CULTURE/COLONY COUNT: CPT

## 2024-10-30 PROCEDURE — 6370000000 HC RX 637 (ALT 250 FOR IP): Performed by: STUDENT IN AN ORGANIZED HEALTH CARE EDUCATION/TRAINING PROGRAM

## 2024-10-30 PROCEDURE — 82962 GLUCOSE BLOOD TEST: CPT

## 2024-10-30 PROCEDURE — 74177 CT ABD & PELVIS W/CONTRAST: CPT

## 2024-10-30 PROCEDURE — 87106 FUNGI IDENTIFICATION YEAST: CPT

## 2024-10-30 PROCEDURE — 2060000000 HC ICU INTERMEDIATE R&B

## 2024-10-30 RX ORDER — OXYCODONE HYDROCHLORIDE 5 MG/1
10 TABLET ORAL EVERY 4 HOURS PRN
Status: DISCONTINUED | OUTPATIENT
Start: 2024-10-30 | End: 2024-11-04 | Stop reason: HOSPADM

## 2024-10-30 RX ORDER — SODIUM CHLORIDE 0.9 % (FLUSH) 0.9 %
5-40 SYRINGE (ML) INJECTION PRN
Status: DISCONTINUED | OUTPATIENT
Start: 2024-10-30 | End: 2024-11-04 | Stop reason: HOSPADM

## 2024-10-30 RX ORDER — ACETAMINOPHEN 325 MG/1
650 TABLET ORAL EVERY 6 HOURS PRN
Status: DISCONTINUED | OUTPATIENT
Start: 2024-10-30 | End: 2024-11-04 | Stop reason: HOSPADM

## 2024-10-30 RX ORDER — ONDANSETRON 4 MG/1
4 TABLET, ORALLY DISINTEGRATING ORAL EVERY 6 HOURS PRN
COMMUNITY

## 2024-10-30 RX ORDER — LACTULOSE 10 G/15ML
20 SOLUTION ORAL 3 TIMES DAILY
Status: DISCONTINUED | OUTPATIENT
Start: 2024-10-30 | End: 2024-11-04 | Stop reason: HOSPADM

## 2024-10-30 RX ORDER — THIAMINE HYDROCHLORIDE 100 MG/ML
100 INJECTION, SOLUTION INTRAMUSCULAR; INTRAVENOUS DAILY
Status: DISCONTINUED | OUTPATIENT
Start: 2024-10-30 | End: 2024-11-04 | Stop reason: HOSPADM

## 2024-10-30 RX ORDER — ENOXAPARIN SODIUM 100 MG/ML
30 INJECTION SUBCUTANEOUS 2 TIMES DAILY
Status: DISCONTINUED | OUTPATIENT
Start: 2024-10-30 | End: 2024-11-04 | Stop reason: HOSPADM

## 2024-10-30 RX ORDER — MAGNESIUM SULFATE IN WATER 40 MG/ML
2000 INJECTION, SOLUTION INTRAVENOUS PRN
Status: DISCONTINUED | OUTPATIENT
Start: 2024-10-30 | End: 2024-11-04 | Stop reason: HOSPADM

## 2024-10-30 RX ORDER — SODIUM CHLORIDE 0.9 % (FLUSH) 0.9 %
5-40 SYRINGE (ML) INJECTION EVERY 12 HOURS SCHEDULED
Status: DISCONTINUED | OUTPATIENT
Start: 2024-10-30 | End: 2024-11-04 | Stop reason: HOSPADM

## 2024-10-30 RX ORDER — MEROPENEM 1 G/1
1000 INJECTION, POWDER, FOR SOLUTION INTRAVENOUS EVERY 8 HOURS
COMMUNITY
Start: 2024-10-18 | End: 2024-10-31

## 2024-10-30 RX ORDER — SODIUM CHLORIDE 9 MG/ML
INJECTION, SOLUTION INTRAVENOUS CONTINUOUS
Status: DISCONTINUED | OUTPATIENT
Start: 2024-10-30 | End: 2024-10-30

## 2024-10-30 RX ORDER — ACETAMINOPHEN 650 MG/1
650 SUPPOSITORY RECTAL EVERY 6 HOURS PRN
Status: DISCONTINUED | OUTPATIENT
Start: 2024-10-30 | End: 2024-11-04 | Stop reason: HOSPADM

## 2024-10-30 RX ORDER — ONDANSETRON 2 MG/ML
4 INJECTION INTRAMUSCULAR; INTRAVENOUS EVERY 6 HOURS PRN
Status: DISCONTINUED | OUTPATIENT
Start: 2024-10-30 | End: 2024-11-04 | Stop reason: HOSPADM

## 2024-10-30 RX ORDER — LANOLIN ALCOHOL/MO/W.PET/CERES
6 CREAM (GRAM) TOPICAL NIGHTLY
Status: DISCONTINUED | OUTPATIENT
Start: 2024-10-30 | End: 2024-11-04 | Stop reason: HOSPADM

## 2024-10-30 RX ORDER — POTASSIUM CHLORIDE 1500 MG/1
40 TABLET, EXTENDED RELEASE ORAL PRN
Status: DISCONTINUED | OUTPATIENT
Start: 2024-10-30 | End: 2024-11-04 | Stop reason: HOSPADM

## 2024-10-30 RX ORDER — INSULIN LISPRO 100 [IU]/ML
0-4 INJECTION, SOLUTION INTRAVENOUS; SUBCUTANEOUS
Status: DISCONTINUED | OUTPATIENT
Start: 2024-10-30 | End: 2024-11-02 | Stop reason: SDUPTHER

## 2024-10-30 RX ORDER — POTASSIUM CHLORIDE 7.45 MG/ML
10 INJECTION INTRAVENOUS PRN
Status: DISCONTINUED | OUTPATIENT
Start: 2024-10-30 | End: 2024-11-04 | Stop reason: HOSPADM

## 2024-10-30 RX ORDER — INSULIN LISPRO 100 [IU]/ML
0-15 INJECTION, SOLUTION INTRAVENOUS; SUBCUTANEOUS
COMMUNITY

## 2024-10-30 RX ORDER — PANTOPRAZOLE SODIUM 40 MG/10ML
40 INJECTION, POWDER, LYOPHILIZED, FOR SOLUTION INTRAVENOUS ONCE
Status: COMPLETED | OUTPATIENT
Start: 2024-10-30 | End: 2024-10-30

## 2024-10-30 RX ORDER — GLUCAGON 1 MG/ML
1 KIT INJECTION PRN
Status: DISCONTINUED | OUTPATIENT
Start: 2024-10-30 | End: 2024-11-02 | Stop reason: SDUPTHER

## 2024-10-30 RX ORDER — OXYCODONE HYDROCHLORIDE 10 MG/1
10 TABLET ORAL EVERY 4 HOURS PRN
COMMUNITY

## 2024-10-30 RX ORDER — SODIUM CHLORIDE 9 MG/ML
INJECTION, SOLUTION INTRAVENOUS PRN
Status: DISCONTINUED | OUTPATIENT
Start: 2024-10-30 | End: 2024-11-04 | Stop reason: HOSPADM

## 2024-10-30 RX ORDER — SODIUM CHLORIDE 0.9 % (FLUSH) 0.9 %
10 SYRINGE (ML) INJECTION
Status: ACTIVE | OUTPATIENT
Start: 2024-10-30 | End: 2024-10-31

## 2024-10-30 RX ORDER — ONDANSETRON 4 MG/1
4 TABLET, ORALLY DISINTEGRATING ORAL EVERY 8 HOURS PRN
Status: DISCONTINUED | OUTPATIENT
Start: 2024-10-30 | End: 2024-11-04 | Stop reason: HOSPADM

## 2024-10-30 RX ORDER — HYDROXYZINE PAMOATE 50 MG/1
50 CAPSULE ORAL 3 TIMES DAILY PRN
COMMUNITY

## 2024-10-30 RX ORDER — SODIUM CHLORIDE, SODIUM LACTATE, POTASSIUM CHLORIDE, CALCIUM CHLORIDE 600; 310; 30; 20 MG/100ML; MG/100ML; MG/100ML; MG/100ML
INJECTION, SOLUTION INTRAVENOUS CONTINUOUS
Status: ACTIVE | OUTPATIENT
Start: 2024-10-30 | End: 2024-10-31

## 2024-10-30 RX ORDER — DEXTROSE MONOHYDRATE 100 MG/ML
INJECTION, SOLUTION INTRAVENOUS CONTINUOUS PRN
Status: DISCONTINUED | OUTPATIENT
Start: 2024-10-30 | End: 2024-11-02 | Stop reason: SDUPTHER

## 2024-10-30 RX ORDER — ZINC SULFATE 50(220)MG
50 CAPSULE ORAL DAILY
Status: DISCONTINUED | OUTPATIENT
Start: 2024-10-30 | End: 2024-11-04 | Stop reason: HOSPADM

## 2024-10-30 RX ORDER — SPIRONOLACTONE 25 MG/1
100 TABLET ORAL DAILY
Status: DISCONTINUED | OUTPATIENT
Start: 2024-10-30 | End: 2024-11-04 | Stop reason: HOSPADM

## 2024-10-30 RX ORDER — ALLOPURINOL 100 MG/1
100 TABLET ORAL 2 TIMES DAILY
Status: DISCONTINUED | OUTPATIENT
Start: 2024-10-30 | End: 2024-11-04 | Stop reason: HOSPADM

## 2024-10-30 RX ORDER — HEPARIN SODIUM,PORCINE 10 UNIT/ML
5 VIAL (ML) INTRAVENOUS 3 TIMES DAILY
COMMUNITY

## 2024-10-30 RX ORDER — POLYETHYLENE GLYCOL 3350 17 G/17G
17 POWDER, FOR SOLUTION ORAL DAILY PRN
Status: DISCONTINUED | OUTPATIENT
Start: 2024-10-30 | End: 2024-11-04 | Stop reason: HOSPADM

## 2024-10-30 RX ORDER — FLUORIDE TOOTHPASTE
15 TOOTHPASTE DENTAL 3 TIMES DAILY PRN
COMMUNITY

## 2024-10-30 RX ORDER — HYDROXYZINE PAMOATE 25 MG/1
25 CAPSULE ORAL 3 TIMES DAILY PRN
Status: DISCONTINUED | OUTPATIENT
Start: 2024-10-30 | End: 2024-11-04 | Stop reason: HOSPADM

## 2024-10-30 RX ORDER — IOPAMIDOL 755 MG/ML
75 INJECTION, SOLUTION INTRAVASCULAR
Status: COMPLETED | OUTPATIENT
Start: 2024-10-30 | End: 2024-10-30

## 2024-10-30 RX ORDER — LEVETIRACETAM 500 MG/1
500 TABLET ORAL 2 TIMES DAILY
Status: DISCONTINUED | OUTPATIENT
Start: 2024-10-30 | End: 2024-11-04 | Stop reason: HOSPADM

## 2024-10-30 RX ORDER — SPIRONOLACTONE 100 MG/1
100 TABLET, FILM COATED ORAL DAILY
COMMUNITY

## 2024-10-30 RX ADMIN — SODIUM CHLORIDE, PRESERVATIVE FREE 10 ML: 5 INJECTION INTRAVENOUS at 21:02

## 2024-10-30 RX ADMIN — LACTULOSE 20 G: 20 SOLUTION ORAL at 20:16

## 2024-10-30 RX ADMIN — SPIRONOLACTONE 100 MG: 25 TABLET ORAL at 12:04

## 2024-10-30 RX ADMIN — ALLOPURINOL 100 MG: 100 TABLET ORAL at 12:04

## 2024-10-30 RX ADMIN — RIFAXIMIN 400 MG: 200 TABLET ORAL at 13:58

## 2024-10-30 RX ADMIN — Medication 6 MG: at 02:08

## 2024-10-30 RX ADMIN — IOPAMIDOL 75 ML: 755 INJECTION, SOLUTION INTRAVENOUS at 13:29

## 2024-10-30 RX ADMIN — LEVETIRACETAM 500 MG: 500 TABLET, FILM COATED ORAL at 20:17

## 2024-10-30 RX ADMIN — SODIUM CHLORIDE, PRESERVATIVE FREE 10 ML: 5 INJECTION INTRAVENOUS at 09:00

## 2024-10-30 RX ADMIN — Medication 50 MG: at 11:42

## 2024-10-30 RX ADMIN — ALLOPURINOL 100 MG: 100 TABLET ORAL at 02:08

## 2024-10-30 RX ADMIN — SODIUM CHLORIDE, POTASSIUM CHLORIDE, SODIUM LACTATE AND CALCIUM CHLORIDE: 600; 310; 30; 20 INJECTION, SOLUTION INTRAVENOUS at 06:14

## 2024-10-30 RX ADMIN — OXYCODONE HYDROCHLORIDE 10 MG: 5 TABLET ORAL at 06:09

## 2024-10-30 RX ADMIN — THIAMINE HYDROCHLORIDE 100 MG: 100 INJECTION, SOLUTION INTRAMUSCULAR; INTRAVENOUS at 11:42

## 2024-10-30 RX ADMIN — PANTOPRAZOLE SODIUM 40 MG: 40 INJECTION, POWDER, FOR SOLUTION INTRAVENOUS at 06:13

## 2024-10-30 RX ADMIN — RIFAXIMIN 400 MG: 200 TABLET ORAL at 11:39

## 2024-10-30 RX ADMIN — RIFAXIMIN 400 MG: 200 TABLET ORAL at 20:17

## 2024-10-30 RX ADMIN — LEVETIRACETAM 500 MG: 500 TABLET, FILM COATED ORAL at 11:39

## 2024-10-30 RX ADMIN — LEVETIRACETAM 500 MG: 500 TABLET, FILM COATED ORAL at 02:08

## 2024-10-30 RX ADMIN — OXYCODONE HYDROCHLORIDE 10 MG: 5 TABLET ORAL at 16:12

## 2024-10-30 RX ADMIN — ALLOPURINOL 100 MG: 100 TABLET ORAL at 20:17

## 2024-10-30 RX ADMIN — HYDROMORPHONE HYDROCHLORIDE 0.5 MG: 1 INJECTION, SOLUTION INTRAMUSCULAR; INTRAVENOUS; SUBCUTANEOUS at 01:59

## 2024-10-30 RX ADMIN — OXYCODONE HYDROCHLORIDE 10 MG: 5 TABLET ORAL at 20:16

## 2024-10-30 RX ADMIN — HYDROXYZINE PAMOATE 25 MG: 25 CAPSULE ORAL at 20:55

## 2024-10-30 RX ADMIN — LACTULOSE 20 G: 20 SOLUTION ORAL at 11:43

## 2024-10-30 RX ADMIN — ENOXAPARIN SODIUM 30 MG: 100 INJECTION SUBCUTANEOUS at 02:11

## 2024-10-30 ASSESSMENT — PAIN DESCRIPTION - DESCRIPTORS
DESCRIPTORS: ACHING;STABBING
DESCRIPTORS: ACHING;SORE
DESCRIPTORS: ACHING;SORE

## 2024-10-30 ASSESSMENT — PAIN DESCRIPTION - LOCATION
LOCATION: KNEE;LEG
LOCATION: BACK;LEG
LOCATION: BUTTOCKS
LOCATION: BUTTOCKS
LOCATION: GENERALIZED

## 2024-10-30 ASSESSMENT — PAIN DESCRIPTION - ORIENTATION: ORIENTATION: LOWER;LEFT;RIGHT

## 2024-10-30 ASSESSMENT — PAIN SCALES - GENERAL
PAINLEVEL_OUTOF10: 10
PAINLEVEL_OUTOF10: 5
PAINLEVEL_OUTOF10: 2
PAINLEVEL_OUTOF10: 6
PAINLEVEL_OUTOF10: 9
PAINLEVEL_OUTOF10: 4

## 2024-10-30 ASSESSMENT — LIFESTYLE VARIABLES
HOW MANY STANDARD DRINKS CONTAINING ALCOHOL DO YOU HAVE ON A TYPICAL DAY: PATIENT DOES NOT DRINK
HOW OFTEN DO YOU HAVE A DRINK CONTAINING ALCOHOL: NEVER

## 2024-10-30 NOTE — CARE COORDINATION
Social Work / Transition of Care:    Pt presented to the ED 10/29 secondary to increased ammonia levels from [facility].  Pt recently discharged from this hospital on 10/16.  Pt is admitted inpatient with altered mental status.  Pt has a consult for general surgery to evaluate for possible PEG tube placement.    Per liaison at [facility], pt is able to return to facility and will need PT/OT evals and insurance authorization prior to discharge.    SW/CM to follow.

## 2024-10-30 NOTE — H&P
[facility] Hospitalist Group History and Physical      CHIEF COMPLAINT:  Encephalopathy   Hx is obtained form per     History of Present Illness:    Patient is a 65-year-old female with significant medical history hepatic cirrhosis, T2DM, morbid obesity, recurrent skin abscesses, history of MRSA MSSA and enterococci, chronically bedbound who presents from a nursing home after  noted patient became more altered with decreased oral intake.  The ED, labs were significant for lactic acid 2.5, albumin 1.9, Trope 88>88, WBC 4.7, Hgb 9.0, .  Patient is being admitted for further evaluation and possible PEG placement.       Informant(s) for H&P:    REVIEW OF SYSTEMS:  A comprehensive review of systems was negative except for: what is in the HPI      PMH:  Past Medical History:   Diagnosis Date    A-fib (HCC)     DM (diabetes mellitus) (HCC)     Falls     HF (heart failure) (HCC)     MRSA (methicillin resistant Staphylococcus aureus)     MOYA (nonalcoholic steatohepatitis)     MAY (obstructive sleep apnea)     Thrombocytopenia (HCC)     Urinary retention        Surgical History:  Past Surgical History:   Procedure Laterality Date     SECTION      CYST REMOVAL      throat       Medications Prior to Admission:    Prior to Admission medications    Medication Sig Start Date End Date Taking? Authorizing Provider   melatonin 3 MG TABS tablet Take 2 tablets by mouth nightly 10/16/24   Loraine Shepard MD   insulin lispro (HUMALOG,ADMELOG) 100 UNIT/ML SOLN injection vial Inject 0-4 Units into the skin nightly 10/11/24 11/10/24  Yasemin Cerda MD   insulin lispro (HUMALOG,ADMELOG) 100 UNIT/ML SOLN injection vial Inject 0-8 Units into the skin 3 times daily (with meals) 10/11/24   Yasemin Cerda MD   lactulose (CHRONULAC) 10 GM/15ML solution Take 30 mLs by mouth 3 times daily 10/11/24 11/10/24  Yasemin Cerda MD   levETIRAcetam (KEPPRA) 500 MG tablet Take 1 tablet by mouth 2 times daily

## 2024-10-30 NOTE — ED PROVIDER NOTES
HPI:  10/29/24, Time: 8:38 PM EDT         Lissa Diggs is a 59 y.o. female history of atrial fibrillation diabetes history of heart failure history of thrombocytopenia urinary retention obstructive sleep apnea history of MRSA history of nonalcoholic steatohepatitis presenting to the ED for altered mental status, beginning 1 day ago.  The complaint has been persistent, moderate in severity, and worsened by nothing.  Patient was brought in by EMS due to altered mental status patient currently at nursing home.  Patient unable to give history.  Patient only oriented to person.  There is no history of fever there is no history of vomiting.  Patient reportedly had abnormal labs as well as elevated ammonia level.  Patient having no history of black or tarry stools or hematemesis    ROS:   Pertinent positives and negatives are stated within HPI, all other systems reviewed and are negative.  --------------------------------------------- PAST HISTORY ---------------------------------------------  Past Medical History:  has a past medical history of A-fib (HCC), DM (diabetes mellitus) (HCC), Falls, HF (heart failure) (HCC), MRSA (methicillin resistant Staphylococcus aureus), MOYA (nonalcoholic steatohepatitis), MAY (obstructive sleep apnea), Thrombocytopenia (HCC), and Urinary retention.    Past Surgical History:  has a past surgical history that includes cyst removal and  section.    Social History:  reports that she has never smoked. She does not have any smokeless tobacco history on file. She reports that she does not drink alcohol and does not use drugs.    Family History: family history is not on file.     The patient’s home medications have been reviewed.    Allergies: Patient has no known allergies.    ---------------------------------------------------PHYSICAL EXAM--------------------------------------    Constitutional/General: Alert and oriented to person only  Head: Normocephalic and atraumatic  Eyes:

## 2024-10-30 NOTE — ED NOTES
Full linen change provided at this time, patient cleaned up from stool incontinence. Patient had a large amount of diarrhea. Patient also turned to her left side using turning pillows.

## 2024-10-31 ENCOUNTER — ANESTHESIA EVENT (OUTPATIENT)
Dept: ENDOSCOPY | Age: 59
End: 2024-10-31
Payer: COMMERCIAL

## 2024-10-31 ENCOUNTER — ANESTHESIA (OUTPATIENT)
Dept: ENDOSCOPY | Age: 59
End: 2024-10-31
Payer: COMMERCIAL

## 2024-10-31 PROBLEM — E43 SEVERE PROTEIN-CALORIE MALNUTRITION (HCC): Status: ACTIVE | Noted: 2024-10-31

## 2024-10-31 LAB
ABO + RH BLD: NORMAL
ANION GAP SERPL CALCULATED.3IONS-SCNC: 3 MMOL/L (ref 7–16)
ANION GAP SERPL CALCULATED.3IONS-SCNC: 6 MMOL/L (ref 7–16)
ARM BAND NUMBER: NORMAL
BLOOD BANK SAMPLE EXPIRATION: NORMAL
BLOOD GROUP ANTIBODIES SERPL: NEGATIVE
BUN SERPL-MCNC: 21 MG/DL (ref 6–23)
BUN SERPL-MCNC: 23 MG/DL (ref 6–23)
CALCIUM SERPL-MCNC: 10.1 MG/DL (ref 8.6–10.2)
CALCIUM SERPL-MCNC: 10.7 MG/DL (ref 8.6–10.2)
CHLORIDE SERPL-SCNC: 113 MMOL/L (ref 98–107)
CHLORIDE SERPL-SCNC: 113 MMOL/L (ref 98–107)
CO2 SERPL-SCNC: 29 MMOL/L (ref 22–29)
CO2 SERPL-SCNC: 31 MMOL/L (ref 22–29)
CREAT SERPL-MCNC: 1.1 MG/DL (ref 0.5–1)
CREAT SERPL-MCNC: 1.1 MG/DL (ref 0.5–1)
ERYTHROCYTE [DISTWIDTH] IN BLOOD BY AUTOMATED COUNT: 18.3 % (ref 11.5–15)
GFR, ESTIMATED: 58 ML/MIN/1.73M2
GFR, ESTIMATED: 60 ML/MIN/1.73M2
GLUCOSE BLD-MCNC: 145 MG/DL (ref 74–99)
GLUCOSE BLD-MCNC: 156 MG/DL (ref 74–99)
GLUCOSE BLD-MCNC: 172 MG/DL (ref 74–99)
GLUCOSE BLD-MCNC: 175 MG/DL (ref 74–99)
GLUCOSE BLD-MCNC: 187 MG/DL (ref 74–99)
GLUCOSE SERPL-MCNC: 149 MG/DL (ref 74–99)
GLUCOSE SERPL-MCNC: 161 MG/DL (ref 74–99)
HCT VFR BLD AUTO: 33 % (ref 34–48)
HGB BLD-MCNC: 9.9 G/DL (ref 11.5–15.5)
INR PPP: 1.9
MCH RBC QN AUTO: 33.8 PG (ref 26–35)
MCHC RBC AUTO-ENTMCNC: 30 G/DL (ref 32–34.5)
MCV RBC AUTO: 112.6 FL (ref 80–99.9)
MICROORGANISM SPEC CULT: ABNORMAL
PLATELET # BLD AUTO: 54 K/UL (ref 130–450)
PLATELET CONFIRMATION: NORMAL
PMV BLD AUTO: 11 FL (ref 7–12)
POTASSIUM SERPL-SCNC: 4.2 MMOL/L (ref 3.5–5)
POTASSIUM SERPL-SCNC: 4.3 MMOL/L (ref 3.5–5)
PROTHROMBIN TIME: 20.5 SEC (ref 9.3–12.4)
RBC # BLD AUTO: 2.93 M/UL (ref 3.5–5.5)
SERVICE CMNT-IMP: ABNORMAL
SODIUM SERPL-SCNC: 147 MMOL/L (ref 132–146)
SODIUM SERPL-SCNC: 148 MMOL/L (ref 132–146)
SPECIMEN DESCRIPTION: ABNORMAL
WBC OTHER # BLD: 4.7 K/UL (ref 4.5–11.5)

## 2024-10-31 PROCEDURE — 2580000003 HC RX 258: Performed by: NURSE ANESTHETIST, CERTIFIED REGISTERED

## 2024-10-31 PROCEDURE — 3609013300 HC EGD TUBE PLACEMENT: Performed by: SURGERY

## 2024-10-31 PROCEDURE — 85610 PROTHROMBIN TIME: CPT

## 2024-10-31 PROCEDURE — 86901 BLOOD TYPING SEROLOGIC RH(D): CPT

## 2024-10-31 PROCEDURE — 6360000002 HC RX W HCPCS: Performed by: NURSE ANESTHETIST, CERTIFIED REGISTERED

## 2024-10-31 PROCEDURE — 86900 BLOOD TYPING SEROLOGIC ABO: CPT

## 2024-10-31 PROCEDURE — 97530 THERAPEUTIC ACTIVITIES: CPT

## 2024-10-31 PROCEDURE — 0DH63UZ INSERTION OF FEEDING DEVICE INTO STOMACH, PERCUTANEOUS APPROACH: ICD-10-PCS | Performed by: SURGERY

## 2024-10-31 PROCEDURE — 2709999900 HC NON-CHARGEABLE SUPPLY: Performed by: SURGERY

## 2024-10-31 PROCEDURE — 80048 BASIC METABOLIC PNL TOTAL CA: CPT

## 2024-10-31 PROCEDURE — 6360000002 HC RX W HCPCS: Performed by: SURGERY

## 2024-10-31 PROCEDURE — 2700000000 HC OXYGEN THERAPY PER DAY

## 2024-10-31 PROCEDURE — 43246 EGD PLACE GASTROSTOMY TUBE: CPT | Performed by: SURGERY

## 2024-10-31 PROCEDURE — 7100000001 HC PACU RECOVERY - ADDTL 15 MIN: Performed by: SURGERY

## 2024-10-31 PROCEDURE — 99233 SBSQ HOSP IP/OBS HIGH 50: CPT | Performed by: STUDENT IN AN ORGANIZED HEALTH CARE EDUCATION/TRAINING PROGRAM

## 2024-10-31 PROCEDURE — 6370000000 HC RX 637 (ALT 250 FOR IP): Performed by: SURGERY

## 2024-10-31 PROCEDURE — 7100000000 HC PACU RECOVERY - FIRST 15 MIN: Performed by: SURGERY

## 2024-10-31 PROCEDURE — 2580000003 HC RX 258: Performed by: SURGERY

## 2024-10-31 PROCEDURE — 3700000001 HC ADD 15 MINUTES (ANESTHESIA): Performed by: SURGERY

## 2024-10-31 PROCEDURE — 86850 RBC ANTIBODY SCREEN: CPT

## 2024-10-31 PROCEDURE — 2060000000 HC ICU INTERMEDIATE R&B

## 2024-10-31 PROCEDURE — 97166 OT EVAL MOD COMPLEX 45 MIN: CPT

## 2024-10-31 PROCEDURE — 6370000000 HC RX 637 (ALT 250 FOR IP): Performed by: INTERNAL MEDICINE

## 2024-10-31 PROCEDURE — 85027 COMPLETE CBC AUTOMATED: CPT

## 2024-10-31 PROCEDURE — 97161 PT EVAL LOW COMPLEX 20 MIN: CPT

## 2024-10-31 PROCEDURE — 3700000000 HC ANESTHESIA ATTENDED CARE: Performed by: SURGERY

## 2024-10-31 PROCEDURE — 82962 GLUCOSE BLOOD TEST: CPT

## 2024-10-31 PROCEDURE — 6370000000 HC RX 637 (ALT 250 FOR IP): Performed by: STUDENT IN AN ORGANIZED HEALTH CARE EDUCATION/TRAINING PROGRAM

## 2024-10-31 PROCEDURE — 36415 COLL VENOUS BLD VENIPUNCTURE: CPT

## 2024-10-31 RX ORDER — PROPOFOL 10 MG/ML
INJECTION, EMULSION INTRAVENOUS
Status: DISCONTINUED | OUTPATIENT
Start: 2024-10-31 | End: 2024-10-31 | Stop reason: SDUPTHER

## 2024-10-31 RX ORDER — CEFAZOLIN SODIUM 1 G/3ML
INJECTION, POWDER, FOR SOLUTION INTRAMUSCULAR; INTRAVENOUS
Status: DISCONTINUED | OUTPATIENT
Start: 2024-10-31 | End: 2024-10-31 | Stop reason: SDUPTHER

## 2024-10-31 RX ORDER — FENTANYL CITRATE 50 UG/ML
INJECTION, SOLUTION INTRAMUSCULAR; INTRAVENOUS
Status: DISCONTINUED | OUTPATIENT
Start: 2024-10-31 | End: 2024-10-31 | Stop reason: SDUPTHER

## 2024-10-31 RX ORDER — SODIUM CHLORIDE 9 MG/ML
INJECTION, SOLUTION INTRAVENOUS
Status: DISCONTINUED | OUTPATIENT
Start: 2024-10-31 | End: 2024-10-31 | Stop reason: SDUPTHER

## 2024-10-31 RX ADMIN — RIFAXIMIN 400 MG: 200 TABLET ORAL at 13:08

## 2024-10-31 RX ADMIN — OXYCODONE HYDROCHLORIDE 10 MG: 5 TABLET ORAL at 05:35

## 2024-10-31 RX ADMIN — ALLOPURINOL 100 MG: 100 TABLET ORAL at 10:26

## 2024-10-31 RX ADMIN — OXYCODONE HYDROCHLORIDE 10 MG: 5 TABLET ORAL at 20:43

## 2024-10-31 RX ADMIN — OXYCODONE HYDROCHLORIDE 10 MG: 5 TABLET ORAL at 15:16

## 2024-10-31 RX ADMIN — HYDROXYZINE PAMOATE 25 MG: 25 CAPSULE ORAL at 13:10

## 2024-10-31 RX ADMIN — ALLOPURINOL 100 MG: 100 TABLET ORAL at 20:44

## 2024-10-31 RX ADMIN — RIFAXIMIN 400 MG: 200 TABLET ORAL at 20:45

## 2024-10-31 RX ADMIN — LEVETIRACETAM 500 MG: 500 TABLET, FILM COATED ORAL at 20:44

## 2024-10-31 RX ADMIN — Medication 50 MG: at 10:27

## 2024-10-31 RX ADMIN — LACTULOSE 20 G: 20 SOLUTION ORAL at 13:08

## 2024-10-31 RX ADMIN — HYDROXYZINE PAMOATE 25 MG: 25 CAPSULE ORAL at 20:44

## 2024-10-31 RX ADMIN — CEFAZOLIN 2 G: 1 INJECTION, POWDER, FOR SOLUTION INTRAMUSCULAR; INTRAVENOUS at 08:19

## 2024-10-31 RX ADMIN — SODIUM CHLORIDE, PRESERVATIVE FREE 10 ML: 5 INJECTION INTRAVENOUS at 21:22

## 2024-10-31 RX ADMIN — PROPOFOL 100 MG: 10 INJECTION, EMULSION INTRAVENOUS at 08:18

## 2024-10-31 RX ADMIN — SPIRONOLACTONE 100 MG: 25 TABLET ORAL at 10:27

## 2024-10-31 RX ADMIN — LEVETIRACETAM 500 MG: 500 TABLET, FILM COATED ORAL at 10:27

## 2024-10-31 RX ADMIN — SODIUM CHLORIDE, PRESERVATIVE FREE 10 ML: 5 INJECTION INTRAVENOUS at 10:49

## 2024-10-31 RX ADMIN — FENTANYL CITRATE 100 MCG: 50 INJECTION, SOLUTION INTRAMUSCULAR; INTRAVENOUS at 08:18

## 2024-10-31 RX ADMIN — LACTULOSE 20 G: 20 SOLUTION ORAL at 10:27

## 2024-10-31 RX ADMIN — SODIUM CHLORIDE: 9 INJECTION, SOLUTION INTRAVENOUS at 08:15

## 2024-10-31 RX ADMIN — HYDROXYZINE PAMOATE 25 MG: 25 CAPSULE ORAL at 06:56

## 2024-10-31 RX ADMIN — THIAMINE HYDROCHLORIDE 100 MG: 100 INJECTION, SOLUTION INTRAMUSCULAR; INTRAVENOUS at 10:27

## 2024-10-31 RX ADMIN — LACTULOSE 20 G: 20 SOLUTION ORAL at 20:44

## 2024-10-31 RX ADMIN — OXYCODONE HYDROCHLORIDE 10 MG: 5 TABLET ORAL at 10:28

## 2024-10-31 ASSESSMENT — PAIN DESCRIPTION - LOCATION
LOCATION: ABDOMEN
LOCATION: LEG;KNEE
LOCATION: ABDOMEN
LOCATION: KNEE;LEG

## 2024-10-31 ASSESSMENT — PAIN - FUNCTIONAL ASSESSMENT: PAIN_FUNCTIONAL_ASSESSMENT: 0-10

## 2024-10-31 ASSESSMENT — PAIN SCALES - GENERAL
PAINLEVEL_OUTOF10: 6
PAINLEVEL_OUTOF10: 10
PAINLEVEL_OUTOF10: 0
PAINLEVEL_OUTOF10: 0
PAINLEVEL_OUTOF10: 5
PAINLEVEL_OUTOF10: 10
PAINLEVEL_OUTOF10: 7

## 2024-10-31 ASSESSMENT — PAIN DESCRIPTION - DESCRIPTORS
DESCRIPTORS: ACHING;CRAMPING
DESCRIPTORS: ACHING;STABBING
DESCRIPTORS: DISCOMFORT
DESCRIPTORS: DISCOMFORT

## 2024-10-31 ASSESSMENT — PAIN DESCRIPTION - ORIENTATION
ORIENTATION: OTHER (COMMENT)
ORIENTATION: OTHER (COMMENT)
ORIENTATION: MID
ORIENTATION: RIGHT;LEFT

## 2024-10-31 NOTE — ANESTHESIA PRE PROCEDURE
intracranial abnormality TECHNOLOGIST PROVIDED HISTORY: Has a \"code stroke\" or \"stroke alert\" been called?->No Reason for exam:->Evaluate intracranial abnormality Decision Support Exception - unselect if not a suspected or confirmed emergency medical condition->Emergency Medical Condition (MA) What reading provider will be dictating this exam?->CRC FINDINGS: BRAIN/VENTRICLES: There is no acute intracranial hemorrhage, mass effect or midline shift.  No abnormal extra-axial fluid collection.  The gray-white differentiation is maintained without evidence of an acute infarct.  There is no evidence of hydrocephalus.   Periventricular white matter changes consistent chronic microvascular disease. ORBITS: The visualized portion of the orbits demonstrate no acute abnormality. SINUSES: The visualized paranasal sinuses and mastoid air cells demonstrate no acu left maxillary sinus mucosal thickening. SOFT TISSUES/SKULL:  No acute abnormality of the visualized skull or soft tissues.     No acute intracranial abnormality.     XR CHEST PORTABLE    Result Date: 10/29/2024  EXAMINATION: ONE XRAY VIEW OF THE CHEST 10/29/2024 10:14 pm COMPARISON: None. HISTORY: ORDERING SYSTEM PROVIDED HISTORY: ams TECHNOLOGIST PROVIDED HISTORY: Reason for exam:->ams FINDINGS: The lungs are without acute focal process.  Right pleural effusion.  No pneumothorax.  The cardiomediastinal silhouette is without acute process. The osseous structures are without acute process.  Right-sided PICC line tip at the caval atrial junction.  Elevation right hemidiaphragm.     Right pleural effusion.     MRI CERVICAL SPINE W WO CONTRAST    Result Date: 10/16/2024  EXAMINATION: MRI OF THE CERVICAL SPINE WITHOUT AND WITH CONTRAST  10/16/2024 2:50 pm: TECHNIQUE: Multiplanar multisequence MRI of the cervical spine was performed without and with the administration of intravenous contrast. COMPARISON: None. HISTORY: ORDERING SYSTEM PROVIDED HISTORY: edema in cervical

## 2024-10-31 NOTE — OP NOTE
[facility]  UPPER ENDOSCOPY REPORT      DATE OF PROCEDURE: 10/31/2024     PREOPERATIVE DIAGNOSIS: altered mental status, malnutrition    POSTOPERATIVE DIAGNOSIS/FINDINGS: same and severe gastritis    SURGEON: Rosario Turk MD, MSc, FACS    ASSISTANT: None    OPERATION: 1. Esophagogastroduodenoscopy 2. Percutaneous Endoscopic Gastrostomy (PEG)  placement    ANESTHESIA: Local monitored anesthesia.     COMPLICATIONS: None.     ESTIMATED BLOOD LOSS: 5 ml     SPECIMENS: none    PRIOR TO EXAM: Gen: comfortable, no distress, awake and alert; Lungs: Clear;  Heart:regular rate and rhythm, normal S1S2     BRIEF HISTORY:  This is a 59 y.o. female who presents with the complaint of malnutrition and AMS. My recommendation is to proceed with esophagogastroduodenoscopy/ PEG tube placement. The patient was advised of the risks, benefits, complications and options including the risk of bleeding and perforation. The patient understood and agreed to proceed.    Under LMAC anesthesia, the patient was positioned in the left side down decubitus position. A bite block was inserted. Under direct visualization the scope was passed through the oral cavity, into the esophagus and then into the stomach. The scope was then passed through a normal appearing pylorus into the duodenum. The proximal duodenum and bulb were unremarkable. The scope was pulled back into the stomach and retroflexed. Visualization on the gastroesophageal junction and fundus was unremarkable. The scope was then straightened and the distal stomach was inspected. This showed no evidence of gastritis or ulcer. No biopsies were performed. A good one to one was obtained with the scope in the distal stomach    The area of the abdomen was prepped with iodine. 10 cc1% lidocaine was used for local anesthetic for the skin and subcutaneous tissue.  The 11 blade was used to make a skin incision. The 18 gauge angio cath was used to stick the skin into

## 2024-10-31 NOTE — ANESTHESIA POSTPROCEDURE EVALUATION
Department of Anesthesiology  Postprocedure Note    Patient: Lissa Diggs  MRN: [id number]  YOB: 1965  Date of evaluation: 10/31/2024    Procedure Summary       Date: 10/31/24 Room / Location: [place] [address] / [facility]    Anesthesia Start: 0815 Anesthesia Stop:     Procedure: ESOPHAGOGASTRODUODENOSCOPY PERCUTANEOUS ENDOSCOPIC GASTROSTOMY TUBE INSERTION Diagnosis:       Malnutrition, unspecified type (HCC)      (Malnutrition, unspecified type (HCC) [E46])    Surgeons: Rosario Turk MD Responsible Provider: Richardson Lopez MD    Anesthesia Type: Not recorded ASA Status: Not recorded            Anesthesia Type: MAC    Armando Phase I: Armando Score: 10    Armando Phase II:      Anesthesia Post Evaluation    Patient location during evaluation: PACU  Patient participation: complete - patient participated  Level of consciousness: awake  Pain score: 3  Airway patency: patent  Nausea & Vomiting: no nausea and no vomiting  Cardiovascular status: blood pressure returned to baseline  Respiratory status: acceptable  Hydration status: euvolemic      No notable events documented.

## 2024-11-01 LAB
ACB COMPLEX DNA BLD POS QL NAA+NON-PROBE: NOT DETECTED
ANION GAP SERPL CALCULATED.3IONS-SCNC: 4 MMOL/L (ref 7–16)
B FRAGILIS DNA BLD POS QL NAA+NON-PROBE: NOT DETECTED
BASOPHILS # BLD: 0 K/UL (ref 0–0.2)
BASOPHILS NFR BLD: 0 % (ref 0–2)
BIOFIRE TEST COMMENT: ABNORMAL
BUN SERPL-MCNC: 25 MG/DL (ref 6–23)
C ALBICANS DNA BLD POS QL NAA+NON-PROBE: NOT DETECTED
C AURIS DNA BLD POS QL NAA+NON-PROBE: NOT DETECTED
C GATTII+NEOFOR DNA BLD POS QL NAA+N-PRB: NOT DETECTED
C GLABRATA DNA BLD POS QL NAA+NON-PROBE: NOT DETECTED
C KRUSEI DNA BLD POS QL NAA+NON-PROBE: NOT DETECTED
C PARAP DNA BLD POS QL NAA+NON-PROBE: NOT DETECTED
C TROPICLS DNA BLD POS QL NAA+NON-PROBE: NOT DETECTED
CALCIUM SERPL-MCNC: 10.7 MG/DL (ref 8.6–10.2)
CHLORIDE SERPL-SCNC: 112 MMOL/L (ref 98–107)
CO2 SERPL-SCNC: 31 MMOL/L (ref 22–29)
CREAT SERPL-MCNC: 1.3 MG/DL (ref 0.5–1)
E CLOAC COMP DNA BLD POS NAA+NON-PROBE: NOT DETECTED
E COLI DNA BLD POS QL NAA+NON-PROBE: NOT DETECTED
E FAECALIS DNA BLD POS QL NAA+NON-PROBE: NOT DETECTED
E FAECIUM DNA BLD POS QL NAA+NON-PROBE: NOT DETECTED
ENTEROBACTERALES DNA BLD POS NAA+N-PRB: NOT DETECTED
EOSINOPHIL # BLD: 0.19 K/UL (ref 0.05–0.5)
EOSINOPHILS RELATIVE PERCENT: 3 % (ref 0–6)
ERYTHROCYTE [DISTWIDTH] IN BLOOD BY AUTOMATED COUNT: 18.5 % (ref 11.5–15)
GFR, ESTIMATED: 48 ML/MIN/1.73M2
GLUCOSE BLD-MCNC: 178 MG/DL (ref 74–99)
GLUCOSE BLD-MCNC: 236 MG/DL (ref 74–99)
GLUCOSE BLD-MCNC: 296 MG/DL (ref 74–99)
GLUCOSE SERPL-MCNC: 235 MG/DL (ref 74–99)
GP B STREP DNA BLD POS QL NAA+NON-PROBE: NOT DETECTED
HAEM INFLU DNA BLD POS QL NAA+NON-PROBE: NOT DETECTED
HCT VFR BLD AUTO: 31 % (ref 34–48)
HGB BLD-MCNC: 9.4 G/DL (ref 11.5–15.5)
K OXYTOCA DNA BLD POS QL NAA+NON-PROBE: NOT DETECTED
KLEBSIELLA SP DNA BLD POS QL NAA+NON-PRB: NOT DETECTED
KLEBSIELLA SP DNA BLD POS QL NAA+NON-PRB: NOT DETECTED
L MONOCYTOG DNA BLD POS QL NAA+NON-PROBE: NOT DETECTED
LYMPHOCYTES NFR BLD: 0.31 K/UL (ref 1.5–4)
LYMPHOCYTES RELATIVE PERCENT: 4 % (ref 20–42)
MCH RBC QN AUTO: 33.7 PG (ref 26–35)
MCHC RBC AUTO-ENTMCNC: 30.3 G/DL (ref 32–34.5)
MCV RBC AUTO: 111.1 FL (ref 80–99.9)
MICROORGANISM SPEC CULT: ABNORMAL
MICROORGANISM/AGENT SPEC: ABNORMAL
MONOCYTES NFR BLD: 0.19 K/UL (ref 0.1–0.95)
MONOCYTES NFR BLD: 3 % (ref 2–12)
N MEN DNA BLD POS QL NAA+NON-PROBE: NOT DETECTED
NEUTROPHILS NFR BLD: 90 % (ref 43–80)
NEUTS SEG NFR BLD: 6.31 K/UL (ref 1.8–7.3)
P AERUGINOSA DNA BLD POS NAA+NON-PROBE: NOT DETECTED
PLATELET # BLD AUTO: 65 K/UL (ref 130–450)
PLATELET CONFIRMATION: NORMAL
PMV BLD AUTO: 10.3 FL (ref 7–12)
POTASSIUM SERPL-SCNC: 4.2 MMOL/L (ref 3.5–5)
PROTEUS SP DNA BLD POS QL NAA+NON-PROBE: NOT DETECTED
RBC # BLD AUTO: 2.79 M/UL (ref 3.5–5.5)
RBC # BLD: ABNORMAL 10*6/UL
S AUREUS DNA BLD POS QL NAA+NON-PROBE: NOT DETECTED
S AUREUS+CONS DNA BLD POS NAA+NON-PROBE: DETECTED
S EPIDERMIDIS DNA BLD POS QL NAA+NON-PRB: NOT DETECTED
S LUGDUNENSIS DNA BLD POS QL NAA+NON-PRB: NOT DETECTED
S MALTOPHILIA DNA BLD POS QL NAA+NON-PRB: NOT DETECTED
S MARCESCENS DNA BLD POS NAA+NON-PROBE: NOT DETECTED
S PNEUM DNA BLD POS QL NAA+NON-PROBE: NOT DETECTED
S PYO DNA BLD POS QL NAA+NON-PROBE: NOT DETECTED
SALMONELLA DNA BLD POS QL NAA+NON-PROBE: NOT DETECTED
SERVICE CMNT-IMP: ABNORMAL
SODIUM SERPL-SCNC: 147 MMOL/L (ref 132–146)
SPECIMEN DESCRIPTION: ABNORMAL
STREPTOCOCCUS DNA BLD POS NAA+NON-PROBE: NOT DETECTED
WBC # BLD: ABNORMAL 10*3/UL
WBC OTHER # BLD: 7 K/UL (ref 4.5–11.5)

## 2024-11-01 PROCEDURE — 6360000002 HC RX W HCPCS: Performed by: STUDENT IN AN ORGANIZED HEALTH CARE EDUCATION/TRAINING PROGRAM

## 2024-11-01 PROCEDURE — 99233 SBSQ HOSP IP/OBS HIGH 50: CPT | Performed by: STUDENT IN AN ORGANIZED HEALTH CARE EDUCATION/TRAINING PROGRAM

## 2024-11-01 PROCEDURE — 2700000000 HC OXYGEN THERAPY PER DAY

## 2024-11-01 PROCEDURE — 6370000000 HC RX 637 (ALT 250 FOR IP): Performed by: SURGERY

## 2024-11-01 PROCEDURE — 82962 GLUCOSE BLOOD TEST: CPT

## 2024-11-01 PROCEDURE — 6370000000 HC RX 637 (ALT 250 FOR IP): Performed by: STUDENT IN AN ORGANIZED HEALTH CARE EDUCATION/TRAINING PROGRAM

## 2024-11-01 PROCEDURE — 80048 BASIC METABOLIC PNL TOTAL CA: CPT

## 2024-11-01 PROCEDURE — 2500000003 HC RX 250 WO HCPCS: Performed by: STUDENT IN AN ORGANIZED HEALTH CARE EDUCATION/TRAINING PROGRAM

## 2024-11-01 PROCEDURE — 6360000002 HC RX W HCPCS: Performed by: SURGERY

## 2024-11-01 PROCEDURE — 6360000002 HC RX W HCPCS: Performed by: NURSE PRACTITIONER

## 2024-11-01 PROCEDURE — 99222 1ST HOSP IP/OBS MODERATE 55: CPT | Performed by: NURSE PRACTITIONER

## 2024-11-01 PROCEDURE — 2580000003 HC RX 258: Performed by: STUDENT IN AN ORGANIZED HEALTH CARE EDUCATION/TRAINING PROGRAM

## 2024-11-01 PROCEDURE — 2060000000 HC ICU INTERMEDIATE R&B

## 2024-11-01 PROCEDURE — 2580000003 HC RX 258: Performed by: SURGERY

## 2024-11-01 PROCEDURE — 85025 COMPLETE CBC W/AUTO DIFF WBC: CPT

## 2024-11-01 RX ORDER — MORPHINE SULFATE 4 MG/ML
4 INJECTION, SOLUTION INTRAMUSCULAR; INTRAVENOUS
Status: DISCONTINUED | OUTPATIENT
Start: 2024-11-01 | End: 2024-11-03

## 2024-11-01 RX ORDER — MORPHINE SULFATE 2 MG/ML
2 INJECTION, SOLUTION INTRAMUSCULAR; INTRAVENOUS
Status: DISCONTINUED | OUTPATIENT
Start: 2024-11-01 | End: 2024-11-01

## 2024-11-01 RX ORDER — LORAZEPAM 2 MG/ML
0.5 INJECTION INTRAMUSCULAR EVERY 6 HOURS PRN
Status: DISCONTINUED | OUTPATIENT
Start: 2024-11-01 | End: 2024-11-04

## 2024-11-01 RX ORDER — PANTOPRAZOLE SODIUM 40 MG/1
40 TABLET, DELAYED RELEASE ORAL
Status: DISCONTINUED | OUTPATIENT
Start: 2024-11-01 | End: 2024-11-04 | Stop reason: HOSPADM

## 2024-11-01 RX ADMIN — MORPHINE SULFATE 4 MG: 4 INJECTION, SOLUTION INTRAMUSCULAR; INTRAVENOUS at 19:41

## 2024-11-01 RX ADMIN — RIFAXIMIN 400 MG: 200 TABLET ORAL at 22:19

## 2024-11-01 RX ADMIN — OXYCODONE HYDROCHLORIDE 10 MG: 5 TABLET ORAL at 08:36

## 2024-11-01 RX ADMIN — PETROLATUM: 420 OINTMENT TOPICAL at 22:21

## 2024-11-01 RX ADMIN — SPIRONOLACTONE 100 MG: 25 TABLET ORAL at 08:37

## 2024-11-01 RX ADMIN — LEVETIRACETAM 500 MG: 500 TABLET, FILM COATED ORAL at 22:19

## 2024-11-01 RX ADMIN — OXYCODONE HYDROCHLORIDE 10 MG: 5 TABLET ORAL at 12:58

## 2024-11-01 RX ADMIN — SODIUM CHLORIDE, PRESERVATIVE FREE 10 ML: 5 INJECTION INTRAVENOUS at 22:21

## 2024-11-01 RX ADMIN — ONDANSETRON 4 MG: 4 TABLET, ORALLY DISINTEGRATING ORAL at 08:36

## 2024-11-01 RX ADMIN — INSULIN LISPRO 1 UNITS: 100 INJECTION, SOLUTION INTRAVENOUS; SUBCUTANEOUS at 17:07

## 2024-11-01 RX ADMIN — LEVETIRACETAM 500 MG: 500 TABLET, FILM COATED ORAL at 08:36

## 2024-11-01 RX ADMIN — INSULIN LISPRO 2 UNITS: 100 INJECTION, SOLUTION INTRAVENOUS; SUBCUTANEOUS at 22:20

## 2024-11-01 RX ADMIN — MORPHINE SULFATE 2 MG: 2 INJECTION, SOLUTION INTRAMUSCULAR; INTRAVENOUS at 17:07

## 2024-11-01 RX ADMIN — THIAMINE HYDROCHLORIDE 100 MG: 100 INJECTION, SOLUTION INTRAMUSCULAR; INTRAVENOUS at 08:36

## 2024-11-01 RX ADMIN — MICONAZOLE NITRATE: 20.6 POWDER TOPICAL at 22:18

## 2024-11-01 RX ADMIN — RIFAXIMIN 400 MG: 200 TABLET ORAL at 08:37

## 2024-11-01 RX ADMIN — Medication 50 MG: at 08:36

## 2024-11-01 RX ADMIN — WATER 1000 MG: 1 INJECTION INTRAMUSCULAR; INTRAVENOUS; SUBCUTANEOUS at 12:57

## 2024-11-01 RX ADMIN — HYDROXYZINE PAMOATE 25 MG: 25 CAPSULE ORAL at 08:36

## 2024-11-01 RX ADMIN — LORAZEPAM 0.5 MG: 2 INJECTION INTRAMUSCULAR; INTRAVENOUS at 14:23

## 2024-11-01 RX ADMIN — PANTOPRAZOLE SODIUM 40 MG: 40 TABLET, DELAYED RELEASE ORAL at 17:08

## 2024-11-01 RX ADMIN — ALLOPURINOL 100 MG: 100 TABLET ORAL at 22:19

## 2024-11-01 RX ADMIN — ENOXAPARIN SODIUM 30 MG: 100 INJECTION SUBCUTANEOUS at 22:17

## 2024-11-01 RX ADMIN — SODIUM CHLORIDE, PRESERVATIVE FREE 10 ML: 5 INJECTION INTRAVENOUS at 08:37

## 2024-11-01 RX ADMIN — RIFAXIMIN 400 MG: 200 TABLET ORAL at 14:28

## 2024-11-01 RX ADMIN — MORPHINE SULFATE 2 MG: 2 INJECTION, SOLUTION INTRAMUSCULAR; INTRAVENOUS at 13:54

## 2024-11-01 RX ADMIN — ALLOPURINOL 100 MG: 100 TABLET ORAL at 08:36

## 2024-11-01 ASSESSMENT — PAIN SCALES - GENERAL
PAINLEVEL_OUTOF10: 6
PAINLEVEL_OUTOF10: 7

## 2024-11-01 ASSESSMENT — PAIN DESCRIPTION - DESCRIPTORS
DESCRIPTORS: ACHING;DISCOMFORT
DESCRIPTORS: ACHING;DISCOMFORT;SORE

## 2024-11-01 ASSESSMENT — PAIN DESCRIPTION - LOCATION
LOCATION: GENERALIZED

## 2024-11-01 ASSESSMENT — PAIN SCALES - WONG BAKER: WONGBAKER_NUMERICALRESPONSE: NO HURT

## 2024-11-01 NOTE — DISCHARGE INSTR - COC
patient):  {CHP DME Belongings:293003583}    RN SIGNATURE:  {Esignature:[id number]}    CASE MANAGEMENT/SOCIAL WORK SECTION    Inpatient Status Date: 10/30/24     Readmission Risk Assessment Score:  Readmission Risk              Risk of Unplanned Readmission:  21           Discharging [facility] Facility/ Agency   [facility]  Address:[address] [place] [address]  Phone:[phone]  Fax:[phone]        / signature: Electronically signed by Radha Casanova RN on 11/1/24 at 3:01 PM EDT    PHYSICIAN SECTION    Prognosis: {Prognosis:5295243021}    Condition at Discharge: { Patient Condition:900732084}    Rehab Potential (if transferring to Rehab): {Prognosis:9565395743}    Recommended Labs or Other Treatments After Discharge: ***    Physician Certification: I certify the above information and transfer of Lissa Diggs  is necessary for the continuing treatment of the diagnosis listed and that she requires {Admit to Appropriate Level of Care:35940} for {GREATER/LESS:139737803} 30 days.     Update Admission H&P: {CHP DME Changes in HandP:310537103}    PHYSICIAN SIGNATURE:  {Esignature:[id number]}

## 2024-11-01 NOTE — CARE COORDINATION
11/1/24 CM Note. Pt admitted 11/30/24 for AMS. IV Rocephin.  Pt from [facility]. Discharge plan to return.  Precert started. PASRR requested and completed.  Pt active with [facility] at the facility and requesting discussion for possible Hospice once return to facility.  Family and facility in agreement with plan. Destination/RAUL updated. Ambulance form/envelope on chart.  Pt made \"will call\" with [facility] ambulance services, [phone]  Radha SUAREZN RN-BC  [phone]

## 2024-11-01 NOTE — CONSULTS
Palliative Care Department  [phone]  Palliative Care Initial Consult  Provider CHIQUIS Alcantara CNP      PATIENT: Lissa Diggs  : 1965  MRN: [id number]  ADMISSION DATE: 10/29/2024  9:03 PM  Referring Provider: Lilo Jones MD     Palliative Medicine was consulted on hospital day 3 for assistance with Goals of care    HPI:     Clinical Summary:Lissa Diggs is a 59 y.o. y/o female with a history of iver cirrhosis, diabetes, morbid obesity, bedbound, recurrent skin abscesses, history of MRSA MSSA and enterococci who presented to [facility] on 10/29/2024 with altered mental status and decreased oral intake.  Labs were significant for a lactic acid of 2.5, albumin 1.9, WBCs 4.7.  She was admitted to telemetry and a PEG tube was placed.  She remains admitted to telemetry for further medical management.    ASSESSMENT/PLAN:     Pertinent Hospital Diagnoses     Altered mental status  MOYA cirrhosis  Bedbound  Chronic wounds  Severe protein calorie malnutrition    Palliative Care Encounter / Counseling Regarding Goals of Care  Please see detailed goals of care discussion as below  At this time, Lissa Diggs, Does Not have capacity for medical decision-making.  Capacity is time limited and situation/question specific  During encounter Olimpia was surrogate medical decision-maker  Outcome of goals of care meeting:  CODE STATUS changed to a DNR CC  Hospice consult placed  Medications adjusted for comfort and agitation  Code status DNR-CC  Advanced Directives: no POA or living will in epic  Surrogate/Legal NOK:  olimpia diggs () [phone]     Spiritual assessment: no spiritual distress identified  Bereavement and grief: to be determined  Referrals to: none today    Thank you for the opportunity to participate in the care of Lissa Diggs.     CHIQUIS Alcantara CNP   Palliative Medicine     SUBJECTIVE:     Details of Conversation: Chart reviewed and met with the patient and her 
Comprehensive Nutrition Assessment    Type and Reason for Visit:  Initial, Consult (TF order/management)    Nutrition Recommendations/Plan:   Continue current diet  Modify tube feeding d/t hyperglycemia w/ hx DM    Diabetic @ 60 ml/hr to provide 1440 ml tv, 2160 kcals, 119 gm pro, 1093 ml free water   Defer water flushes per physician management 2/2 hypernatremia    Pt is at risk for refeeding syndrome. Monitor electrolytes closely/replace prn        Malnutrition Assessment:  Malnutrition Status:  Severe malnutrition (10/31/24 9659)    Context:  Chronic Illness     Findings of the 6 clinical characteristics of malnutrition:  Energy Intake:  75% or less estimated energy requirements for 1 month or longer  Weight Loss:  Unable to assess (d/t fluid shifts w/ cirrhosis)     Body Fat Loss:  Severe body fat loss Orbital, Triceps, Buccal region   Muscle Mass Loss:  Severe muscle mass loss Temples (temporalis), Clavicles (pectoralis & deltoids)  Fluid Accumulation:  No fluid accumulation     Strength:  Not Performed    Nutrition Assessment:    Pt admit from [facility]CF w/ AMS & evaluation for PEG tube. Noted poor PO intake x past 5 months per EMR. Pt meets criteria for severe malnutrition. PMHx DM, MOYA cirrhosis, bedbound status, recurrent skin abscesses, dysphagia. Pt recently admitted 2 weeks ago d/t AMS/seizures, UTI & wounds. Pt now s/p PEG placement 10/31 w/ TF initiated. Will provide updated TF recs and continue to monitor.    Nutrition Related Findings:    A&Ox1, PEG w/ TF, abd soft, no edema, fluids WDL, hyperglycemia, hypernatremia Wound Type: Multiple, Open Wounds, Wound Consult Pending       Current Nutrition Intake & Therapies:    Average Meal Intake: 0%     ADULT TUBE FEEDING; PEG; Standard with Fiber; Continuous; 10; Yes; 10; Q 4 hours; 20; 30; Q 4 hours  ADULT DIET; Dysphagia - Soft and Bite Sized; Moderately Thick (Honey)  Current Tube Feeding (TF) Orders:  Feeding Route: PEG  Formula: Standard with 
General Surgery   Consult Note      Patient's Name/Date of Birth: Lissa Diggs / 1965    Date: 2024     PCP: Rene Meneses MD     Chief Complaint: ***    HPI:   Lissa Diggs is a 59 y.o. female who presents for evaluation of ***. Timing is ***, radiation to ***, alleviated by *** and started *** and severity is ***/10      Patient Active Problem List   Diagnosis    Acute delirium    Seizure-like activity (HCC)    Palliative care by specialist    Goals of care, counseling/discussion    UTI due to extended-spectrum beta lactamase (ESBL) producing Escherichia coli    Palliative care encounter    Acute metabolic encephalopathy    Cervical stenosis of spine    AMS (altered mental status)       No Known Allergies    Past Medical History:   Diagnosis Date    A-fib (HCC)     DM (diabetes mellitus) (HCC)     Falls     HF (heart failure) (HCC)     MRSA (methicillin resistant Staphylococcus aureus)     MOYA (nonalcoholic steatohepatitis)     MAY (obstructive sleep apnea)     Thrombocytopenia (HCC)     Urinary retention        Past Surgical History:   Procedure Laterality Date     SECTION      CYST REMOVAL      throat       Social History     Socioeconomic History    Marital status:      Spouse name: Not on file    Number of children: Not on file    Years of education: Not on file    Highest education level: Not on file   Occupational History    Not on file   Tobacco Use    Smoking status: Never    Smokeless tobacco: Not on file   Substance and Sexual Activity    Alcohol use: Never    Drug use: Never    Sexual activity: Never   Other Topics Concern    Not on file   Social History Narrative    Not on file     Social Determinants of Health     Financial Resource Strain: Not on file   Food Insecurity: No Food Insecurity (10/4/2024)    Hunger Vital Sign     Worried About Running Out of Food in the Last Year: Never true     Ran Out of Food in the Last Year: Never true   Transportation Needs: No 
TECHNOLOGIST PROVIDED HISTORY: Has a \"code stroke\" or \"stroke alert\" been called?->No Reason for exam:->Evaluate intracranial abnormality Decision Support Exception - unselect if not a suspected or confirmed emergency medical condition->Emergency Medical Condition (MA) What reading provider will be dictating this exam?->CRC FINDINGS: BRAIN/VENTRICLES: There is no acute intracranial hemorrhage, mass effect or midline shift.  No abnormal extra-axial fluid collection.  The gray-white differentiation is maintained without evidence of an acute infarct.  There is no evidence of hydrocephalus.   Periventricular white matter changes consistent chronic microvascular disease. ORBITS: The visualized portion of the orbits demonstrate no acute abnormality. SINUSES: The visualized paranasal sinuses and mastoid air cells demonstrate no acu left maxillary sinus mucosal thickening. SOFT TISSUES/SKULL:  No acute abnormality of the visualized skull or soft tissues.     No acute intracranial abnormality.     XR CHEST PORTABLE    Result Date: 10/29/2024  EXAMINATION: ONE XRAY VIEW OF THE CHEST 10/29/2024 10:14 pm COMPARISON: None. HISTORY: ORDERING SYSTEM PROVIDED HISTORY: ams TECHNOLOGIST PROVIDED HISTORY: Reason for exam:->ams FINDINGS: The lungs are without acute focal process.  Right pleural effusion.  No pneumothorax.  The cardiomediastinal silhouette is without acute process. The osseous structures are without acute process.  Right-sided PICC line tip at the caval atrial junction.  Elevation right hemidiaphragm.     Right pleural effusion.         ASSESSMENT:  59 y.o. female with worsening AMS and malnutrition who is valuated for PEG placement     PLAN:  - Will order CTAP to reevaluate for ascites.   - Plan for PEG is pending CTAP findings.       Discussed with Dr. Turk.     Jo Shipley  General Surgery PGY-1  10/30/24 at 9:26 AM EDT

## 2024-11-01 NOTE — FLOWSHEET NOTE
Inpatient Wound Care (Initial consult) 7408    Admit Date: 10/29/2024  9:03 PM    Reason for consult:  Wounds    Significant history: Per H&P     History of Present Illness:    Patient is a 65-year-old female with significant medical history hepatic cirrhosis, T2DM, morbid obesity, recurrent skin abscesses, history of MRSA MSSA and enterococci, chronically bedbound who presents from a nursing home after  noted patient became more altered with decreased oral intake.  The ED, labs were significant for lactic acid 2.5, albumin 1.9, Trope 88>88, WBC 4.7, Hgb 9.0, .  Patient is being admitted for further evaluation and possible PEG placement.     Findings:     11/01/24 1601   Skin Integumentary    Skin Integrity Excoriation   Location abdominal fold   Skin Integrity Site 2   Skin Integrity Location 2   (dry flaky)   Location 2 bilateral feet   Skin Integrity Site 3   Skin Integrity Location 3 Erosion/denuded    Location 3 bilateral buttocks   Wound 10/30/24 Pretibial Left   Date First Assessed/Time First Assessed: 10/30/24 2200   Location: Pretibial  Wound Location Orientation: Left   Wound Image    Wound Etiology Other  (partial thickness)   Dressing Status New dressing applied   Wound Cleansed Cleansed with saline   Dressing/Treatment Alginate;Dry dressing   Wound Length (cm) 2 cm   Wound Width (cm) 2 cm   Wound Depth (cm) 0.1 cm   Wound Surface Area (cm^2) 4 cm^2   Wound Volume (cm^3) 0.4 cm^3   Wound Assessment Pink/red   Drainage Amount Scant (moist but unmeasurable)   Drainage Description Serosanguinous   Odor Fecal   Becky-wound Assessment Dry/flaky   Wound 10/04/24 Hip Right   Date First Assessed/Time First Assessed: 10/04/24 2030   Present on Original Admission: Yes  Primary Wound Type: Pressure Injury  Location: Hip  Wound Location Orientation: Right   Wound Image    Wound Etiology Pressure Stage 3   Dressing Status New dressing applied   Wound Cleansed Cleansed with saline   Dressing/Treatment

## 2024-11-02 LAB
ALBUMIN SERPL-MCNC: 1.7 G/DL (ref 3.5–5.2)
ALP SERPL-CCNC: 252 U/L (ref 35–104)
ALT SERPL-CCNC: 12 U/L (ref 0–32)
ANION GAP SERPL CALCULATED.3IONS-SCNC: 1 MMOL/L (ref 7–16)
AST SERPL-CCNC: 41 U/L (ref 0–31)
BILIRUB DIRECT SERPL-MCNC: 0.5 MG/DL (ref 0–0.3)
BILIRUB INDIRECT SERPL-MCNC: 0.7 MG/DL (ref 0–1)
BILIRUB SERPL-MCNC: 1.2 MG/DL (ref 0–1.2)
BUN SERPL-MCNC: 24 MG/DL (ref 6–23)
CALCIUM SERPL-MCNC: 8.6 MG/DL (ref 8.6–10.2)
CHLORIDE SERPL-SCNC: 121 MMOL/L (ref 98–107)
CO2 SERPL-SCNC: 28 MMOL/L (ref 22–29)
CREAT SERPL-MCNC: 1.2 MG/DL (ref 0.5–1)
GFR, ESTIMATED: 50 ML/MIN/1.73M2
GLUCOSE BLD-MCNC: 211 MG/DL (ref 74–99)
GLUCOSE BLD-MCNC: 219 MG/DL (ref 74–99)
GLUCOSE BLD-MCNC: 230 MG/DL (ref 74–99)
GLUCOSE BLD-MCNC: 247 MG/DL (ref 74–99)
GLUCOSE SERPL-MCNC: 180 MG/DL (ref 74–99)
POTASSIUM SERPL-SCNC: 3.7 MMOL/L (ref 3.5–5)
PROT SERPL-MCNC: 5.1 G/DL (ref 6.4–8.3)
SODIUM SERPL-SCNC: 150 MMOL/L (ref 132–146)

## 2024-11-02 PROCEDURE — 2580000003 HC RX 258: Performed by: INTERNAL MEDICINE

## 2024-11-02 PROCEDURE — 82962 GLUCOSE BLOOD TEST: CPT

## 2024-11-02 PROCEDURE — 99233 SBSQ HOSP IP/OBS HIGH 50: CPT | Performed by: INTERNAL MEDICINE

## 2024-11-02 PROCEDURE — 6360000002 HC RX W HCPCS: Performed by: STUDENT IN AN ORGANIZED HEALTH CARE EDUCATION/TRAINING PROGRAM

## 2024-11-02 PROCEDURE — 2580000003 HC RX 258: Performed by: SURGERY

## 2024-11-02 PROCEDURE — 80053 COMPREHEN METABOLIC PANEL: CPT

## 2024-11-02 PROCEDURE — 6370000000 HC RX 637 (ALT 250 FOR IP): Performed by: SURGERY

## 2024-11-02 PROCEDURE — 6370000000 HC RX 637 (ALT 250 FOR IP): Performed by: INTERNAL MEDICINE

## 2024-11-02 PROCEDURE — 99231 SBSQ HOSP IP/OBS SF/LOW 25: CPT | Performed by: NURSE PRACTITIONER

## 2024-11-02 PROCEDURE — 2700000000 HC OXYGEN THERAPY PER DAY

## 2024-11-02 PROCEDURE — 2060000000 HC ICU INTERMEDIATE R&B

## 2024-11-02 PROCEDURE — 2580000003 HC RX 258: Performed by: STUDENT IN AN ORGANIZED HEALTH CARE EDUCATION/TRAINING PROGRAM

## 2024-11-02 PROCEDURE — 82248 BILIRUBIN DIRECT: CPT

## 2024-11-02 PROCEDURE — 6360000002 HC RX W HCPCS: Performed by: SURGERY

## 2024-11-02 PROCEDURE — 6360000002 HC RX W HCPCS: Performed by: NURSE PRACTITIONER

## 2024-11-02 RX ORDER — GLUCAGON 1 MG/ML
1 KIT INJECTION PRN
Status: DISCONTINUED | OUTPATIENT
Start: 2024-11-02 | End: 2024-11-04 | Stop reason: HOSPADM

## 2024-11-02 RX ORDER — DEXTROSE MONOHYDRATE 100 MG/ML
INJECTION, SOLUTION INTRAVENOUS CONTINUOUS PRN
Status: DISCONTINUED | OUTPATIENT
Start: 2024-11-02 | End: 2024-11-04 | Stop reason: HOSPADM

## 2024-11-02 RX ORDER — INSULIN LISPRO 100 [IU]/ML
0-4 INJECTION, SOLUTION INTRAVENOUS; SUBCUTANEOUS
Status: DISCONTINUED | OUTPATIENT
Start: 2024-11-02 | End: 2024-11-04 | Stop reason: HOSPADM

## 2024-11-02 RX ORDER — SODIUM CHLORIDE, SODIUM LACTATE, POTASSIUM CHLORIDE, CALCIUM CHLORIDE 600; 310; 30; 20 MG/100ML; MG/100ML; MG/100ML; MG/100ML
INJECTION, SOLUTION INTRAVENOUS CONTINUOUS
Status: ACTIVE | OUTPATIENT
Start: 2024-11-02 | End: 2024-11-02

## 2024-11-02 RX ADMIN — ALLOPURINOL 100 MG: 100 TABLET ORAL at 21:18

## 2024-11-02 RX ADMIN — Medication 50 MG: at 09:38

## 2024-11-02 RX ADMIN — RIFAXIMIN 400 MG: 200 TABLET ORAL at 21:18

## 2024-11-02 RX ADMIN — INSULIN LISPRO 1 UNITS: 100 INJECTION, SOLUTION INTRAVENOUS; SUBCUTANEOUS at 21:31

## 2024-11-02 RX ADMIN — MORPHINE SULFATE 4 MG: 4 INJECTION, SOLUTION INTRAMUSCULAR; INTRAVENOUS at 15:14

## 2024-11-02 RX ADMIN — ENOXAPARIN SODIUM 30 MG: 100 INJECTION SUBCUTANEOUS at 21:18

## 2024-11-02 RX ADMIN — RIFAXIMIN 400 MG: 200 TABLET ORAL at 15:46

## 2024-11-02 RX ADMIN — WATER 1000 MG: 1 INJECTION INTRAMUSCULAR; INTRAVENOUS; SUBCUTANEOUS at 11:15

## 2024-11-02 RX ADMIN — INSULIN LISPRO 1 UNITS: 100 INJECTION, SOLUTION INTRAVENOUS; SUBCUTANEOUS at 12:37

## 2024-11-02 RX ADMIN — PANTOPRAZOLE SODIUM 40 MG: 40 TABLET, DELAYED RELEASE ORAL at 15:14

## 2024-11-02 RX ADMIN — THIAMINE HYDROCHLORIDE 100 MG: 100 INJECTION, SOLUTION INTRAMUSCULAR; INTRAVENOUS at 09:39

## 2024-11-02 RX ADMIN — LORAZEPAM 0.5 MG: 2 INJECTION INTRAMUSCULAR; INTRAVENOUS at 19:25

## 2024-11-02 RX ADMIN — ENOXAPARIN SODIUM 30 MG: 100 INJECTION SUBCUTANEOUS at 09:37

## 2024-11-02 RX ADMIN — MORPHINE SULFATE 4 MG: 4 INJECTION, SOLUTION INTRAMUSCULAR; INTRAVENOUS at 09:38

## 2024-11-02 RX ADMIN — SPIRONOLACTONE 100 MG: 25 TABLET ORAL at 09:37

## 2024-11-02 RX ADMIN — LACTULOSE 20 G: 20 SOLUTION ORAL at 09:37

## 2024-11-02 RX ADMIN — ALLOPURINOL 100 MG: 100 TABLET ORAL at 09:38

## 2024-11-02 RX ADMIN — INSULIN LISPRO 1 UNITS: 100 INJECTION, SOLUTION INTRAVENOUS; SUBCUTANEOUS at 06:57

## 2024-11-02 RX ADMIN — RIFAXIMIN 400 MG: 200 TABLET ORAL at 09:38

## 2024-11-02 RX ADMIN — MORPHINE SULFATE 4 MG: 4 INJECTION, SOLUTION INTRAMUSCULAR; INTRAVENOUS at 19:25

## 2024-11-02 RX ADMIN — MICONAZOLE NITRATE: 20.6 POWDER TOPICAL at 21:19

## 2024-11-02 RX ADMIN — PETROLATUM: 420 OINTMENT TOPICAL at 21:19

## 2024-11-02 RX ADMIN — PANTOPRAZOLE SODIUM 40 MG: 40 TABLET, DELAYED RELEASE ORAL at 06:58

## 2024-11-02 RX ADMIN — SODIUM CHLORIDE, PRESERVATIVE FREE 10 ML: 5 INJECTION INTRAVENOUS at 21:18

## 2024-11-02 RX ADMIN — PETROLATUM: 420 OINTMENT TOPICAL at 09:45

## 2024-11-02 RX ADMIN — LEVETIRACETAM 500 MG: 500 TABLET, FILM COATED ORAL at 09:38

## 2024-11-02 RX ADMIN — SODIUM CHLORIDE, POTASSIUM CHLORIDE, SODIUM LACTATE AND CALCIUM CHLORIDE: 600; 310; 30; 20 INJECTION, SOLUTION INTRAVENOUS at 12:44

## 2024-11-02 RX ADMIN — SODIUM CHLORIDE, PRESERVATIVE FREE 10 ML: 5 INJECTION INTRAVENOUS at 09:37

## 2024-11-02 RX ADMIN — INSULIN LISPRO 1 UNITS: 100 INJECTION, SOLUTION INTRAVENOUS; SUBCUTANEOUS at 16:54

## 2024-11-02 RX ADMIN — LEVETIRACETAM 500 MG: 500 TABLET, FILM COATED ORAL at 21:18

## 2024-11-02 RX ADMIN — MICONAZOLE NITRATE: 20.6 POWDER TOPICAL at 09:38

## 2024-11-02 ASSESSMENT — PAIN SCALES - GENERAL: PAINLEVEL_OUTOF10: 5

## 2024-11-02 ASSESSMENT — PAIN DESCRIPTION - DESCRIPTORS: DESCRIPTORS: ACHING;DISCOMFORT

## 2024-11-02 ASSESSMENT — PAIN DESCRIPTION - LOCATION: LOCATION: GENERALIZED

## 2024-11-02 NOTE — PLAN OF CARE
Problem: Skin/Tissue Integrity  Goal: Absence of new skin breakdown  Description: 1.  Monitor for areas of redness and/or skin breakdown  2.  Assess vascular access sites hourly  3.  Every 4-6 hours minimum:  Change oxygen saturation probe site  4.  Every 4-6 hours:  If on nasal continuous positive airway pressure, respiratory therapy assess nares and determine need for appliance change or resting period.  Outcome: Progressing     Problem: Chronic Conditions and Co-morbidities  Goal: Patient's chronic conditions and co-morbidity symptoms are monitored and maintained or improved  Outcome: Progressing     Problem: Discharge Planning  Goal: Discharge to home or other facility with appropriate resources  Outcome: Progressing     Problem: Safety - Adult  Goal: Free from fall injury  Outcome: Progressing     Problem: Pain  Goal: Verbalizes/displays adequate comfort level or baseline comfort level  Outcome: Progressing     Problem: Nutrition Deficit:  Goal: Optimize nutritional status  Outcome: Progressing

## 2024-11-03 LAB
ALBUMIN SERPL-MCNC: 1.8 G/DL (ref 3.5–5.2)
ALP SERPL-CCNC: 270 U/L (ref 35–104)
ALT SERPL-CCNC: 11 U/L (ref 0–32)
ANION GAP SERPL CALCULATED.3IONS-SCNC: 5 MMOL/L (ref 7–16)
AST SERPL-CCNC: 39 U/L (ref 0–31)
BILIRUB DIRECT SERPL-MCNC: 0.5 MG/DL (ref 0–0.3)
BILIRUB INDIRECT SERPL-MCNC: 0.7 MG/DL (ref 0–1)
BILIRUB SERPL-MCNC: 1.2 MG/DL (ref 0–1.2)
BUN SERPL-MCNC: 35 MG/DL (ref 6–23)
CALCIUM SERPL-MCNC: 11 MG/DL (ref 8.6–10.2)
CHLORIDE SERPL-SCNC: 113 MMOL/L (ref 98–107)
CO2 SERPL-SCNC: 30 MMOL/L (ref 22–29)
CREAT SERPL-MCNC: 1.7 MG/DL (ref 0.5–1)
GFR, ESTIMATED: 34 ML/MIN/1.73M2
GLUCOSE BLD-MCNC: 218 MG/DL (ref 74–99)
GLUCOSE BLD-MCNC: 228 MG/DL (ref 74–99)
GLUCOSE BLD-MCNC: 238 MG/DL (ref 74–99)
GLUCOSE BLD-MCNC: 240 MG/DL (ref 74–99)
GLUCOSE SERPL-MCNC: 236 MG/DL (ref 74–99)
HBA1C MFR BLD: 4.8 % (ref 4–5.6)
MICROORGANISM SPEC CULT: NORMAL
POTASSIUM SERPL-SCNC: 5 MMOL/L (ref 3.5–5)
PROT SERPL-MCNC: 5.2 G/DL (ref 6.4–8.3)
SERVICE CMNT-IMP: NORMAL
SODIUM SERPL-SCNC: 148 MMOL/L (ref 132–146)
SPECIMEN DESCRIPTION: NORMAL

## 2024-11-03 PROCEDURE — 6360000002 HC RX W HCPCS: Performed by: SURGERY

## 2024-11-03 PROCEDURE — 99232 SBSQ HOSP IP/OBS MODERATE 35: CPT | Performed by: INTERNAL MEDICINE

## 2024-11-03 PROCEDURE — 80053 COMPREHEN METABOLIC PANEL: CPT

## 2024-11-03 PROCEDURE — 6360000002 HC RX W HCPCS: Performed by: STUDENT IN AN ORGANIZED HEALTH CARE EDUCATION/TRAINING PROGRAM

## 2024-11-03 PROCEDURE — 2580000003 HC RX 258: Performed by: SURGERY

## 2024-11-03 PROCEDURE — 82962 GLUCOSE BLOOD TEST: CPT

## 2024-11-03 PROCEDURE — 6360000002 HC RX W HCPCS: Performed by: NURSE PRACTITIONER

## 2024-11-03 PROCEDURE — 6370000000 HC RX 637 (ALT 250 FOR IP): Performed by: INTERNAL MEDICINE

## 2024-11-03 PROCEDURE — 2580000003 HC RX 258: Performed by: STUDENT IN AN ORGANIZED HEALTH CARE EDUCATION/TRAINING PROGRAM

## 2024-11-03 PROCEDURE — 99231 SBSQ HOSP IP/OBS SF/LOW 25: CPT | Performed by: NURSE PRACTITIONER

## 2024-11-03 PROCEDURE — 2700000000 HC OXYGEN THERAPY PER DAY

## 2024-11-03 PROCEDURE — 82248 BILIRUBIN DIRECT: CPT

## 2024-11-03 PROCEDURE — 2060000000 HC ICU INTERMEDIATE R&B

## 2024-11-03 PROCEDURE — 83036 HEMOGLOBIN GLYCOSYLATED A1C: CPT

## 2024-11-03 PROCEDURE — 6370000000 HC RX 637 (ALT 250 FOR IP): Performed by: SURGERY

## 2024-11-03 RX ORDER — MORPHINE SULFATE 4 MG/ML
4 INJECTION, SOLUTION INTRAMUSCULAR; INTRAVENOUS
Status: DISCONTINUED | OUTPATIENT
Start: 2024-11-03 | End: 2024-11-04 | Stop reason: HOSPADM

## 2024-11-03 RX ADMIN — MORPHINE SULFATE 4 MG: 4 INJECTION, SOLUTION INTRAMUSCULAR; INTRAVENOUS at 11:42

## 2024-11-03 RX ADMIN — RIFAXIMIN 400 MG: 200 TABLET ORAL at 20:16

## 2024-11-03 RX ADMIN — MORPHINE SULFATE 4 MG: 4 INJECTION, SOLUTION INTRAMUSCULAR; INTRAVENOUS at 17:27

## 2024-11-03 RX ADMIN — MICONAZOLE NITRATE: 20.6 POWDER TOPICAL at 20:45

## 2024-11-03 RX ADMIN — SODIUM CHLORIDE, PRESERVATIVE FREE 10 ML: 5 INJECTION INTRAVENOUS at 08:14

## 2024-11-03 RX ADMIN — SODIUM CHLORIDE, PRESERVATIVE FREE 10 ML: 5 INJECTION INTRAVENOUS at 20:17

## 2024-11-03 RX ADMIN — LEVETIRACETAM 500 MG: 500 TABLET, FILM COATED ORAL at 08:10

## 2024-11-03 RX ADMIN — MORPHINE SULFATE 4 MG: 4 INJECTION, SOLUTION INTRAMUSCULAR; INTRAVENOUS at 20:32

## 2024-11-03 RX ADMIN — LEVETIRACETAM 500 MG: 500 TABLET, FILM COATED ORAL at 20:16

## 2024-11-03 RX ADMIN — RIFAXIMIN 400 MG: 200 TABLET ORAL at 14:42

## 2024-11-03 RX ADMIN — INSULIN LISPRO 1 UNITS: 100 INJECTION, SOLUTION INTRAVENOUS; SUBCUTANEOUS at 05:23

## 2024-11-03 RX ADMIN — INSULIN LISPRO 1 UNITS: 100 INJECTION, SOLUTION INTRAVENOUS; SUBCUTANEOUS at 18:57

## 2024-11-03 RX ADMIN — ALLOPURINOL 100 MG: 100 TABLET ORAL at 20:16

## 2024-11-03 RX ADMIN — PETROLATUM: 420 OINTMENT TOPICAL at 20:45

## 2024-11-03 RX ADMIN — PETROLATUM: 420 OINTMENT TOPICAL at 08:12

## 2024-11-03 RX ADMIN — MICONAZOLE NITRATE: 20.6 POWDER TOPICAL at 08:12

## 2024-11-03 RX ADMIN — MORPHINE SULFATE 4 MG: 4 INJECTION, SOLUTION INTRAMUSCULAR; INTRAVENOUS at 14:42

## 2024-11-03 RX ADMIN — WATER 1000 MG: 1 INJECTION INTRAMUSCULAR; INTRAVENOUS; SUBCUTANEOUS at 11:42

## 2024-11-03 RX ADMIN — MORPHINE SULFATE 4 MG: 4 INJECTION, SOLUTION INTRAMUSCULAR; INTRAVENOUS at 05:23

## 2024-11-03 RX ADMIN — LORAZEPAM 0.5 MG: 2 INJECTION INTRAMUSCULAR; INTRAVENOUS at 17:27

## 2024-11-03 RX ADMIN — ALLOPURINOL 100 MG: 100 TABLET ORAL at 08:10

## 2024-11-03 RX ADMIN — Medication 50 MG: at 08:10

## 2024-11-03 RX ADMIN — THIAMINE HYDROCHLORIDE 100 MG: 100 INJECTION, SOLUTION INTRAMUSCULAR; INTRAVENOUS at 08:11

## 2024-11-03 RX ADMIN — MORPHINE SULFATE 4 MG: 4 INJECTION, SOLUTION INTRAMUSCULAR; INTRAVENOUS at 08:08

## 2024-11-03 RX ADMIN — INSULIN LISPRO 1 UNITS: 100 INJECTION, SOLUTION INTRAVENOUS; SUBCUTANEOUS at 11:42

## 2024-11-03 RX ADMIN — SPIRONOLACTONE 100 MG: 25 TABLET ORAL at 08:10

## 2024-11-03 RX ADMIN — RIFAXIMIN 400 MG: 200 TABLET ORAL at 08:13

## 2024-11-03 RX ADMIN — MORPHINE SULFATE 4 MG: 4 INJECTION, SOLUTION INTRAMUSCULAR; INTRAVENOUS at 02:01

## 2024-11-03 RX ADMIN — ENOXAPARIN SODIUM 30 MG: 100 INJECTION SUBCUTANEOUS at 20:16

## 2024-11-03 RX ADMIN — ENOXAPARIN SODIUM 30 MG: 100 INJECTION SUBCUTANEOUS at 08:08

## 2024-11-03 RX ADMIN — INSULIN LISPRO 1 UNITS: 100 INJECTION, SOLUTION INTRAVENOUS; SUBCUTANEOUS at 20:44

## 2024-11-03 ASSESSMENT — PAIN SCALES - GENERAL
PAINLEVEL_OUTOF10: 10
PAINLEVEL_OUTOF10: 0
PAINLEVEL_OUTOF10: 0
PAINLEVEL_OUTOF10: 8
PAINLEVEL_OUTOF10: 0
PAINLEVEL_OUTOF10: 10

## 2024-11-03 ASSESSMENT — PAIN DESCRIPTION - LOCATION: LOCATION: ABDOMEN

## 2024-11-03 ASSESSMENT — PAIN SCALES - WONG BAKER: WONGBAKER_NUMERICALRESPONSE: NO HURT

## 2024-11-03 NOTE — PLAN OF CARE
Problem: Skin/Tissue Integrity  Goal: Absence of new skin breakdown  Description: 1.  Monitor for areas of redness and/or skin breakdown  2.  Assess vascular access sites hourly  3.  Every 4-6 hours minimum:  Change oxygen saturation probe site  4.  Every 4-6 hours:  If on nasal continuous positive airway pressure, respiratory therapy assess nares and determine need for appliance change or resting period.  11/2/2024 2313 by Divina Ram RN  Outcome: Progressing  11/2/2024 1008 by Alberta Dove RN  Outcome: Progressing     Problem: Chronic Conditions and Co-morbidities  Goal: Patient's chronic conditions and co-morbidity symptoms are monitored and maintained or improved  11/2/2024 2313 by Divina Ram RN  Outcome: Progressing  11/2/2024 1008 by Alberta Dove RN  Outcome: Progressing     Problem: Discharge Planning  Goal: Discharge to home or other facility with appropriate resources  11/2/2024 2313 by Divina Ram RN  Outcome: Progressing  11/2/2024 1008 by Alberta Dove RN  Outcome: Progressing     Problem: Safety - Adult  Goal: Free from fall injury  11/2/2024 2313 by Divina Ram RN  Outcome: Progressing  11/2/2024 1008 by Alberta Dove RN  Outcome: Progressing  Flowsheets (Taken 11/2/2024 0800)  Free From Fall Injury: Instruct family/caregiver on patient safety     Problem: Pain  Goal: Verbalizes/displays adequate comfort level or baseline comfort level  11/2/2024 2313 by Divina Ram RN  Outcome: Progressing  11/2/2024 1008 by Alberta Dove RN  Outcome: Progressing     Problem: Nutrition Deficit:  Goal: Optimize nutritional status  11/2/2024 2313 by Divina Ram RN  Outcome: Progressing  11/2/2024 1008 by Alberta Dove RN  Outcome: Progressing

## 2024-11-03 NOTE — PLAN OF CARE
Problem: Skin/Tissue Integrity  Goal: Absence of new skin breakdown  Description: 1.  Monitor for areas of redness and/or skin breakdown  2.  Assess vascular access sites hourly  3.  Every 4-6 hours minimum:  Change oxygen saturation probe site  4.  Every 4-6 hours:  If on nasal continuous positive airway pressure, respiratory therapy assess nares and determine need for appliance change or resting period.  11/3/2024 0854 by Alberta Dove RN  Outcome: Progressing  11/2/2024 2313 by Divina Ram RN  Outcome: Progressing     Problem: Chronic Conditions and Co-morbidities  Goal: Patient's chronic conditions and co-morbidity symptoms are monitored and maintained or improved  11/3/2024 0854 by Alberta Dove RN  Outcome: Progressing  11/2/2024 2313 by Divina Ram RN  Outcome: Progressing     Problem: Discharge Planning  Goal: Discharge to home or other facility with appropriate resources  11/3/2024 0854 by Alberta Dove RN  Outcome: Progressing  11/2/2024 2313 by Divina Ram RN  Outcome: Progressing     Problem: Safety - Adult  Goal: Free from fall injury  11/3/2024 0854 by Alberta Dove RN  Outcome: Progressing  11/2/2024 2313 by Divina Ram RN  Outcome: Progressing     Problem: Pain  Goal: Verbalizes/displays adequate comfort level or baseline comfort level  11/3/2024 0854 by Alberta Dove RN  Outcome: Progressing  11/2/2024 2313 by Divina Ram RN  Outcome: Progressing     Problem: Nutrition Deficit:  Goal: Optimize nutritional status  11/3/2024 0854 by Alberta Dove RN  Outcome: Progressing  11/2/2024 2313 by Divina Ram RN  Outcome: Progressing

## 2024-11-03 NOTE — PLAN OF CARE
Problem: Skin/Tissue Integrity  Goal: Absence of new skin breakdown  Description: 1.  Monitor for areas of redness and/or skin breakdown  2.  Assess vascular access sites hourly  3.  Every 4-6 hours minimum:  Change oxygen saturation probe site  4.  Every 4-6 hours:  If on nasal continuous positive airway pressure, respiratory therapy assess nares and determine need for appliance change or resting period.  11/3/2024 1408 by Alberta Dove RN  Outcome: Progressing  11/3/2024 0854 by Alberta Dove RN  Outcome: Progressing     Problem: Chronic Conditions and Co-morbidities  Goal: Patient's chronic conditions and co-morbidity symptoms are monitored and maintained or improved  11/3/2024 1408 by Alberta Dove RN  Outcome: Progressing  11/3/2024 0854 by Alberta Dove RN  Outcome: Progressing     Problem: Discharge Planning  Goal: Discharge to home or other facility with appropriate resources  11/3/2024 1408 by Alberta Dove RN  Outcome: Progressing  11/3/2024 0854 by Alberta Dove RN  Outcome: Progressing     Problem: Safety - Adult  Goal: Free from fall injury  11/3/2024 1408 by Alberta Dove RN  Outcome: Progressing  11/3/2024 0854 by Alberta Dove RN  Outcome: Progressing  Flowsheets (Taken 11/3/2024 0800)  Free From Fall Injury: Instruct family/caregiver on patient safety     Problem: Pain  Goal: Verbalizes/displays adequate comfort level or baseline comfort level  11/3/2024 1408 by Alberta Dove RN  Outcome: Progressing  11/3/2024 0854 by Alberta Dove RN  Outcome: Progressing     Problem: Nutrition Deficit:  Goal: Optimize nutritional status  11/3/2024 1408 by Alberta Dove RN  Outcome: Progressing  11/3/2024 0854 by Alberta Dove RN  Outcome: Progressing     Problem: ABCDS Injury Assessment  Goal: Absence of physical injury  Outcome: Progressing  Flowsheets (Taken 11/3/2024 0800)  Absence of Physical Injury: Implement safety measures based on patient assessment

## 2024-11-04 VITALS
BODY MASS INDEX: 43.76 KG/M2 | OXYGEN SATURATION: 94 % | WEIGHT: 256.3 LBS | HEIGHT: 64 IN | RESPIRATION RATE: 16 BRPM | TEMPERATURE: 97.4 F | DIASTOLIC BLOOD PRESSURE: 59 MMHG | SYSTOLIC BLOOD PRESSURE: 110 MMHG | HEART RATE: 111 BPM

## 2024-11-04 PROBLEM — K75.81 LIVER CIRRHOSIS SECONDARY TO NASH (HCC): Status: ACTIVE | Noted: 2024-11-04

## 2024-11-04 PROBLEM — K74.60 LIVER CIRRHOSIS SECONDARY TO NASH (HCC): Status: ACTIVE | Noted: 2024-11-04

## 2024-11-04 LAB
ANION GAP SERPL CALCULATED.3IONS-SCNC: 4 MMOL/L (ref 7–16)
BUN SERPL-MCNC: 40 MG/DL (ref 6–23)
CALCIUM SERPL-MCNC: 10.8 MG/DL (ref 8.6–10.2)
CHLORIDE SERPL-SCNC: 112 MMOL/L (ref 98–107)
CO2 SERPL-SCNC: 29 MMOL/L (ref 22–29)
CREAT SERPL-MCNC: 1.9 MG/DL (ref 0.5–1)
GFR, ESTIMATED: 29 ML/MIN/1.73M2
GLUCOSE BLD-MCNC: 205 MG/DL (ref 74–99)
GLUCOSE SERPL-MCNC: 242 MG/DL (ref 74–99)
POTASSIUM SERPL-SCNC: 5.2 MMOL/L (ref 3.5–5)
SODIUM SERPL-SCNC: 145 MMOL/L (ref 132–146)

## 2024-11-04 PROCEDURE — 2580000003 HC RX 258: Performed by: STUDENT IN AN ORGANIZED HEALTH CARE EDUCATION/TRAINING PROGRAM

## 2024-11-04 PROCEDURE — 6370000000 HC RX 637 (ALT 250 FOR IP): Performed by: INTERNAL MEDICINE

## 2024-11-04 PROCEDURE — 6360000002 HC RX W HCPCS: Performed by: STUDENT IN AN ORGANIZED HEALTH CARE EDUCATION/TRAINING PROGRAM

## 2024-11-04 PROCEDURE — 99238 HOSP IP/OBS DSCHRG MGMT 30/<: CPT | Performed by: INTERNAL MEDICINE

## 2024-11-04 PROCEDURE — 6370000000 HC RX 637 (ALT 250 FOR IP): Performed by: SURGERY

## 2024-11-04 PROCEDURE — 6360000002 HC RX W HCPCS: Performed by: NURSE PRACTITIONER

## 2024-11-04 PROCEDURE — 82962 GLUCOSE BLOOD TEST: CPT

## 2024-11-04 PROCEDURE — 80048 BASIC METABOLIC PNL TOTAL CA: CPT

## 2024-11-04 PROCEDURE — 6360000002 HC RX W HCPCS: Performed by: SURGERY

## 2024-11-04 PROCEDURE — 2700000000 HC OXYGEN THERAPY PER DAY

## 2024-11-04 RX ORDER — LORAZEPAM 2 MG/ML
1 INJECTION INTRAMUSCULAR
Status: DISCONTINUED | OUTPATIENT
Start: 2024-11-04 | End: 2024-11-04 | Stop reason: HOSPADM

## 2024-11-04 RX ORDER — GLYCOPYRROLATE 0.2 MG/ML
0.4 INJECTION INTRAMUSCULAR; INTRAVENOUS EVERY 6 HOURS PRN
Status: DISCONTINUED | OUTPATIENT
Start: 2024-11-04 | End: 2024-11-04 | Stop reason: HOSPADM

## 2024-11-04 RX ADMIN — THIAMINE HYDROCHLORIDE 100 MG: 100 INJECTION, SOLUTION INTRAMUSCULAR; INTRAVENOUS at 08:45

## 2024-11-04 RX ADMIN — MORPHINE SULFATE 4 MG: 4 INJECTION, SOLUTION INTRAMUSCULAR; INTRAVENOUS at 00:44

## 2024-11-04 RX ADMIN — MICONAZOLE NITRATE: 20.6 POWDER TOPICAL at 08:48

## 2024-11-04 RX ADMIN — SPIRONOLACTONE 100 MG: 25 TABLET ORAL at 08:45

## 2024-11-04 RX ADMIN — GLYCOPYRROLATE 0.4 MG: 0.2 INJECTION INTRAMUSCULAR; INTRAVENOUS at 09:32

## 2024-11-04 RX ADMIN — RIFAXIMIN 400 MG: 200 TABLET ORAL at 08:44

## 2024-11-04 RX ADMIN — INSULIN LISPRO 1 UNITS: 100 INJECTION, SOLUTION INTRAVENOUS; SUBCUTANEOUS at 05:43

## 2024-11-04 RX ADMIN — LORAZEPAM 0.5 MG: 2 INJECTION INTRAMUSCULAR; INTRAVENOUS at 08:59

## 2024-11-04 RX ADMIN — LORAZEPAM 0.5 MG: 2 INJECTION INTRAMUSCULAR; INTRAVENOUS at 00:44

## 2024-11-04 RX ADMIN — LEVETIRACETAM 500 MG: 500 TABLET, FILM COATED ORAL at 08:45

## 2024-11-04 RX ADMIN — MORPHINE SULFATE 4 MG: 4 INJECTION, SOLUTION INTRAMUSCULAR; INTRAVENOUS at 04:14

## 2024-11-04 RX ADMIN — MORPHINE SULFATE 4 MG: 4 INJECTION, SOLUTION INTRAMUSCULAR; INTRAVENOUS at 08:59

## 2024-11-04 RX ADMIN — ALLOPURINOL 100 MG: 100 TABLET ORAL at 08:45

## 2024-11-04 RX ADMIN — Medication 50 MG: at 08:45

## 2024-11-04 RX ADMIN — MORPHINE SULFATE 4 MG: 4 INJECTION, SOLUTION INTRAMUSCULAR; INTRAVENOUS at 11:58

## 2024-11-04 RX ADMIN — LORAZEPAM 1 MG: 2 INJECTION INTRAMUSCULAR; INTRAVENOUS at 12:06

## 2024-11-04 RX ADMIN — PETROLATUM: 420 OINTMENT TOPICAL at 08:49

## 2024-11-04 ASSESSMENT — PAIN DESCRIPTION - FREQUENCY
FREQUENCY: CONTINUOUS
FREQUENCY: CONTINUOUS

## 2024-11-04 ASSESSMENT — PAIN DESCRIPTION - ONSET
ONSET: ON-GOING
ONSET: ON-GOING

## 2024-11-04 ASSESSMENT — PAIN DESCRIPTION - PAIN TYPE
TYPE: CHRONIC PAIN
TYPE: CHRONIC PAIN

## 2024-11-04 ASSESSMENT — PAIN DESCRIPTION - DESCRIPTORS
DESCRIPTORS: ACHING;DULL;SORE
DESCRIPTORS: ACHING;DULL;SORE

## 2024-11-04 ASSESSMENT — PAIN SCALES - GENERAL
PAINLEVEL_OUTOF10: 0
PAINLEVEL_OUTOF10: 10
PAINLEVEL_OUTOF10: 0

## 2024-11-04 ASSESSMENT — PAIN DESCRIPTION - LOCATION
LOCATION: GENERALIZED
LOCATION: GENERALIZED

## 2024-11-04 NOTE — PLAN OF CARE
Problem: Skin/Tissue Integrity  Goal: Absence of new skin breakdown  Description: 1.  Monitor for areas of redness and/or skin breakdown  2.  Assess vascular access sites hourly  3.  Every 4-6 hours minimum:  Change oxygen saturation probe site  4.  Every 4-6 hours:  If on nasal continuous positive airway pressure, respiratory therapy assess nares and determine need for appliance change or resting period.  11/3/2024 2227 by Divina Ram RN  Outcome: Progressing  11/3/2024 1408 by Alberta Dove RN  Outcome: Progressing  11/3/2024 0854 by Alberta Dove RN  Outcome: Progressing     Problem: Chronic Conditions and Co-morbidities  Goal: Patient's chronic conditions and co-morbidity symptoms are monitored and maintained or improved  11/3/2024 2227 by Divina Ram RN  Outcome: Progressing  11/3/2024 1408 by Alberta Dove RN  Outcome: Progressing  11/3/2024 0854 by Alberta Dove RN  Outcome: Progressing     Problem: Discharge Planning  Goal: Discharge to home or other facility with appropriate resources  11/3/2024 2227 by Divina Ram RN  Outcome: Progressing  11/3/2024 1408 by Alberta Dove RN  Outcome: Progressing  11/3/2024 0854 by Alberta Dove RN  Outcome: Progressing     Problem: Safety - Adult  Goal: Free from fall injury  11/3/2024 2227 by Divina Ram RN  Outcome: Progressing  11/3/2024 1408 by Alberta Dove RN  Outcome: Progressing  11/3/2024 0854 by Alberta Dove RN  Outcome: Progressing  Flowsheets (Taken 11/3/2024 0800)  Free From Fall Injury: Instruct family/caregiver on patient safety     Problem: Pain  Goal: Verbalizes/displays adequate comfort level or baseline comfort level  11/3/2024 2227 by Divina Ram RN  Outcome: Progressing  11/3/2024 1408 by Alberta Dove RN  Outcome: Progressing  11/3/2024 0854 by Alberta Dove RN  Outcome: Progressing     Problem: Nutrition Deficit:  Goal: Optimize nutritional status  11/3/2024 2227 by Divina Ram RN  Outcome:

## 2024-11-04 NOTE — PROGRESS NOTES
[facility] Hospitalist Progress Note    Admitting Date and Time: 10/29/2024  9:03 PM  Admit Dx: Altered mental status, unspecified altered mental status type [R41.82]  AMS (altered mental status) [R41.82]    Subjective:  Patient is being followed for Altered mental status, unspecified altered mental status type [R41.82]  AMS (altered mental status) [R41.82]     No acute events overnight.  Patient is drowsy after receiving morphine and Ativan.  Palliative care on board managing, morphine.  Discussed with  who is at bedside.  They have requested the tube feed rate to be changed to 50 since higher rate and was given during NG tube and recent hospitalization caused nausea for the patient.  Before receiving the morphine the patient still was not back to her baseline but was improved since presentation.  He states she was recently admitted to [facility] with similar presentation, after an NG tube with tube feeds her symptoms improved significantly.  She is on oxycodone at the nursing home due to pain from head to toe.  Recently was increased to 10 mg but she was still had pain.    ROS: denies fever, chills, cp, sob, n/v, HA unless stated above.      insulin lispro  0-4 Units SubCUTAneous 4x Daily AC & HS    pantoprazole  40 mg Oral BID AC    cefTRIAXone (ROCEPHIN) IV  1,000 mg IntraVENous Q24H    miconazole   Topical BID    white petrolatum   Topical BID    sodium chloride flush  5-40 mL IntraVENous 2 times per day    enoxaparin  30 mg SubCUTAneous BID    allopurinol  100 mg Oral BID    levETIRAcetam  500 mg Oral BID    lactulose  20 g Oral TID    [Held by provider] melatonin  6 mg Oral Nightly    insulin lispro  0-4 Units SubCUTAneous 4x Daily AC & HS    zinc sulfate  50 mg Oral Daily    thiamine  100 mg IntraVENous Daily    spironolactone  100 mg Oral Daily    rifAXIMin  400 mg Oral TID     glucose, 4 tablet, PRN  dextrose bolus, 125 mL, PRN   Or  dextrose bolus, 250 mL, PRN  glucagon (rDNA), 1 mg, 
       [facility] Hospitalist Progress Note    Admitting Date and Time: 10/29/2024  9:03 PM  Admit Dx: Altered mental status, unspecified altered mental status type [R41.82]  AMS (altered mental status) [R41.82]    Subjective:  Patient is being followed for Altered mental status, unspecified altered mental status type [R41.82]  AMS (altered mental status) [R41.82]     No acute events overnight.  Patient is alert however is not oriented.  Patient has PEG tube placed and she is started on tube feeding today.    ROS: denies fever, chills, cp, sob, n/v, HA unless stated above.      sodium chloride flush  5-40 mL IntraVENous 2 times per day    [Held by provider] enoxaparin  30 mg SubCUTAneous BID    allopurinol  100 mg Oral BID    levETIRAcetam  500 mg Oral BID    lactulose  20 g Oral TID    [Held by provider] melatonin  6 mg Oral Nightly    insulin lispro  0-4 Units SubCUTAneous 4x Daily AC & HS    zinc sulfate  50 mg Oral Daily    thiamine  100 mg IntraVENous Daily    spironolactone  100 mg Oral Daily    rifAXIMin  400 mg Oral TID     sodium chloride flush, 5-40 mL, PRN  sodium chloride, , PRN  potassium chloride, 40 mEq, PRN   Or  potassium alternative oral replacement, 40 mEq, PRN   Or  potassium chloride, 10 mEq, PRN  magnesium sulfate, 2,000 mg, PRN  ondansetron, 4 mg, Q8H PRN   Or  ondansetron, 4 mg, Q6H PRN  polyethylene glycol, 17 g, Daily PRN  acetaminophen, 650 mg, Q6H PRN   Or  acetaminophen, 650 mg, Q6H PRN  glucose, 4 tablet, PRN  dextrose bolus, 125 mL, PRN   Or  dextrose bolus, 250 mL, PRN  glucagon (rDNA), 1 mg, PRN  dextrose, , Continuous PRN  oxyCODONE, 10 mg, Q4H PRN  hydrOXYzine pamoate, 25 mg, TID PRN  sodium chloride flush, 10 mL, Once PRN         Objective:    BP (!) 117/53   Pulse (!) 102   Temp 97.7 °F (36.5 °C) (Temporal)   Resp 20   Ht 1.626 m (5' 4\")   Wt 118.8 kg (262 lb)   SpO2 99%   BMI 44.97 kg/m²     General Appearance: alert and not oriented to person, place or time and in no 
  Physician Progress Note      PATIENT:               UCHE ZAVALA  CSN #:                  [id number]  :                       1965  ADMIT DATE:       10/29/2024 9:03 PM  DISCH DATE:  RESPONDING  PROVIDER #:        Lilo Jones MD          QUERY TEXT:    Pt admitted with Altered mental status and has severe  malnutrition documented   in RD Consult note 10/31  . Please further specify type of malnutrition with   documentation in the medical record.    The medical record reflects the following:  Risk Factors: dysphagia, AMS,  Clinical Indicators: RD consult note \"Malnutrition Status:  Severe   malnutrition (10/31/24 1519)  Context:  Chronic Illness  Findings of the 6 clinical characteristics of malnutrition:  Energy Intake:  75% or less estimated energy requirements for 1 month or   longer  Weight Loss:  Unable to assess (d/t fluid shifts w/ cirrhosis)  Body Fat Loss:  Severe body fat loss Orbital, Triceps, Buccal region  Muscle Mass Loss:  Severe muscle mass loss Temples (temporalis), Clavicles   (pectoralis & deltoids)  Fluid Accumulation:  No fluid accumulation   Strength:  Not Performed  Treatment: PEG insertion, TF at 60ml/hr    ASPEN Criteria:    https://aspenjournals.onlinelibrary.tang.com/doi/full/10.1177/083125842584948  5  Options provided:  -- Severe Malnutrition  -- Severe Protein calorie malnutrition  -- Other - I will add my own diagnosis  -- Disagree - Not applicable / Not valid  -- Disagree - Clinically unable to determine / Unknown  -- Refer to Clinical Documentation Reviewer    PROVIDER RESPONSE TEXT:    This patient has severe protein calorie malnutrition.    Query created by: Muna Segovia on 2024 12:50 PM      Electronically signed by:  Lilo Jones MD 2024 12:53 PM          
4 Eyes Skin Assessment     NAME:  Lissa Diggs  YOB: 1965  MEDICAL RECORD NUMBER:  [id number]    The patient is being assessed for  Admission    I agree that at least one RN has performed a thorough Head to Toe Skin Assessment on the patient. ALL assessment sites listed below have been assessed.      Areas assessed by both nurses:    Head, Face, Ears, Shoulders, Back, Chest, Arms, Elbows, Hands, Sacrum. Buttock, Coccyx, Ischium, Legs. Feet and Heels, and Under Medical Devices         Does the Patient have a Wound? Yes wound(s) were present on assessment. LDA wound assessment was Initiated and completed by RN       Rylan Prevention initiated by RN: Yes  Wound Care Orders initiated by RN: Yes    Pressure Injury (Stage 3,4, Unstageable, DTI, NWPT, and Complex wounds) if present, place Wound referral order by RN under : Yes    New Ostomies, if present place, Ostomy referral order under : No     Nurse 1 eSignature: Electronically signed by Ángel Jones RN on 10/31/24 at 7:26 AM EDT    **SHARE this note so that the co-signing nurse can place an eSignature**    Nurse 2 eSignature: {Esignature:[id number]}    
[facility]  PROGRESS NOTE  ATTENDING NOTE      I canceled the US GB.  There is no reason to get it.  She does not have acute cholecystitis.  You can see the stones on the CT scan, no reason to get an ultrasound.    Rosario Turk MD, MSc, FACS  10/31/2024  6:27 AM    
Dr. Cerda notified of patient being unable to tolerate tube feed. Tube feed stopped at this time.  
Dr. Jones at bedside.  gave verbal order to RN for pt home dose of Vistaril. Instructed RN to not give if pt mentation worsens but okay for now. RN aware.  
Goal rate for tube feeding changed to 50 per family request.  
Message left with Dr. Garcias regarding need to intermediate status   
Occupational Therapy     Date:11/3/2024  Patient Name: Lissa Diggs  MRN: [id number]  : 1965  Room: [address] [place] [address]-A     Completed chart review & attempted to see pt with pt lethargic/family present /Hold therapy at this time/ Will attempt to see pt at another time.    Tin MARX [id number]        
Palliative care consult called to [facility] per perfect serv patient added to census.   
Physical Therapy    Pt on PT caseload for Sunday treatment. Met with pt and family in room. Pt lethargic and unable to open eyes for participation in PT treatment this date. Will reattempt as able.     Enid Levi PT, DPT  [id number]     
Pt had large diarrhea and Rns performed incontinence/pericare and replaced bed and padding. Pt turned to R side using wedges. Warm blanket applied.  
RN does not see order for wound care nurse and pt family states they were followed by them last admission and is requesting that again. Dr. Jones messaged to ask if RN can place wound care RN consult.  
RN placed wound care eval and treat per verbal order from Dr. Jones. Perfect serve sent to inpatient wound care.  
Received a message from the patient's nurse that the patient has not had an improvement in pain or agitation with addition of morphine. Medications adjusted.   
Report given to David on 7WE  
SURGERY NOTE    S/P EGD and PEG placement  PEG 2cm at skin  Bumper rotates easily  Abdomen soft, non-distended, non-tender  OK for medications and TF via PEG  Abdominal binder for 4 weeks  No further surgical intervention indicated at this time  Follow-up in the office as needed       Echo Carcamo MD  Resident, PGY-4  11/1/2024  7:18 AM    
Spiritual Health History and Assessment/Progress Note  [facility]    Initial Encounter, Spiritual/Emotional Needs,  ,  ,      Name: Lissa Diggs MRN: [id number]    Age: 59 y.o.     Sex: female   Language: English   Gnosticism: Congregational   AMS (altered mental status)     Date: 11/1/2024                           Spiritual Assessment began in [facility]        Referral/Consult From: Rounding   Encounter Overview/Reason: Initial Encounter, Spiritual/Emotional Needs  Service Provided For: Patient and family together, Significant other    Isha, Belief, Meaning:   Patient identifies as spiritual, is connected with a isha tradition or spiritual practice, and has beliefs or practices that help with coping during difficult times  Family/Friends identify as spiritual, are connected with a isha tradition or spiritual practice, and have beliefs or practices that help with coping during difficult times      Importance and Influence:  Patient has spiritual/personal beliefs that influence decisions regarding their health  Family/Friends have spiritual/personal beliefs that influence decisions regarding the patient's health    Community:  Patient is connected with a spiritual community and feels well-supported. Support system includes: Spouse/Partner, Children, and Extended family  Family/Friends are connected with a spiritual community:    Assessment and Plan of Care:     Patient Interventions include: Provided sacramental/Sikh ritual  Family/Friends Interventions include: Provided sacramental/Sikh ritual    Patient Plan of Care: Spiritual Care available upon further referral  Family/Friends Plan of Care: Spiritual Care available upon further referral    Electronically signed by Chaplain Lew on 11/1/2024 at 3:40 PM   
at the bedside.  The patient was resting comfortably.  Her  explains that she did have some pain and agitation throughout the night.  He explains that they have had to increase the usage of the morphine.  He confirms that goal is comfort and he understands that these medications are affecting her mentation.  Plan is to proceed with hospice at the facility.  Support provided.  We will continue to follow.    Prognosis: Guarded    OBJECTIVE:     BP (!) 104/56   Pulse (!) 107   Temp 97.2 °F (36.2 °C) (Temporal)   Resp 14   Ht 1.626 m (5' 4\")   Wt 116.3 kg (256 lb 4.8 oz)   SpO2 93%   BMI 43.99 kg/m²     Physical Examination:  Gen: Ill-appearing, awake, confused  HEENT: normocephalic, atraumatic  Neck: trachea midline, no JVD  Lungs: respirations easy and not labored,   Heart: regular rate and rhythm, distant heart tones,   Abdomen: normoactive bowel sounds, soft, non-tender  Extremities: no clubbing, cyanosis or edema, moving all extremities    Skin: warm, dry without rashes, lesions, bruising  Neuro: Confusion, follows commands    Objective data reviewed: labs, images, records, medication use, vitals, and chart    Time/Communication  Greater than 50% of time spent, total 25 minutes in counseling and coordination of care at the bedside regarding goals of care.    Thank you for allowing Palliative Medicine to participate in the care of Lissa Diggs.    Note: This report was completed using computerize voiced recognition software.  Every effort has been made to ensure accuracy; however, inadvertent computerized transcription errors may be present.   
bedside.  He explained that the 2 mg of morphine did not seem to be effective last night, but after she received the 4 mg of morphine last evening she slept through the night.  He states that she was awake for a while this morning, before another dose was given.  She is resting comfortably.  He states that he has spoken with traditions and that they will follow want she returns to the facility.  He states that they believe they are waiting on a pre-CERT.  He denied any needs or concerns at this time.  We will continue to follow.    Prognosis: Guarded    OBJECTIVE:     BP (!) 100/53   Pulse 96   Temp 96.9 °F (36.1 °C) (Temporal)   Resp 18   Ht 1.626 m (5' 4\")   Wt 116.3 kg (256 lb 4.8 oz)   SpO2 98%   BMI 43.99 kg/m²     Physical Examination:  Gen: Ill-appearing, awake, confused  HEENT: normocephalic, atraumatic  Neck: trachea midline, no JVD  Lungs: respirations easy and not labored,   Heart: regular rate and rhythm, distant heart tones,   Abdomen: normoactive bowel sounds, soft, non-tender  Extremities: no clubbing, cyanosis or edema, moving all extremities    Skin: warm, dry without rashes, lesions, bruising  Neuro: Confusion, follows commands    Objective data reviewed: labs, images, records, medication use, vitals, and chart    Time/Communication  Greater than 50% of time spent, total 25 minutes in counseling and coordination of care at the bedside regarding goals of care.    Thank you for allowing Palliative Medicine to participate in the care of Lissa Diggs.    Note: This report was completed using computerBand Digital voiced recognition software.  Every effort has been made to ensure accuracy; however, inadvertent computerized transcription errors may be present.   
theological reflection, and Affirmed coping skills/support systems    Patient Plan of Care: Other: unable to assess at this time  Family/Friends Plan of Care: Spiritual Care available upon further referral    Electronically signed by CHAIM Douglas on 11/4/2024 at 1:04 PM    
*      Plan:    Acute metabolic encephalopathy  -Likely multifactorial  -Ammonia 73    Lactic acidosis  -Likely secondary due to decreased oral intake  -Continue gentle IV hydration    Cholelithiasis, possible cholecystitis on CT abdomen pelvis  -Await general surgery recommendations    Multiple skin wounds  History of positive wound culture for gram-positive and gram-negative rods, Proteus and strep  -Continue wound care  -PICC line in place  -Completed meropenem    Failure to thrive  Hypoalbuminemia  -Dietitian consulted  -Keep n.p.o.  -Patient and family are agreeable for PEG tube  -Check CT abdomen and pelvis to evaluate for ascites  -Plan for PEG tube placement    Liver cirrhosis due to MOYA  -Stable  -Continue rifaximin and lactulose    Physical debility and ambulatory dysfunction  Obesity BMI 44.97  -Patient is bedbound for the last 5 months  -Severe right knee osteoarthritis    Chronic type 2 diabetes  -Continue sliding scale insulin with hypoglycemia protocol    Chronic seizure disorder  -Continue Keppra    Insomnia  -Continue melatonin    VTE prophylaxis: SCD      NOTE: This report was transcribed using voice recognition software. Every effort was made to ensure accuracy; however, inadvertent computerized transcription errors may be present.  Electronically signed by Lilo Jones MD on 10/30/2024 at 7:57 AM    
hiatal hernia again noted.  6. Large right lower anterior abdominal wall hernia again seen, which  contains fat and nonobstructed loops of small bowel.  7. Partially seen bilateral pleural effusions, which appear moderate in  amount. Associated compressive atelectasis is appreciated.  8. Other findings, as described above.    Assessment:    Principal Problem:    AMS (altered mental status)  Active Problems:    Severe protein-calorie malnutrition (HCC)  Resolved Problems:    * No resolved hospital problems. *      Plan:    Acute metabolic encephalopathy  -Likely multifactorial  -Ammonia 73    Failure to thrive  Hypoalbuminemia  Dysphagia  -S/p PEG tube placed on 10/31/24  -Continue tube feeding  -Continue oral dysphagia diet    Lactic acidosis  Acute kidney injury  hypernatremia  -Likely secondary due to decreased oral intake  -Continue gentle IV hydration  -started on tube feeds with free water flushes  -increase free water flushes.   -discussed with the , recheck sodium tomorrow to adjust free water flushes.     Cholelithiasis on CT abdomen pelvis  -No concern for acute cholecystitis as per surgery    Multiple skin wounds  History of positive wound culture for gram-positive and gram-negative rods, Proteus and strep  -Continue wound care  -PICC line in place  -Completed meropenem    Liver cirrhosis due to MOYA  -Stable  -Continue rifaximin and lactulose    Physical debility and ambulatory dysfunction  Obesity BMI 44.97  Pressure ulcers  -Patient is bedbound for the last 5 months  -Severe right knee osteoarthritis  -Follow wound care  -Palliative care is consulted    Chronic type 2 diabetes  -Continue sliding scale insulin with hypoglycemia protocol    Chronic seizure disorder  -Continue Keppra    Insomnia  -Continue melatonin    VTE prophylaxis: lovenox  Dispo: to nursing home and then enroll with hospice there. Precert pending, ready for discharge    NOTE: This report was transcribed using voice recognition 
pericolonic linear stranding about the descending colon. Although the  colon itself appears unremarkable, the presence of nonspecific colitis needs  to be excluded.  Clinical correlation is recommended.  5. Small hiatal hernia again noted.  6. Large right lower anterior abdominal wall hernia again seen, which  contains fat and nonobstructed loops of small bowel.  7. Partially seen bilateral pleural effusions, which appear moderate in  amount. Associated compressive atelectasis is appreciated.  8. Other findings, as described above.    Assessment:    Principal Problem:    AMS (altered mental status)  Active Problems:    Severe protein-calorie malnutrition (HCC)  Resolved Problems:    * No resolved hospital problems. *      Plan:    Acute metabolic encephalopathy  -Likely multifactorial  -Ammonia 73    Failure to thrive  Hypoalbuminemia  Dysphagia  -S/p PEG tube placed on 10/31/24  -Continue tube feeding  -Continue oral dysphagia diet    Lactic acidosis  Acute kidney injury  hypernatremia  -Likely secondary due to decreased oral intake  -Continue gentle IV hydration  -started on tube feeds with free water flushes  -increase free water flushes.   -discussed with the , recheck sodium tomorrow to adjust free water flushes.     Cholelithiasis on CT abdomen pelvis  -No concern for acute cholecystitis as per surgery    Multiple skin wounds  History of positive wound culture for gram-positive and gram-negative rods, Proteus and strep  -Continue wound care  -PICC line in place  -Completed meropenem    Liver cirrhosis due to MOYA  -Stable  -Continue rifaximin and lactulose    Physical debility and ambulatory dysfunction  Obesity BMI 44.97  Pressure ulcers  -Patient is bedbound for the last 5 months  -Severe right knee osteoarthritis  -Follow wound care  -Palliative care is consulted    Chronic type 2 diabetes  -Continue sliding scale insulin with hypoglycemia protocol    Chronic seizure disorder  -Continue 
right lower anterior abdominal wall hernia again seen, which  contains fat and nonobstructed loops of small bowel.  7. Partially seen bilateral pleural effusions, which appear moderate in  amount. Associated compressive atelectasis is appreciated.  8. Other findings, as described above.    Assessment:    Principal Problem:    AMS (altered mental status)  Active Problems:    Severe protein-calorie malnutrition (HCC)  Resolved Problems:    * No resolved hospital problems. *      Plan:    Acute metabolic encephalopathy  -Likely multifactorial  -Ammonia 73    Failure to thrive  Hypoalbuminemia  Dysphagia  -S/p PEG tube placed on 10/31/24  -Continue tube feeding  -Continue oral dysphagia diet    Lactic acidosis  -Likely secondary due to decreased oral intake  -Continue gentle IV hydration    Cholelithiasis on CT abdomen pelvis  -No concern for acute cholecystitis as per surgery    Multiple skin wounds  History of positive wound culture for gram-positive and gram-negative rods, Proteus and strep  -Continue wound care  -PICC line in place  -Completed meropenem    Liver cirrhosis due to MOYA  -Stable  -Continue rifaximin and lactulose    Physical debility and ambulatory dysfunction  Obesity BMI 44.97  Pressure ulcers  -Patient is bedbound for the last 5 months  -Severe right knee osteoarthritis  -Follow wound care  -Palliative care is consulted    Chronic type 2 diabetes  -Continue sliding scale insulin with hypoglycemia protocol    Chronic seizure disorder  -Continue Keppra    Insomnia  -Continue melatonin    VTE prophylaxis: SCD      NOTE: This report was transcribed using voice recognition software. Every effort was made to ensure accuracy; however, inadvertent computerized transcription errors may be present.  Electronically signed by Lilo Jones MD on 11/1/2024 at 9:14 AM    
observation of tasks, assessment of data and education on plan of care and goals.    CPT codes:  [x] Low Complexity PT evaluation 62744  [] Moderate Complexity PT evaluation 27294  [] High Complexity PT evaluation 15395  [] PT Re-evaluation 01242  [] Gait training 69256 0 minutes  [] Manual therapy 95058 0 minutes  [x] Therapeutic activities 44161 8 minutes  [] Therapeutic exercises 96147 0 minutes  [] Neuromuscular reeducation 91238 0 minutes     Monica Epp, PT, DPT  [id number]      
[]  None []  None []   Moderate Complexity   3 to 5 [x]  Mod [x]  Maybe [x]  Min to Mod []   High Complexity   5 or more []  High []  Yes []  Max [x]     The above evaluation is classified as moderate  complexity based off the noted performance deficits, personal factors, co-morbidities, assistance required, and other factors as noted in the clinical evaluation and functional testing.     Evaluation time includes thorough review of current medical information, gathering information on past medical & social history & PLOF, completion of standardized testing, informal observation of tasks, consultation with other medical professions/disciplines, assessment of data & development of POC/goals.     Time In: 1150  Time Out: 1205  Total Treatment Time: 0 eval only    Min Units   OT Eval Low 02358       OT Eval Medium 22564  X    OT Eval High 07761      OT Re-Eval 42353       Therapeutic Ex 81190       Therapeutic Activities 79464       ADL/Self Care 67867       Orthotic Management 34124       Manual 33123     Neuro Re-Ed 54283       Non-Billable Time                  Rosana Hernandes, OTD, OTR/L; [id number]

## 2024-11-04 NOTE — PLAN OF CARE
Problem: Skin/Tissue Integrity  Goal: Absence of new skin breakdown  Description: 1.  Monitor for areas of redness and/or skin breakdown  2.  Assess vascular access sites hourly  3.  Every 4-6 hours minimum:  Change oxygen saturation probe site  4.  Every 4-6 hours:  If on nasal continuous positive airway pressure, respiratory therapy assess nares and determine need for appliance change or resting period.  11/4/2024 1032 by Pat Moran RN  Outcome: Progressing     Problem: Chronic Conditions and Co-morbidities  Goal: Patient's chronic conditions and co-morbidity symptoms are monitored and maintained or improved  11/4/2024 1032 by Pat Moran RN  Outcome: Progressing     Problem: Discharge Planning  Goal: Discharge to home or other facility with appropriate resources  11/4/2024 1032 by Pat Moran RN  Outcome: Progressing     Problem: Safety - Adult  Goal: Free from fall injury  11/4/2024 1032 by Pat Moran RN  Outcome: Progressing     Problem: Pain  Goal: Verbalizes/displays adequate comfort level or baseline comfort level  11/4/2024 1032 by Pat Moran RN  Outcome: Progressing     Problem: Nutrition Deficit:  Goal: Optimize nutritional status  11/4/2024 1032 by Pat Moran RN  Outcome: Progressing     Problem: ABCDS Injury Assessment  Goal: Absence of physical injury  11/4/2024 1032 by Pat Moran RN  Outcome: Progressing

## 2024-11-04 NOTE — CARE COORDINATION
11/4/24 Update CM Note. Pt admitted 11/30/24 for AMS.  Pt from [facility]. Discharge plan was to return to facility and have hospice consult after return. Met with  this am, now planning Hospice consult and would like [facility] if eligible.  Referral to [facility]  Radha SUAREZN RN-BC  [phone] Addendum: [facility] will be [facility] to [facility].  Awaiting discharge time to be set up per liaison.

## (undated) DEVICE — BINDER ABD UNISX 4 PNL PREM 6INX6INX12IN L XL 4

## (undated) DEVICE — Device

## (undated) DEVICE — GAUZE,SPONGE,4"X4",8PLY,STRL,LF,10/TRAY: Brand: MEDLINE

## (undated) DEVICE — BITEBLOCK 54FR W/ DENT RIM BLOX

## (undated) DEVICE — DEFENDO AIR WATER SUCTION AND BIOPSY VALVE KIT FOR  OLYMPUS: Brand: DEFENDO AIR/WATER/SUCTION AND BIOPSY VALVE